# Patient Record
Sex: MALE | Race: WHITE | URBAN - METROPOLITAN AREA
[De-identification: names, ages, dates, MRNs, and addresses within clinical notes are randomized per-mention and may not be internally consistent; named-entity substitution may affect disease eponyms.]

---

## 2023-01-16 SDOH — HEALTH STABILITY: PHYSICAL HEALTH: ON AVERAGE, HOW MANY MINUTES DO YOU ENGAGE IN EXERCISE AT THIS LEVEL?: 0 MIN

## 2023-01-17 ENCOUNTER — OFFICE VISIT (OUTPATIENT)
Dept: FAMILY MEDICINE CLINIC | Age: 68
End: 2023-01-17
Payer: COMMERCIAL

## 2023-01-17 VITALS
HEART RATE: 89 BPM | DIASTOLIC BLOOD PRESSURE: 70 MMHG | RESPIRATION RATE: 16 BRPM | OXYGEN SATURATION: 97 % | WEIGHT: 288 LBS | HEIGHT: 69 IN | BODY MASS INDEX: 42.65 KG/M2 | SYSTOLIC BLOOD PRESSURE: 122 MMHG | TEMPERATURE: 98.4 F

## 2023-01-17 DIAGNOSIS — E66.01 OBESITY, CLASS III, BMI 40-49.9 (MORBID OBESITY) (HCC): ICD-10-CM

## 2023-01-17 DIAGNOSIS — I10 PRIMARY HYPERTENSION: ICD-10-CM

## 2023-01-17 DIAGNOSIS — H93.8X2 EAR FULLNESS, LEFT: ICD-10-CM

## 2023-01-17 DIAGNOSIS — E78.5 HYPERLIPIDEMIA, UNSPECIFIED HYPERLIPIDEMIA TYPE: ICD-10-CM

## 2023-01-17 DIAGNOSIS — K21.9 GASTROESOPHAGEAL REFLUX DISEASE, UNSPECIFIED WHETHER ESOPHAGITIS PRESENT: Primary | ICD-10-CM

## 2023-01-17 DIAGNOSIS — J32.9 CHRONIC SINUSITIS, UNSPECIFIED LOCATION: ICD-10-CM

## 2023-01-17 PROBLEM — E66.813 OBESITY, CLASS III, BMI 40-49.9 (MORBID OBESITY): Status: ACTIVE | Noted: 2023-01-17

## 2023-01-17 PROCEDURE — 3078F DIAST BP <80 MM HG: CPT | Performed by: FAMILY MEDICINE

## 2023-01-17 PROCEDURE — 1123F ACP DISCUSS/DSCN MKR DOCD: CPT | Performed by: FAMILY MEDICINE

## 2023-01-17 PROCEDURE — 99204 OFFICE O/P NEW MOD 45 MIN: CPT | Performed by: FAMILY MEDICINE

## 2023-01-17 PROCEDURE — 3074F SYST BP LT 130 MM HG: CPT | Performed by: FAMILY MEDICINE

## 2023-01-17 RX ORDER — TRIAMTERENE AND HYDROCHLOROTHIAZIDE 37.5; 25 MG/1; MG/1
1 TABLET ORAL DAILY
COMMUNITY

## 2023-01-17 RX ORDER — FAMOTIDINE 40 MG/1
40 TABLET, FILM COATED ORAL 2 TIMES DAILY
Qty: 60 TABLET | Refills: 5 | Status: SHIPPED | OUTPATIENT
Start: 2023-01-17

## 2023-01-17 RX ORDER — AMOXICILLIN AND CLAVULANATE POTASSIUM 875; 125 MG/1; MG/1
1 TABLET, FILM COATED ORAL 2 TIMES DAILY
Qty: 20 TABLET | Refills: 0 | Status: SHIPPED | OUTPATIENT
Start: 2023-01-17 | End: 2023-01-27

## 2023-01-17 RX ORDER — FAMOTIDINE 40 MG/1
40 TABLET, FILM COATED ORAL DAILY
COMMUNITY
End: 2023-01-17 | Stop reason: SDUPTHER

## 2023-01-17 RX ORDER — FAMOTIDINE 40 MG/1
40 TABLET, FILM COATED ORAL 2 TIMES DAILY
Qty: 60 TABLET | Refills: 2 | Status: SHIPPED | OUTPATIENT
Start: 2023-01-17 | End: 2023-01-17 | Stop reason: SDUPTHER

## 2023-01-17 RX ORDER — ATORVASTATIN CALCIUM 20 MG/1
20 TABLET, FILM COATED ORAL DAILY
Qty: 90 TABLET | Refills: 1 | Status: SHIPPED | OUTPATIENT
Start: 2023-01-17

## 2023-01-17 RX ORDER — LOSARTAN POTASSIUM 50 MG/1
50 TABLET ORAL DAILY
COMMUNITY
End: 2023-01-17 | Stop reason: SDUPTHER

## 2023-01-17 RX ORDER — LOSARTAN POTASSIUM 50 MG/1
50 TABLET ORAL DAILY
Qty: 90 TABLET | Refills: 1 | Status: SHIPPED | OUTPATIENT
Start: 2023-01-17

## 2023-01-17 RX ORDER — AMLODIPINE BESYLATE 10 MG/1
10 TABLET ORAL DAILY
Qty: 90 TABLET | Refills: 1 | Status: SHIPPED | OUTPATIENT
Start: 2023-01-17

## 2023-01-17 RX ORDER — AMLODIPINE BESYLATE 10 MG/1
10 TABLET ORAL DAILY
COMMUNITY
End: 2023-01-17 | Stop reason: SDUPTHER

## 2023-01-17 RX ORDER — SIMVASTATIN 40 MG
40 TABLET ORAL NIGHTLY
COMMUNITY
End: 2023-01-17 | Stop reason: ALTCHOICE

## 2023-01-17 ASSESSMENT — ENCOUNTER SYMPTOMS
SINUS PRESSURE: 1
SINUS COMPLAINT: 1

## 2023-01-17 ASSESSMENT — PATIENT HEALTH QUESTIONNAIRE - PHQ9
SUM OF ALL RESPONSES TO PHQ QUESTIONS 1-9: 0
SUM OF ALL RESPONSES TO PHQ9 QUESTIONS 1 & 2: 0
1. LITTLE INTEREST OR PLEASURE IN DOING THINGS: 0
SUM OF ALL RESPONSES TO PHQ QUESTIONS 1-9: 0
SUM OF ALL RESPONSES TO PHQ QUESTIONS 1-9: 0
2. FEELING DOWN, DEPRESSED OR HOPELESS: 0
SUM OF ALL RESPONSES TO PHQ QUESTIONS 1-9: 0

## 2023-01-17 NOTE — PROGRESS NOTES
Patient ID: Melyl [place] [birth year]    . Chief Complaint   Patient presents with    New Patient    Hypertension    Hyperlipidemia    Knee Pain     Right.1/10. Pain increases when sitting with legs out, legs gets stiff then pain increases. Had Knee replacement 16 months ago. Ear Fullness     Left ear fullness, has had tubes in left ear. Hypertension  This is a chronic problem. The problem is controlled. Risk factors for coronary artery disease include obesity and male gender. Past treatments include angiotensin blockers and calcium channel blockers. Compliance problems include diet. Hyperlipidemia  This is a chronic problem. Current antihyperlipidemic treatment includes statins. Risk factors for coronary artery disease include male sex, obesity and hypertension. Foot Swelling   The pain is present in the left lower leg and right lower leg. This is a recurrent problem. The current episode started more than 1 month ago. There has been no history of extremity trauma. Treatments tried: diuretic every great once in a while. Knee Pain   Incident onset: Had knee replacement here in [facility]. Plans on going to [place] to get his knee reevaluated. The pain is present in the right knee. The pain is at a severity of 1/10. Associated symptoms comments: swelling. Sinus Problem  This is a recurrent problem. Episode onset: 1 month. Associated symptoms include sinus pressure. Ear Fullness   There is pain in the left ear. This is a new problem. The current episode started 1 to 4 weeks ago. The problem occurs constantly. The problem has been unchanged. Gastroesophageal Reflux  This is a chronic problem. The current episode started more than 1 year ago. The problem occurs occasionally. The problem has been waxing and waning. The symptoms are aggravated by certain foods. He has tried a histamine-2 antagonist for the symptoms. The treatment provided mild relief.        Patient Active Problem List Diagnosis    Gastroesophageal reflux disease    Obesity, Class III, BMI 40-49.9 (morbid obesity) ([facility] [address]    Hyperlipidemia    Primary hypertension         Past Surgical History:   Procedure Laterality Date    SINUS SURGERY      TOTAL KNEE ARTHROPLASTY Right     TYMPANOSTOMY TUBE PLACEMENT Left     UMBILICAL HERNIA REPAIR      VASECTOMY         Family History   Problem Relation Age of Onset    Breast Cancer Mother          62    Heart Failure Father        Current Outpatient Medications on File Prior to Visit   Medication Sig Dispense Refill    triamterene-hydroCHLOROthiazide (MAXZIDE-25) 37.5-25 MG per tablet Take 1 tablet by mouth daily       No current facility-administered medications on file prior to visit. Objective:         Physical Exam  Constitutional:       General: He is not in acute distress. Appearance: He is well-developed. He is morbidly obese. HENT:      Head: Normocephalic and atraumatic. Comments: Large tongue small airway. Right Ear: Hearing, tympanic membrane and external ear normal.      Left Ear: Hearing, tympanic membrane and external ear normal.      Nose: No mucosal edema or rhinorrhea. Right Sinus: No maxillary sinus tenderness or frontal sinus tenderness. Left Sinus: No maxillary sinus tenderness or frontal sinus tenderness. Mouth/Throat:      Pharynx: No oropharyngeal exudate or posterior oropharyngeal erythema. Tonsils: No tonsillar abscesses. Eyes:      General: Lids are normal.      Conjunctiva/sclera: Conjunctivae normal.   Neck:      Thyroid: No thyromegaly. Trachea: No tracheal deviation. Cardiovascular:      Rate and Rhythm: Normal rate and regular rhythm. Heart sounds: Normal heart sounds, S1 normal and S2 normal. No murmur heard. No gallop. Pulmonary:      Effort: Pulmonary effort is normal. No respiratory distress. Breath sounds: Normal breath sounds. No wheezing or rales.    Abdominal: Palpations: Abdomen is soft. There is no mass. Tenderness: There is no abdominal tenderness. Musculoskeletal:      Right lower leg: No edema. Left lower leg: No edema. Lymphadenopathy:      Head:      Right side of head: No submental, submandibular or posterior auricular adenopathy. Left side of head: No submental, submandibular or posterior auricular adenopathy. Cervical:      Right cervical: No superficial, deep or posterior cervical adenopathy. Left cervical: No superficial, deep or posterior cervical adenopathy. Skin:     General: Skin is warm and dry. Neurological:      Mental Status: He is oriented to person, place, and time. Psychiatric:         Speech: Speech normal.         Behavior: Behavior normal.         Thought Content: Thought content normal.     Vitals:    01/17/23 1047   BP: 122/70   Site: Left Upper Arm   Position: Sitting   Cuff Size: Large Adult   Pulse: 89   Resp: 16   Temp: 98.4 °F (36.9 °C)   TempSrc: Infrared   SpO2: 97%   Weight: 288 lb (130.6 kg)   Height: 5' 9.29\" (1.76 m)     Body mass index is 42.17 kg/m². Wt Readings from Last 3 Encounters:   01/17/23 288 lb (130.6 kg)   05/10/17 260 lb (117.9 kg)   05/31/16 260 lb (117.9 kg)     BP Readings from Last 3 Encounters:   01/17/23 122/70   05/10/17 138/86   05/31/16 148/83          No results found for this visit on 01/17/23. The ASCVD Risk score (Jamal DK, et al., 2019) failed to calculate for the following reasons:    Cannot find a previous HDL lab    Cannot find a previous total cholesterol lab  Lab Review not applicable        Assessment:       Diagnosis Orders   1. Gastroesophageal reflux disease, unspecified whether esophagitis present  famotidine (PEPCID) 40 MG tablet    DISCONTINUED: famotidine (PEPCID) 40 MG tablet      2. Obesity, Class III, BMI 40-49.9 (morbid obesity) ([facility] [address]        3. Hyperlipidemia, unspecified hyperlipidemia type  atorvastatin (LIPITOR) 20 MG tablet      4.  Primary hypertension  losartan (COZAAR) 50 MG tablet    amLODIPine (NORVASC) 10 MG tablet      5. Chronic sinusitis, unspecified location  amoxicillin-clavulanate (AUGMENTIN) 875-125 MG per tablet      6. Ear fullness, left                Plan:      Since GERD is not stable, will increase the Pepcid from once a day to twice a day    Encourage patient to lose weight    Regarding his hyperlipidemia, change the Zocor to Lipitor. Patient on amlodipine which is a contraindication to the mix of Zocor and amlodipine    Hypertension stable. Continue losartan and amlodipine. Weight loss strongly recommended    Reassurance that left ear is okay to    Antibiotics for the sinus infection. Recheck in 6 months    Get records. Return in about 6 months (around 7/6/2023) for HTN, Hyperlipid, Gerd.

## 2023-01-17 NOTE — PATIENT INSTRUCTIONS
Learning About Eating More Fruits and Vegetables  What are some quick tips for eating more fruits and vegetables? We're all encouraged to eat more fruits and vegetables. Yet it can seem like one more chore on the daily to-do list. But you can add color and crunch to your meals--and lots of nutrition--with these quick tips. Brighten up your breakfast.  Add sliced fruit or frozen berries to your yogurt, pancakes, or cereal.  Blend fresh or frozen fruit, veggies, and yogurt with a little fruit juice, and you've got a tasty smoothie. Make your scrambled eggs a gourmet treat by adding onions, celery, and bell peppers. Bake up some bran muffins with grated carrots added into the mix. Make a livelier lunch. Jazz up tuna or chicken salad with apple chunks, celery, or grapes--or all of them! Add cucumbers, avocado slices, tomatoes, and lettuce to your sandwiches. Kick up the flavor of grilled cheese sandwiches with spinach and tomatoes. Puree some potatoes or squash to add to tomato soup. Add delicious veggies to dinner. Give more color and taste to salads. Stir in red cabbage, carrots, and bell peppers. Top salads with dried cranberries or raisins. \"Frost\" your salad with orange sections or strawberries. Keep a bag or two of frozen vegetables ready to pull out of the freezer for a side dish. Spice up spaghetti and meatballs with mushrooms and bell peppers. Roast vegetables like cauliflower or squash in the oven with olive oil to bring out their flavor. Season your veggie dish with herbs like basil and wally and a splash of lemon juice and olive oil. If you've got a main dish in the oven, stick in a potato to round out your meal.  Grab some healthy snacks on the go. Scoop up an apple, banana, or plum for a quick snack. Cut up raw fruits and veggies to keep in your fridge. Grapes, oranges, carrots, and celery are great choices. They'll be ready for a quick snack or an after-school treat.   Dip raw vegetables in hummus or peanut butter. Keep dried fruit on hand for an easy \"take with you\" snack. Make something sweet--and healthy. Try baked apples or pears topped with cinnamon and honey for a delicious dessert. Make chocolate chip cookies even better with grated carrots added to the mix. Where can you learn more? Go to https://[employer]. org and sign in to your [employer] account. Enter F050 in the [place] box to learn more about \"Learning About Eating More Fruits and Vegetables. \"     If you do not have an account, please click on the \"Sign Up Now\" link. Current as of: November 7, 2018  Content Version: 12.0  © 3675-7991 Healthwise, Incorporated. Care instructions adapted under license by [facility] [facility] If you have questions about a medical condition or this instruction, always ask your healthcare professional. [place] [address] any warranty or liability for your use of this information.

## 2023-01-18 ENCOUNTER — TELEPHONE (OUTPATIENT)
Dept: FAMILY MEDICINE CLINIC | Age: 68
End: 2023-01-18

## 2023-01-18 NOTE — TELEPHONE ENCOUNTER
Called patient to schedule AWV with LPN. LM for him to return my call. Need to also verify Medicare B effective date.

## 2023-07-03 SDOH — ECONOMIC STABILITY: INCOME INSECURITY: HOW HARD IS IT FOR YOU TO PAY FOR THE VERY BASICS LIKE FOOD, HOUSING, MEDICAL CARE, AND HEATING?: NOT HARD AT ALL

## 2023-07-03 SDOH — ECONOMIC STABILITY: TRANSPORTATION INSECURITY
IN THE PAST 12 MONTHS, HAS LACK OF TRANSPORTATION KEPT YOU FROM MEETINGS, WORK, OR FROM GETTING THINGS NEEDED FOR DAILY LIVING?: NO

## 2023-07-03 SDOH — ECONOMIC STABILITY: FOOD INSECURITY: WITHIN THE PAST 12 MONTHS, YOU WORRIED THAT YOUR FOOD WOULD RUN OUT BEFORE YOU GOT MONEY TO BUY MORE.: NEVER TRUE

## 2023-07-03 SDOH — ECONOMIC STABILITY: FOOD INSECURITY: WITHIN THE PAST 12 MONTHS, THE FOOD YOU BOUGHT JUST DIDN'T LAST AND YOU DIDN'T HAVE MONEY TO GET MORE.: NEVER TRUE

## 2023-07-03 SDOH — ECONOMIC STABILITY: HOUSING INSECURITY
IN THE LAST 12 MONTHS, WAS THERE A TIME WHEN YOU DID NOT HAVE A STEADY PLACE TO SLEEP OR SLEPT IN A SHELTER (INCLUDING NOW)?: NO

## 2023-07-06 ENCOUNTER — OFFICE VISIT (OUTPATIENT)
Dept: FAMILY MEDICINE CLINIC | Age: 68
End: 2023-07-06
Payer: COMMERCIAL

## 2023-07-06 VITALS
HEART RATE: 102 BPM | WEIGHT: 292 LBS | SYSTOLIC BLOOD PRESSURE: 138 MMHG | RESPIRATION RATE: 16 BRPM | DIASTOLIC BLOOD PRESSURE: 70 MMHG | OXYGEN SATURATION: 96 % | BODY MASS INDEX: 42.76 KG/M2

## 2023-07-06 DIAGNOSIS — Z86.010 HISTORY OF COLON POLYPS: ICD-10-CM

## 2023-07-06 DIAGNOSIS — F51.04 PSYCHOPHYSIOLOGIC INSOMNIA: ICD-10-CM

## 2023-07-06 DIAGNOSIS — I49.3 FREQUENT PVCS: ICD-10-CM

## 2023-07-06 DIAGNOSIS — Z12.11 SCREEN FOR COLON CANCER: ICD-10-CM

## 2023-07-06 DIAGNOSIS — I10 PRIMARY HYPERTENSION: Primary | ICD-10-CM

## 2023-07-06 DIAGNOSIS — E66.01 OBESITY, CLASS III, BMI 40-49.9 (MORBID OBESITY) (HCC): ICD-10-CM

## 2023-07-06 DIAGNOSIS — K21.9 GASTROESOPHAGEAL REFLUX DISEASE, UNSPECIFIED WHETHER ESOPHAGITIS PRESENT: ICD-10-CM

## 2023-07-06 DIAGNOSIS — E78.5 HYPERLIPIDEMIA, UNSPECIFIED HYPERLIPIDEMIA TYPE: ICD-10-CM

## 2023-07-06 LAB
ALBUMIN SERPL-MCNC: 4.4 G/DL (ref 3.4–5)
ALBUMIN/GLOB SERPL: 1.8 {RATIO} (ref 1.1–2.2)
ALP SERPL-CCNC: 104 U/L (ref 40–129)
ALT SERPL-CCNC: 18 U/L (ref 10–40)
ANION GAP SERPL CALCULATED.3IONS-SCNC: 14 MMOL/L (ref 3–16)
AST SERPL-CCNC: 19 U/L (ref 15–37)
BASOPHILS # BLD: 0 K/UL (ref 0–0.2)
BASOPHILS NFR BLD: 0.5 %
BILIRUB SERPL-MCNC: <0.2 MG/DL (ref 0–1)
BUN SERPL-MCNC: 19 MG/DL (ref 7–20)
CALCIUM SERPL-MCNC: 9.8 MG/DL (ref 8.3–10.6)
CHLORIDE SERPL-SCNC: 105 MMOL/L (ref 99–110)
CHOLEST SERPL-MCNC: 181 MG/DL (ref 0–199)
CO2 SERPL-SCNC: 24 MMOL/L (ref 21–32)
CREAT SERPL-MCNC: 1.2 MG/DL (ref 0.8–1.3)
CREAT UR-MCNC: 208 MG/DL (ref 39–259)
DEPRECATED RDW RBC AUTO: 14.3 % (ref 12.4–15.4)
EOSINOPHIL # BLD: 0.2 K/UL (ref 0–0.6)
EOSINOPHIL NFR BLD: 3.2 %
GFR SERPLBLD CREATININE-BSD FMLA CKD-EPI: >60 ML/MIN/{1.73_M2}
GLUCOSE SERPL-MCNC: 122 MG/DL (ref 70–99)
HCT VFR BLD AUTO: 44.8 % (ref 40.5–52.5)
HDLC SERPL-MCNC: 39 MG/DL (ref 40–60)
HGB BLD-MCNC: 15.2 G/DL (ref 13.5–17.5)
LDLC SERPL CALC-MCNC: 92 MG/DL
LYMPHOCYTES # BLD: 1.7 K/UL (ref 1–5.1)
LYMPHOCYTES NFR BLD: 25.6 %
MCH RBC QN AUTO: 27.9 PG (ref 26–34)
MCHC RBC AUTO-ENTMCNC: 33.9 G/DL (ref 31–36)
MCV RBC AUTO: 82.4 FL (ref 80–100)
MICROALBUMIN UR DL<=1MG/L-MCNC: <1.2 MG/DL
MICROALBUMIN/CREAT UR: NORMAL MG/G (ref 0–30)
MONOCYTES # BLD: 0.5 K/UL (ref 0–1.3)
MONOCYTES NFR BLD: 7.5 %
NEUTROPHILS # BLD: 4.2 K/UL (ref 1.7–7.7)
NEUTROPHILS NFR BLD: 63.2 %
PLATELET # BLD AUTO: 281 K/UL (ref 135–450)
PMV BLD AUTO: 9.2 FL (ref 5–10.5)
POTASSIUM SERPL-SCNC: 3.9 MMOL/L (ref 3.5–5.1)
PROT SERPL-MCNC: 6.8 G/DL (ref 6.4–8.2)
RBC # BLD AUTO: 5.44 M/UL (ref 4.2–5.9)
SODIUM SERPL-SCNC: 143 MMOL/L (ref 136–145)
TRIGL SERPL-MCNC: 250 MG/DL (ref 0–150)
TSH SERPL DL<=0.005 MIU/L-ACNC: 2.33 UIU/ML (ref 0.27–4.2)
VLDLC SERPL CALC-MCNC: 50 MG/DL
WBC # BLD AUTO: 6.6 K/UL (ref 4–11)

## 2023-07-06 PROCEDURE — 36415 COLL VENOUS BLD VENIPUNCTURE: CPT | Performed by: FAMILY MEDICINE

## 2023-07-06 PROCEDURE — 93000 ELECTROCARDIOGRAM COMPLETE: CPT | Performed by: FAMILY MEDICINE

## 2023-07-06 PROCEDURE — 99214 OFFICE O/P EST MOD 30 MIN: CPT | Performed by: FAMILY MEDICINE

## 2023-07-06 PROCEDURE — 1123F ACP DISCUSS/DSCN MKR DOCD: CPT | Performed by: FAMILY MEDICINE

## 2023-07-06 PROCEDURE — 3078F DIAST BP <80 MM HG: CPT | Performed by: FAMILY MEDICINE

## 2023-07-06 PROCEDURE — 3075F SYST BP GE 130 - 139MM HG: CPT | Performed by: FAMILY MEDICINE

## 2023-07-06 RX ORDER — LOSARTAN POTASSIUM 50 MG/1
50 TABLET ORAL DAILY
Qty: 90 TABLET | Refills: 1 | Status: SHIPPED
Start: 2023-07-06 | End: 2023-07-06 | Stop reason: ALTCHOICE

## 2023-07-06 RX ORDER — AMLODIPINE BESYLATE 10 MG/1
10 TABLET ORAL DAILY
Qty: 90 TABLET | Refills: 1 | Status: SHIPPED | OUTPATIENT
Start: 2023-07-06

## 2023-07-06 RX ORDER — ATORVASTATIN CALCIUM 20 MG/1
20 TABLET, FILM COATED ORAL DAILY
Qty: 90 TABLET | Refills: 1 | Status: SHIPPED | OUTPATIENT
Start: 2023-07-06

## 2023-07-06 RX ORDER — FAMOTIDINE 40 MG/1
40 TABLET, FILM COATED ORAL 2 TIMES DAILY
Qty: 60 TABLET | Refills: 5 | Status: SHIPPED | OUTPATIENT
Start: 2023-07-06

## 2023-07-06 RX ORDER — METOPROLOL SUCCINATE 50 MG/1
50 TABLET, EXTENDED RELEASE ORAL DAILY
Qty: 90 TABLET | Refills: 0 | Status: SHIPPED | OUTPATIENT
Start: 2023-07-06

## 2023-07-06 NOTE — PROGRESS NOTES
Patient ID: Phyllis Peterson     . Chief Complaint   Patient presents with    Hypertension    Hyperlipidemia    Gastroesophageal Reflux       Hypertension  This is a chronic problem. The problem is controlled. Risk factors for coronary artery disease include obesity and male gender. Past treatments include angiotensin blockers and calcium channel blockers. Compliance problems include diet. Hyperlipidemia  This is a chronic problem. Current antihyperlipidemic treatment includes statins. Risk factors for coronary artery disease include male sex, obesity and hypertension. Gastroesophageal Reflux  This is a chronic problem. The current episode started more than 1 year ago. The problem occurs occasionally. The problem has been waxing and waning. The symptoms are aggravated by certain foods. He has tried a histamine-2 antagonist for the symptoms. The treatment provided mild relief. Insomnia: Sometimes takes Tylenol PM to go to sleep. Patient Active Problem List   Diagnosis    Gastroesophageal reflux disease    Obesity, Class III, BMI 40-49.9 (morbid obesity) ([address])    Hyperlipidemia    Primary hypertension       Past Surgical History:   Procedure Laterality Date    SINUS SURGERY      TOTAL KNEE ARTHROPLASTY Right     TYMPANOSTOMY TUBE PLACEMENT Left     UMBILICAL HERNIA REPAIR      VASECTOMY         Family History   Problem Relation Age of Onset    Breast Cancer Mother          62    Heart Failure Father        No current outpatient medications on file prior to visit. No current facility-administered medications on file prior to visit. Objective:         Physical Exam  Vitals and nursing note reviewed. Constitutional:       Appearance: He is well-developed. He is morbidly obese. HENT:      Head: Normocephalic and atraumatic. Cardiovascular:      Rate and Rhythm: Normal rate. Rhythm irregularly irregular.       Heart sounds: Normal heart sounds, S1 normal and S2 normal. No

## 2023-07-07 ENCOUNTER — TELEPHONE (OUTPATIENT)
Dept: CARDIOLOGY CLINIC | Age: 68
End: 2023-07-07

## 2023-07-07 NOTE — TELEPHONE ENCOUNTER
Attempting to reach patient in order to schedule a consult with Denzel Hopper for hypertension and frequent PVC's per referral from Ladora Ganser; subscriber not accepting calls.

## 2023-07-12 ENCOUNTER — TELEMEDICINE (OUTPATIENT)
Dept: FAMILY MEDICINE CLINIC | Age: 68
End: 2023-07-12
Payer: COMMERCIAL

## 2023-07-12 DIAGNOSIS — E78.5 HYPERLIPIDEMIA, UNSPECIFIED HYPERLIPIDEMIA TYPE: Primary | ICD-10-CM

## 2023-07-12 DIAGNOSIS — Z00.00 INITIAL MEDICARE ANNUAL WELLNESS VISIT: Primary | ICD-10-CM

## 2023-07-12 PROCEDURE — 1123F ACP DISCUSS/DSCN MKR DOCD: CPT | Performed by: FAMILY MEDICINE

## 2023-07-12 PROCEDURE — G0438 PPPS, INITIAL VISIT: HCPCS | Performed by: FAMILY MEDICINE

## 2023-07-12 RX ORDER — ATORVASTATIN CALCIUM 40 MG/1
40 TABLET, FILM COATED ORAL DAILY
Qty: 90 TABLET | Refills: 1 | Status: SHIPPED | OUTPATIENT
Start: 2023-07-12

## 2023-07-12 ASSESSMENT — PATIENT HEALTH QUESTIONNAIRE - PHQ9
SUM OF ALL RESPONSES TO PHQ QUESTIONS 1-9: 0
1. LITTLE INTEREST OR PLEASURE IN DOING THINGS: 0
SUM OF ALL RESPONSES TO PHQ9 QUESTIONS 1 & 2: 0
SUM OF ALL RESPONSES TO PHQ QUESTIONS 1-9: 0
2. FEELING DOWN, DEPRESSED OR HOPELESS: 0

## 2023-07-12 ASSESSMENT — LIFESTYLE VARIABLES
HOW OFTEN DO YOU HAVE A DRINK CONTAINING ALCOHOL: MONTHLY OR LESS
HOW MANY STANDARD DRINKS CONTAINING ALCOHOL DO YOU HAVE ON A TYPICAL DAY: 1 OR 2

## 2023-07-12 NOTE — PATIENT INSTRUCTIONS
Personalized Preventive Plan for Salma Carbajal - 7/12/2023  Medicare offers a range of preventive health benefits. Some of the tests and screenings are paid in full while other may be subject to a deductible, co-insurance, and/or copay. Some of these benefits include a comprehensive review of your medical history including lifestyle, illnesses that may run in your family, and various assessments and screenings as appropriate. After reviewing your medical record and screening and assessments performed today your provider may have ordered immunizations, labs, imaging, and/or referrals for you. A list of these orders (if applicable) as well as your Preventive Care list are included within your After Visit Summary for your review. Other Preventive Recommendations:    A preventive eye exam performed by an eye specialist is recommended every 1-2 years to screen for glaucoma; cataracts, macular degeneration, and other eye disorders. A preventive dental visit is recommended every 6 months. Try to get at least 150 minutes of exercise per week or 10,000 steps per day on a pedometer . Order or download the FREE \"Exercise & Physical Activity: Your Everyday Guide\" from [employer]. Call 3-239.388.1087 or search [employer] online. You need 2765-1910 mg of calcium and 5873-2696 IU of vitamin D per day. It is possible to meet your calcium requirement with diet alone, but a vitamin D supplement is usually necessary to meet this goal.  When exposed to the sun, use a sunscreen that protects against both UVA and UVB radiation with an SPF of 30 or greater. Reapply every 2 to 3 hours or after sweating, drying off with a towel, or swimming. Always wear a seat belt when traveling in a car. Always wear a helmet when riding a bicycle or motorcycle.

## 2023-07-12 NOTE — PROGRESS NOTES
Medicare Annual Wellness Visit    Aníbal Nesbitt is here for Medicare AWV    Assessment & Plan   Initial Medicare annual wellness visit  Recommendations for Preventive Services Due: see orders and patient instructions/AVS.  Recommended screening schedule for the next 5-10 years is provided to the patient in written form: see Patient Instructions/AVS.     No follow-ups on file. Subjective       Patient's complete Health Risk Assessment and screening values have been reviewed and are found in Flowsheets. The following problems were reviewed today and where indicated follow up appointments were made and/or referrals ordered. Positive Risk Factor Screenings with Interventions:                 Weight and Activity:  Physical Activity: Inactive    Days of Exercise per Week: 0 days    Minutes of Exercise per Session: 0 min     On average, how many days per week do you engage in moderate to strenuous exercise (like a brisk walk)?: 0 days  Have you lost any weight without trying in the past 3 months?: No  There is no height or weight on file to calculate BMI. (!) Abnormal    Inactivity Interventions:  Patient comments: patient states he does want to go back to [place], but, states he was advised to wait until he sees Cardiology next month to be sure he is okay. Obesity Interventions:  Patient comments: patient really does want to lose weight and we discussed his eating habits and reducing his carbs, drinking plenty of water. Patient states he has quit drinking Pepsi. 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 Declines referral at this time.   Patient declines any further evaluation or treatment       Advanced

## 2023-08-15 ENCOUNTER — NURSE ONLY (OUTPATIENT)
Dept: CARDIOLOGY CLINIC | Age: 68
End: 2023-08-15

## 2023-08-15 ENCOUNTER — INITIAL CONSULT (OUTPATIENT)
Dept: CARDIOLOGY CLINIC | Age: 68
End: 2023-08-15
Payer: COMMERCIAL

## 2023-08-15 VITALS
SYSTOLIC BLOOD PRESSURE: 138 MMHG | WEIGHT: 292.8 LBS | BODY MASS INDEX: 40.99 KG/M2 | HEART RATE: 70 BPM | HEIGHT: 71 IN | DIASTOLIC BLOOD PRESSURE: 88 MMHG

## 2023-08-15 DIAGNOSIS — I10 PRIMARY HYPERTENSION: Primary | ICD-10-CM

## 2023-08-15 DIAGNOSIS — E78.5 HYPERLIPIDEMIA, UNSPECIFIED HYPERLIPIDEMIA TYPE: ICD-10-CM

## 2023-08-15 DIAGNOSIS — E66.01 OBESITY, CLASS III, BMI 40-49.9 (MORBID OBESITY) (HCC): ICD-10-CM

## 2023-08-15 DIAGNOSIS — R06.02 SOB (SHORTNESS OF BREATH) ON EXERTION: ICD-10-CM

## 2023-08-15 DIAGNOSIS — Z82.49 FAMILY HISTORY OF EARLY CAD: ICD-10-CM

## 2023-08-15 DIAGNOSIS — R00.2 PALPITATIONS: ICD-10-CM

## 2023-08-15 PROCEDURE — 93000 ELECTROCARDIOGRAM COMPLETE: CPT | Performed by: INTERNAL MEDICINE

## 2023-08-15 PROCEDURE — 3075F SYST BP GE 130 - 139MM HG: CPT | Performed by: INTERNAL MEDICINE

## 2023-08-15 PROCEDURE — 99204 OFFICE O/P NEW MOD 45 MIN: CPT | Performed by: INTERNAL MEDICINE

## 2023-08-15 PROCEDURE — 3079F DIAST BP 80-89 MM HG: CPT | Performed by: INTERNAL MEDICINE

## 2023-08-15 NOTE — PATIENT INSTRUCTIONS
We are committed to providing you the best care possible. If you receive a survey after visiting one of our offices, please take time to share your experience concerning your physician office visit. These surveys are confidential and no health information about you is shared. We are eager to improve for you and we are counting on your feedback to help make that happen. **It is YOUR responsibilty to bring medication bottles and/or updated medication list to [address]. This will allow us to better serve you and all your healthcare needs**    Thank you for allowing us to care for you today! We want to ensure we can follow your treatment plan and we strive to give you the best outcomes and experience possible. If you ever have a life threatening emergency and call 911 - for an ambulance (EMS)   Our providers can only care for you at:   [facility] or [facility]. Even if you have someone take you or you drive yourself we can only care for you in a [facility]. Our providers are not setup at the other healthcare locations!

## 2023-08-15 NOTE — ASSESSMENT & PLAN NOTE
Somewhat improved on Toprol-XL 50 mg daily, get treadmill to see if he can unmask an anemia get 48-hour Holter monitor to see PVC burden

## 2023-08-15 NOTE — PROGRESS NOTES
48 hour holter monitor applied8/15/2023 @1:24 pm for Delio Hernandez Serial # [id number] . Instructed patient on monitor and proper use. Instructed on diary. When to remove and bring it back. Must leave the holter monitor on  without removing for the duration of time ordered. Answered all questions the patient had. Instructed patient to call [facility] at [phone] with any questions or concerns with the monitor.

## 2023-08-15 NOTE — ASSESSMENT & PLAN NOTE
Get echo to evaluate for LVH he has multiple comorbidities. For now continue metoprolol 50 XL due to palpitations and amlodipine 10 mg daily which may be contributing to his leg edema?

## 2023-08-15 NOTE — PROGRESS NOTES
CARDIOLOGY  NOTE    Chief Complaint: SOB/PVC    HPI:   AYLA is a 70y.o. year old who has Past medical history as noted below. Comes in for evaluation he was sent in by his primary doc due to concerns for irregular heartbeat he does have multiple risk factors he reports shortness of breath and leg swelling but more so on the right leg ever since he has knee surgery. He denies any chest pain as such but has exertional shortness of breath for quite some he has been hypertensive used to have a lot of palpitation which is somewhat better after titrating up of his metoprolol there is family history of coronary artery disease and heart failure father  had  heart problems. Current Outpatient Medications   Medication Sig Dispense Refill    atorvastatin (LIPITOR) 40 MG tablet Take 1 tablet by mouth daily Replacing the 20 mg 90 tablet 1    famotidine (PEPCID) 40 MG tablet Take 1 tablet by mouth 2 times daily 60 tablet 5    amLODIPine (NORVASC) 10 MG tablet Take 1 tablet by mouth daily 90 tablet 1    metoprolol succinate (TOPROL XL) 50 MG extended release tablet Take 1 tablet by mouth daily 90 tablet 0     No current facility-administered medications for this visit. Allergies:   Patient has no known allergies.     Patient History:  Past Medical History:   Diagnosis Date    Hypertension      Past Surgical History:   Procedure Laterality Date    SINUS SURGERY      TOTAL KNEE ARTHROPLASTY Right     TYMPANOSTOMY TUBE PLACEMENT Left     UMBILICAL HERNIA REPAIR      VASECTOMY       Family History   Problem Relation Age of Onset    Breast Cancer Mother          62    Heart Failure Father     No Known Problems Sister     No Known Problems Sister      Social History     Tobacco Use    Smoking status: Never    Smokeless tobacco: Never   Substance Use Topics    Alcohol use: No        Review of Systems:   Constitutional: No Fever or Weight Loss   Eyes: No Decreased Vision  ENT: No

## 2023-08-15 NOTE — ASSESSMENT & PLAN NOTE
We will try and get a treadmill stress test but he was unable to walk we will switch to Lexiscan due to shortness of breath with exertion rule out ischemia as etiology is multiple risk factors

## 2023-08-21 ENCOUNTER — PROCEDURE VISIT (OUTPATIENT)
Dept: CARDIOLOGY CLINIC | Age: 68
End: 2023-08-21
Payer: COMMERCIAL

## 2023-08-21 DIAGNOSIS — E78.5 HYPERLIPIDEMIA, UNSPECIFIED HYPERLIPIDEMIA TYPE: ICD-10-CM

## 2023-08-21 DIAGNOSIS — R06.02 SOB (SHORTNESS OF BREATH) ON EXERTION: ICD-10-CM

## 2023-08-21 DIAGNOSIS — Z82.49 FAMILY HISTORY OF EARLY CAD: ICD-10-CM

## 2023-08-21 DIAGNOSIS — I10 PRIMARY HYPERTENSION: Primary | ICD-10-CM

## 2023-08-21 DIAGNOSIS — I10 PRIMARY HYPERTENSION: ICD-10-CM

## 2023-08-21 LAB
LV EF: 58 %
LVEF MODALITY: NORMAL

## 2023-08-21 PROCEDURE — 93015 CV STRESS TEST SUPVJ I&R: CPT | Performed by: INTERNAL MEDICINE

## 2023-08-21 PROCEDURE — 93306 TTE W/DOPPLER COMPLETE: CPT | Performed by: INTERNAL MEDICINE

## 2023-08-28 ENCOUNTER — TELEPHONE (OUTPATIENT)
Dept: CARDIOLOGY CLINIC | Age: 68
End: 2023-08-28

## 2023-08-28 NOTE — TELEPHONE ENCOUNTER
Summary   Limited study due to patients body habitus. Left ventricular function and size is normal, EF is estimated at 55-60%. Mild left ventricular hypertrophy. Grade I diastolic dysfunction. Mildly dilated left atrium. Mild tricuspid regurgitation; RVSP is 28 mmHg. No evidence of pericardial effusion   Fat pad present. Spoke to pt regarding results. Patient advised and voices understanding.

## 2023-09-11 ENCOUNTER — OFFICE VISIT (OUTPATIENT)
Dept: CARDIOLOGY CLINIC | Age: 68
End: 2023-09-11
Payer: COMMERCIAL

## 2023-09-11 VITALS
BODY MASS INDEX: 41.16 KG/M2 | OXYGEN SATURATION: 96 % | SYSTOLIC BLOOD PRESSURE: 132 MMHG | HEART RATE: 73 BPM | DIASTOLIC BLOOD PRESSURE: 88 MMHG | HEIGHT: 71 IN | WEIGHT: 294 LBS

## 2023-09-11 DIAGNOSIS — I49.3 PVC (PREMATURE VENTRICULAR CONTRACTION): ICD-10-CM

## 2023-09-11 DIAGNOSIS — E66.01 OBESITY, CLASS III, BMI 40-49.9 (MORBID OBESITY) (HCC): Primary | ICD-10-CM

## 2023-09-11 DIAGNOSIS — Z82.49 FAMILY HISTORY OF EARLY CAD: ICD-10-CM

## 2023-09-11 DIAGNOSIS — I49.3 FREQUENT PVCS: ICD-10-CM

## 2023-09-11 DIAGNOSIS — I47.1 SVT (SUPRAVENTRICULAR TACHYCARDIA) (HCC): ICD-10-CM

## 2023-09-11 DIAGNOSIS — I10 PRIMARY HYPERTENSION: ICD-10-CM

## 2023-09-11 DIAGNOSIS — R06.02 SOB (SHORTNESS OF BREATH) ON EXERTION: ICD-10-CM

## 2023-09-11 DIAGNOSIS — E78.5 HYPERLIPIDEMIA, UNSPECIFIED HYPERLIPIDEMIA TYPE: ICD-10-CM

## 2023-09-11 DIAGNOSIS — R00.2 PALPITATIONS: ICD-10-CM

## 2023-09-11 PROBLEM — I47.10 SVT (SUPRAVENTRICULAR TACHYCARDIA): Status: ACTIVE | Noted: 2023-09-11

## 2023-09-11 PROCEDURE — 1123F ACP DISCUSS/DSCN MKR DOCD: CPT | Performed by: INTERNAL MEDICINE

## 2023-09-11 PROCEDURE — 3075F SYST BP GE 130 - 139MM HG: CPT | Performed by: INTERNAL MEDICINE

## 2023-09-11 PROCEDURE — 3079F DIAST BP 80-89 MM HG: CPT | Performed by: INTERNAL MEDICINE

## 2023-09-11 PROCEDURE — 99214 OFFICE O/P EST MOD 30 MIN: CPT | Performed by: INTERNAL MEDICINE

## 2023-09-11 RX ORDER — METOPROLOL SUCCINATE 100 MG/1
100 TABLET, EXTENDED RELEASE ORAL DAILY
Qty: 90 TABLET | Refills: 3 | Status: SHIPPED | OUTPATIENT
Start: 2023-09-11

## 2023-09-11 RX ORDER — MAGNESIUM OXIDE 400 MG/1
400 TABLET ORAL DAILY
Qty: 30 TABLET | Refills: 1 | Status: SHIPPED | OUTPATIENT
Start: 2023-09-11

## 2023-09-11 NOTE — PROGRESS NOTES
signs of ankle edema, or volume overload, No evidence of JVD, No crackles   GI:  Bowel sounds normal, Soft, No tenderness, No masses, No gross visceromegaly   :  No costovertebral angle tenderness   Musculoskeletal:  No edema, no tenderness, no deformities. Back- no tenderness  Integument:  Well hydrated, no rash   Lymphatic:  No lymphadenopathy noted   Neurologic:  Alert & oriented x 3, CN 2-12 normal, normal motor function, normal sensory function, no focal deficits noted   Psychiatric:  Speech and behavior appropriate       Medical decision making and Data review:  DATA:  No results found for: \"TROPONINT\"  BNP:  No results found for: \"PROBNP\"  PT/INR:  No results found for: \"PTINR\"  No results found for: \"LABA1C\"  Lab Results   Component Value Date    CHOL 181 07/06/2023    TRIG 250 (H) 07/06/2023    HDL 39 (L) 07/06/2023    LDLCALC 92 07/06/2023    LDLDIRECT 115 (H) 08/09/2014     Lab Results   Component Value Date    ALT 18 07/06/2023    AST 19 07/06/2023     No results for input(s): \"WBC\", \"HGB\", \"HCT\", \"MCV\", \"PLT\" in the last 72 hours. TSH: No results found for: \"TSH\"  Lab Results   Component Value Date    AST 19 07/06/2023    ALT 18 07/06/2023    BILIDIR 0.1 05/17/2014    BILITOT <0.2 07/06/2023    ALKPHOS 104 07/06/2023     No results found for: \"PROBNP\"  No results found for: \"LABA1C\"  Lab Results   Component Value Date    WBC 6.6 07/06/2023    HGB 15.2 07/06/2023    HCT 44.8 07/06/2023     07/06/2023     Echo 8/21/2023   Summary   Limited study due to patients body habitus. Left ventricular function and size is normal, EF is estimated at 55-60%. Mild left ventricular hypertrophy. Grade I diastolic dysfunction. Mildly dilated left atrium. Mild tricuspid regurgitation; RVSP is 28 mmHg. No evidence of pericardial effusion   Fat pad present. Stress test   Summary   Overall Impression:   Reduced exercise performance without angina and ischemic EKG changes.    has pvcs   Functional

## 2023-09-11 NOTE — PATIENT INSTRUCTIONS
Please be informed that if you contact our office outside of normal business hours the physician on call cannot help with any scheduling or rescheduling issues, procedure instruction questions or any type of medication issue. We advise you for any urgent/emergency that you go to the nearest emergency room! PLEASE CALL OUR OFFICE DURING NORMAL BUSINESS HOURS    Monday - Friday   8 am to 5 pm    [place]: [phone]    [place]: [phone]    [place]:  [phone] - No appointment necessary. Sites open Monday to Friday. Call your preferred location for test preparation, business   hours and other information you need. [employer] accepts [employer]. [address] [facility] [facility]. [place], [address] [facility]  Phone: [phone]     **It is YOUR responsibilty to bring medication bottles and/or updated medication list to [address] [place] [address]. This will allow us to better serve you and all your healthcare needs**    Thank you for allowing us to care for you today! We want to ensure we can follow your treatment plan and we strive to give you the best outcomes and experience possible. If you ever have a life threatening emergency and call 911 - for an ambulance (EMS)   Our providers can only care for you at:   [facility] or [facility]. Even if you have someone take you or you drive yourself we can only care for you in a [facility] facility. Our providers are not setup at the other healthcare locations!

## 2023-09-18 ENCOUNTER — PROCEDURE VISIT (OUTPATIENT)
Dept: CARDIOLOGY CLINIC | Age: 68
End: 2023-09-18

## 2023-09-18 DIAGNOSIS — I47.1 SVT (SUPRAVENTRICULAR TACHYCARDIA) (HCC): ICD-10-CM

## 2023-09-18 DIAGNOSIS — E78.5 HYPERLIPIDEMIA, UNSPECIFIED HYPERLIPIDEMIA TYPE: ICD-10-CM

## 2023-09-18 DIAGNOSIS — R06.02 SOB (SHORTNESS OF BREATH) ON EXERTION: ICD-10-CM

## 2023-09-18 DIAGNOSIS — Z82.49 FAMILY HISTORY OF EARLY CAD: ICD-10-CM

## 2023-09-18 DIAGNOSIS — I49.3 PVC (PREMATURE VENTRICULAR CONTRACTION): ICD-10-CM

## 2023-09-18 DIAGNOSIS — R00.2 PALPITATIONS: ICD-10-CM

## 2023-09-18 DIAGNOSIS — I49.3 FREQUENT PVCS: ICD-10-CM

## 2023-09-18 DIAGNOSIS — I10 PRIMARY HYPERTENSION: ICD-10-CM

## 2023-09-18 DIAGNOSIS — E66.01 OBESITY, CLASS III, BMI 40-49.9 (MORBID OBESITY) (HCC): ICD-10-CM

## 2023-09-19 ENCOUNTER — TELEPHONE (OUTPATIENT)
Dept: CARDIOLOGY CLINIC | Age: 68
End: 2023-09-19

## 2023-09-29 ENCOUNTER — TELEPHONE (OUTPATIENT)
Dept: CARDIOLOGY CLINIC | Age: 68
End: 2023-09-29

## 2023-09-29 NOTE — TELEPHONE ENCOUNTER
----- Message from Carolina Diggs MA sent at 9/19/2023  8:18 AM EDT -----    ----- Message -----  From: Alfonso Hernandez MD  Sent: 9/18/2023   6:45 PM EDT  To: Carolina Diggs MA    Follow up in office

## 2023-10-02 ENCOUNTER — TELEPHONE (OUTPATIENT)
Dept: CARDIOLOGY CLINIC | Age: 68
End: 2023-10-02

## 2023-10-02 DIAGNOSIS — I10 PRIMARY HYPERTENSION: ICD-10-CM

## 2023-10-02 DIAGNOSIS — I49.3 FREQUENT PVCS: ICD-10-CM

## 2023-10-02 RX ORDER — METOPROLOL SUCCINATE 50 MG/1
50 TABLET, EXTENDED RELEASE ORAL DAILY
Qty: 90 TABLET | Refills: 0 | OUTPATIENT
Start: 2023-10-02

## 2023-10-02 NOTE — TELEPHONE ENCOUNTER
Follow up in office. Left ms for pt to return call    Follow up appt on 10/17  245pm    Spoke to pt regarding results and follow up appt. Patient advised and voices understanding.

## 2023-10-06 ENCOUNTER — OFFICE VISIT (OUTPATIENT)
Dept: FAMILY MEDICINE CLINIC | Age: 68
End: 2023-10-06
Payer: COMMERCIAL

## 2023-10-06 VITALS
WEIGHT: 294.6 LBS | SYSTOLIC BLOOD PRESSURE: 138 MMHG | OXYGEN SATURATION: 95 % | BODY MASS INDEX: 41.24 KG/M2 | HEIGHT: 71 IN | HEART RATE: 82 BPM | DIASTOLIC BLOOD PRESSURE: 70 MMHG

## 2023-10-06 DIAGNOSIS — H61.22 IMPACTED CERUMEN, LEFT EAR: ICD-10-CM

## 2023-10-06 DIAGNOSIS — E78.5 HYPERLIPIDEMIA, UNSPECIFIED HYPERLIPIDEMIA TYPE: ICD-10-CM

## 2023-10-06 DIAGNOSIS — K21.9 GASTROESOPHAGEAL REFLUX DISEASE, UNSPECIFIED WHETHER ESOPHAGITIS PRESENT: ICD-10-CM

## 2023-10-06 DIAGNOSIS — R60.0 BILATERAL LEG EDEMA: ICD-10-CM

## 2023-10-06 DIAGNOSIS — I10 PRIMARY HYPERTENSION: Primary | ICD-10-CM

## 2023-10-06 DIAGNOSIS — Z23 NEED FOR INFLUENZA VACCINATION: ICD-10-CM

## 2023-10-06 DIAGNOSIS — Z12.11 SCREEN FOR COLON CANCER: ICD-10-CM

## 2023-10-06 PROCEDURE — 90694 VACC AIIV4 NO PRSRV 0.5ML IM: CPT | Performed by: FAMILY MEDICINE

## 2023-10-06 PROCEDURE — 1123F ACP DISCUSS/DSCN MKR DOCD: CPT | Performed by: FAMILY MEDICINE

## 2023-10-06 PROCEDURE — 3078F DIAST BP <80 MM HG: CPT | Performed by: FAMILY MEDICINE

## 2023-10-06 PROCEDURE — 3075F SYST BP GE 130 - 139MM HG: CPT | Performed by: FAMILY MEDICINE

## 2023-10-06 PROCEDURE — 99214 OFFICE O/P EST MOD 30 MIN: CPT | Performed by: FAMILY MEDICINE

## 2023-10-06 PROCEDURE — G0008 ADMIN INFLUENZA VIRUS VAC: HCPCS | Performed by: FAMILY MEDICINE

## 2023-10-06 RX ORDER — FAMOTIDINE 40 MG/1
40 TABLET, FILM COATED ORAL 2 TIMES DAILY
Qty: 180 TABLET | Refills: 1 | Status: SHIPPED | OUTPATIENT
Start: 2023-10-06

## 2023-10-06 RX ORDER — ATORVASTATIN CALCIUM 40 MG/1
40 TABLET, FILM COATED ORAL DAILY
Qty: 90 TABLET | Refills: 1 | Status: SHIPPED | OUTPATIENT
Start: 2023-10-06

## 2023-10-06 RX ORDER — TRIAMTERENE AND HYDROCHLOROTHIAZIDE 37.5; 25 MG/1; MG/1
1 TABLET ORAL DAILY
Qty: 90 TABLET | Refills: 1 | Status: SHIPPED | OUTPATIENT
Start: 2023-10-06

## 2023-10-06 RX ORDER — AMLODIPINE BESYLATE 10 MG/1
10 TABLET ORAL DAILY
Qty: 90 TABLET | Refills: 1 | Status: SHIPPED | OUTPATIENT
Start: 2023-10-06

## 2023-10-17 ENCOUNTER — OFFICE VISIT (OUTPATIENT)
Dept: CARDIOLOGY CLINIC | Age: 68
End: 2023-10-17
Payer: COMMERCIAL

## 2023-10-17 VITALS
HEIGHT: 71 IN | DIASTOLIC BLOOD PRESSURE: 72 MMHG | OXYGEN SATURATION: 96 % | SYSTOLIC BLOOD PRESSURE: 138 MMHG | HEART RATE: 72 BPM | WEIGHT: 297 LBS | BODY MASS INDEX: 41.58 KG/M2

## 2023-10-17 DIAGNOSIS — I49.3 FREQUENT PVCS: ICD-10-CM

## 2023-10-17 DIAGNOSIS — E78.5 HYPERLIPIDEMIA, UNSPECIFIED HYPERLIPIDEMIA TYPE: ICD-10-CM

## 2023-10-17 DIAGNOSIS — I10 PRIMARY HYPERTENSION: ICD-10-CM

## 2023-10-17 DIAGNOSIS — R94.39 ABNORMAL STRESS TEST: Primary | ICD-10-CM

## 2023-10-17 DIAGNOSIS — Z82.49 FAMILY HISTORY OF EARLY CAD: ICD-10-CM

## 2023-10-17 DIAGNOSIS — E66.01 OBESITY, CLASS III, BMI 40-49.9 (MORBID OBESITY) (HCC): ICD-10-CM

## 2023-10-17 PROCEDURE — 3078F DIAST BP <80 MM HG: CPT | Performed by: NURSE PRACTITIONER

## 2023-10-17 PROCEDURE — 99214 OFFICE O/P EST MOD 30 MIN: CPT | Performed by: NURSE PRACTITIONER

## 2023-10-17 PROCEDURE — 3074F SYST BP LT 130 MM HG: CPT | Performed by: NURSE PRACTITIONER

## 2023-10-17 PROCEDURE — 1123F ACP DISCUSS/DSCN MKR DOCD: CPT | Performed by: NURSE PRACTITIONER

## 2023-10-17 RX ORDER — METOPROLOL SUCCINATE 100 MG/1
100 TABLET, EXTENDED RELEASE ORAL 2 TIMES DAILY
Qty: 90 TABLET | Refills: 3
Start: 2023-10-17

## 2023-10-17 RX ORDER — ASPIRIN 81 MG/1
TABLET ORAL
COMMUNITY
Start: 2015-07-30

## 2023-10-17 RX ORDER — LOSARTAN POTASSIUM 50 MG/1
TABLET ORAL
COMMUNITY
Start: 2015-07-30

## 2023-10-17 NOTE — PATIENT INSTRUCTIONS
**It is YOUR responsibilty to bring medication bottles and/or updated medication list to [facility]. This will allow us to better serve you and all your healthcare needs**   [facility] Locations - No appointment necessary. Sites open Monday to Friday. Call your preferred location for test preparation, business   hours and other information you need. [employer] accepts 's. [address] [place] [address]. [address], [address] [facility]  Phone: [phone]    Thank you for allowing us to care for you today! We want to ensure we can follow your treatment plan and we strive to give you the best outcomes and experience possible. If you ever have a life threatening emergency and call 911 - for an ambulance (EMS)   Our providers can only care for you at:   [facility] or [facility]. Even if you have someone take you or you drive yourself we can only care for you in a [facility]. Our providers are not setup at the other healthcare locations! Please be informed that if you contact our office outside of normal business hours the physician on call cannot help with any scheduling or rescheduling issues, procedure instruction questions or any type of medication issue. We advise you for any urgent/emergency that you go to the nearest emergency room! PLEASE CALL OUR OFFICE DURING NORMAL BUSINESS HOURS    Monday - Friday   8 am to 5 pm    [place]: [address] [place]: [phone]    [place]:  [phone]  We are committed to providing you the best care possible. If you receive a survey after visiting one of our offices, please take time to share your experience concerning your physician office visit. These surveys are confidential and no health information about you is shared. We are eager to improve for you and we are counting on your feedback to help make that happen.

## 2023-10-17 NOTE — PROGRESS NOTES
CARDIOLOGY  NOTE    10/17/2023    Jeanette Dancer (:  1955) is a 70 y.o. [facility] established patient with Dr. Jessica Aguirre, here for evaluation of the following chief complaint(s):  Results (Pt denies any cardiac sx )        SUBJECTIVE/OBJECTIVE:    ROSARIO Spear has Past medical history as noted below. Comes in for  irregular heartbeat he does have multiple risk factors he reports shortness of breath and leg swelling but more so on the right leg ever since he has knee surgery. On Holter he had runs of wide-complex tachycardia although he was not symptomatic on treadmill he could only complete 3 minutes of exercise in order to have multiple PVCs   He denies any chest pain as such but has exertional shortness of breath for quite some he has been hypertensive used to have a lot of palpitation which is somewhat better after titrating up of his metoprolol there is family history of coronary artery disease and heart failure father  had  heart problems. Review of Systems   Constitutional:  Negative for fatigue and fever. Respiratory:  Negative for cough and shortness of breath. Cardiovascular:  Negative for chest pain, palpitations and leg swelling. Musculoskeletal:  Negative for arthralgias and gait problem. Neurological:  Negative for dizziness, syncope, weakness, light-headedness and headaches. Vitals:    10/17/23 1436 10/17/23 1441   BP: (!) 144/72 138/72   Site: Left Upper Arm Left Upper Arm   Position: Sitting Sitting   Cuff Size: Large Adult Large Adult   Pulse: 72    SpO2: 96%    Weight: 297 lb (134.7 kg)    Height: 5' 11\" (1.803 m)        Wt Readings from Last 3 Encounters:   10/17/23 297 lb (134.7 kg)   10/06/23 294 lb 9.6 oz (133.6 kg)   23 294 lb (133.4 kg)       BP Readings from Last 3 Encounters:   10/17/23 138/72   10/06/23 138/70   23 132/88       Prior to Admission medications    Medication Sig Start Date End Date Taking?  Authorizing Provider   aspirin 81 MG EC tablet

## 2023-10-23 ASSESSMENT — ENCOUNTER SYMPTOMS
COUGH: 0
SHORTNESS OF BREATH: 0

## 2023-11-20 ENCOUNTER — OFFICE VISIT (OUTPATIENT)
Dept: CARDIOLOGY CLINIC | Age: 68
End: 2023-11-20
Payer: COMMERCIAL

## 2023-11-20 VITALS
HEART RATE: 66 BPM | WEIGHT: 298.2 LBS | RESPIRATION RATE: 18 BRPM | BODY MASS INDEX: 41.75 KG/M2 | OXYGEN SATURATION: 93 % | SYSTOLIC BLOOD PRESSURE: 138 MMHG | DIASTOLIC BLOOD PRESSURE: 80 MMHG | HEIGHT: 71 IN

## 2023-11-20 DIAGNOSIS — I49.3 FREQUENT PVCS: ICD-10-CM

## 2023-11-20 DIAGNOSIS — R94.39 ABNORMAL STRESS TEST: Primary | ICD-10-CM

## 2023-11-20 DIAGNOSIS — I10 PRIMARY HYPERTENSION: ICD-10-CM

## 2023-11-20 DIAGNOSIS — E78.2 MIXED HYPERLIPIDEMIA: ICD-10-CM

## 2023-11-20 DIAGNOSIS — E66.01 OBESITY, CLASS III, BMI 40-49.9 (MORBID OBESITY) (HCC): ICD-10-CM

## 2023-11-20 PROCEDURE — 1123F ACP DISCUSS/DSCN MKR DOCD: CPT | Performed by: NURSE PRACTITIONER

## 2023-11-20 PROCEDURE — 99214 OFFICE O/P EST MOD 30 MIN: CPT | Performed by: NURSE PRACTITIONER

## 2023-11-20 PROCEDURE — 3075F SYST BP GE 130 - 139MM HG: CPT | Performed by: NURSE PRACTITIONER

## 2023-11-20 PROCEDURE — 3079F DIAST BP 80-89 MM HG: CPT | Performed by: NURSE PRACTITIONER

## 2023-11-20 RX ORDER — MAGNESIUM OXIDE 400 MG/1
400 TABLET ORAL DAILY
Qty: 90 TABLET | Refills: 1 | Status: SHIPPED | OUTPATIENT
Start: 2023-11-20

## 2023-11-20 RX ORDER — METOPROLOL SUCCINATE 200 MG/1
200 TABLET, EXTENDED RELEASE ORAL NIGHTLY
Qty: 90 TABLET | Refills: 1 | Status: SHIPPED | OUTPATIENT
Start: 2023-11-20

## 2023-11-20 ASSESSMENT — ENCOUNTER SYMPTOMS
COUGH: 0
SHORTNESS OF BREATH: 0

## 2023-11-20 NOTE — PROGRESS NOTES
concerns. Greater than 60 % of time spent counseling besides reviewing data and images       Return in about 3 months (around 2/20/2024). An electronic signature was used to authenticate this note.     Electronically signed by IVETTE Perez CNP on 11/20/2023 at 3:10 PM

## 2024-02-05 LAB
CHOLESTEROL, TOTAL: 166 MG/DL
CHOLESTEROL/HDL RATIO: NORMAL
HDLC SERPL-MCNC: 38 MG/DL (ref 35–70)
LDL CHOLESTEROL CALCULATED: 87 MG/DL (ref 0–160)
NONHDLC SERPL-MCNC: NORMAL MG/DL
TRIGL SERPL-MCNC: 203 MG/DL
VLDLC SERPL CALC-MCNC: 41 MG/DL

## 2024-02-20 ENCOUNTER — OFFICE VISIT (OUTPATIENT)
Dept: CARDIOLOGY CLINIC | Age: 69
End: 2024-02-20
Payer: COMMERCIAL

## 2024-02-20 ENCOUNTER — TELEPHONE (OUTPATIENT)
Dept: CARDIOLOGY CLINIC | Age: 69
End: 2024-02-20

## 2024-02-20 VITALS
HEART RATE: 77 BPM | SYSTOLIC BLOOD PRESSURE: 136 MMHG | BODY MASS INDEX: 42.42 KG/M2 | WEIGHT: 303 LBS | HEIGHT: 71 IN | OXYGEN SATURATION: 97 % | DIASTOLIC BLOOD PRESSURE: 68 MMHG

## 2024-02-20 DIAGNOSIS — R60.0 BILATERAL LEG EDEMA: ICD-10-CM

## 2024-02-20 DIAGNOSIS — R94.39 ABNORMAL STRESS TEST: Primary | ICD-10-CM

## 2024-02-20 DIAGNOSIS — E66.01 OBESITY, CLASS III, BMI 40-49.9 (MORBID OBESITY) (HCC): ICD-10-CM

## 2024-02-20 PROCEDURE — 3075F SYST BP GE 130 - 139MM HG: CPT | Performed by: NURSE PRACTITIONER

## 2024-02-20 PROCEDURE — 99214 OFFICE O/P EST MOD 30 MIN: CPT | Performed by: NURSE PRACTITIONER

## 2024-02-20 PROCEDURE — 3078F DIAST BP <80 MM HG: CPT | Performed by: NURSE PRACTITIONER

## 2024-02-20 PROCEDURE — 1123F ACP DISCUSS/DSCN MKR DOCD: CPT | Performed by: NURSE PRACTITIONER

## 2024-02-20 RX ORDER — TRIAMTERENE AND HYDROCHLOROTHIAZIDE 37.5; 25 MG/1; MG/1
1 TABLET ORAL DAILY PRN
Qty: 90 TABLET | Refills: 1
Start: 2024-02-20

## 2024-02-20 ASSESSMENT — ENCOUNTER SYMPTOMS
COUGH: 0
SHORTNESS OF BREATH: 0

## 2024-02-20 NOTE — PATIENT INSTRUCTIONS
Please be informed that if you contact our office outside of normal business hours the physician on call cannot help with any scheduling or rescheduling issues, procedure instruction questions or any type of medication issue.    We advise you for any urgent/emergency that you go to the nearest emergency room!    PLEASE CALL OUR OFFICE DURING NORMAL BUSINESS HOURS    Monday - Friday   8 am to 5 pm    [place]: [phone]    [place]: [phone]    [place]:  [phone]    **It is YOUR responsibilty to bring medication bottles and/or updated medication list to EACH APPOINTMENT. This will allow us to better serve you and all your healthcare needs**    Thank you for allowing us to care for you today!   We want to ensure we can follow your treatment plan and we strive to give you the best outcomes and experience possible.   If you ever have a life threatening emergency and call 911 - for an ambulance (EMS)   Our providers can only care for you at:   [facility] or [facility].   Even if you have someone take you or you drive yourself we can only care for you in a [facility] facility. Our providers are not setup at the other healthcare locations!

## 2024-04-01 DIAGNOSIS — R60.0 BILATERAL LEG EDEMA: ICD-10-CM

## 2024-04-01 RX ORDER — TRIAMTERENE AND HYDROCHLOROTHIAZIDE 37.5; 25 MG/1; MG/1
1 TABLET ORAL DAILY
Qty: 90 TABLET | Refills: 1 | OUTPATIENT
Start: 2024-04-01

## 2024-04-01 ASSESSMENT — PATIENT HEALTH QUESTIONNAIRE - PHQ9
2. FEELING DOWN, DEPRESSED OR HOPELESS: NOT AT ALL
SUM OF ALL RESPONSES TO PHQ QUESTIONS 1-9: 0
2. FEELING DOWN, DEPRESSED OR HOPELESS: NOT AT ALL
1. LITTLE INTEREST OR PLEASURE IN DOING THINGS: NOT AT ALL
SUM OF ALL RESPONSES TO PHQ QUESTIONS 1-9: 0
1. LITTLE INTEREST OR PLEASURE IN DOING THINGS: NOT AT ALL
SUM OF ALL RESPONSES TO PHQ9 QUESTIONS 1 & 2: 0
SUM OF ALL RESPONSES TO PHQ9 QUESTIONS 1 & 2: 0

## 2024-04-04 ENCOUNTER — OFFICE VISIT (OUTPATIENT)
Dept: FAMILY MEDICINE CLINIC | Age: 69
End: 2024-04-04
Payer: COMMERCIAL

## 2024-04-04 VITALS
OXYGEN SATURATION: 97 % | WEIGHT: 303.8 LBS | SYSTOLIC BLOOD PRESSURE: 132 MMHG | DIASTOLIC BLOOD PRESSURE: 70 MMHG | BODY MASS INDEX: 42.39 KG/M2 | HEART RATE: 62 BPM

## 2024-04-04 DIAGNOSIS — E78.5 HYPERLIPIDEMIA, UNSPECIFIED HYPERLIPIDEMIA TYPE: ICD-10-CM

## 2024-04-04 DIAGNOSIS — E66.01 OBESITY, CLASS III, BMI 40-49.9 (MORBID OBESITY) (HCC): ICD-10-CM

## 2024-04-04 DIAGNOSIS — R60.0 BILATERAL LEG EDEMA: ICD-10-CM

## 2024-04-04 DIAGNOSIS — R73.09 ABNORMAL GLUCOSE: ICD-10-CM

## 2024-04-04 DIAGNOSIS — Z23 NEED FOR PNEUMOCOCCAL VACCINE: ICD-10-CM

## 2024-04-04 DIAGNOSIS — K21.9 GASTROESOPHAGEAL REFLUX DISEASE, UNSPECIFIED WHETHER ESOPHAGITIS PRESENT: ICD-10-CM

## 2024-04-04 DIAGNOSIS — E78.2 MIXED HYPERLIPIDEMIA: ICD-10-CM

## 2024-04-04 DIAGNOSIS — R73.03 PREDIABETES: ICD-10-CM

## 2024-04-04 DIAGNOSIS — I10 PRIMARY HYPERTENSION: Primary | ICD-10-CM

## 2024-04-04 LAB — HBA1C MFR BLD: 6 %

## 2024-04-04 PROCEDURE — 90677 PCV20 VACCINE IM: CPT | Performed by: FAMILY MEDICINE

## 2024-04-04 PROCEDURE — 83036 HEMOGLOBIN GLYCOSYLATED A1C: CPT | Performed by: FAMILY MEDICINE

## 2024-04-04 PROCEDURE — 1123F ACP DISCUSS/DSCN MKR DOCD: CPT | Performed by: FAMILY MEDICINE

## 2024-04-04 PROCEDURE — 3078F DIAST BP <80 MM HG: CPT | Performed by: FAMILY MEDICINE

## 2024-04-04 PROCEDURE — 99214 OFFICE O/P EST MOD 30 MIN: CPT | Performed by: FAMILY MEDICINE

## 2024-04-04 PROCEDURE — 3075F SYST BP GE 130 - 139MM HG: CPT | Performed by: FAMILY MEDICINE

## 2024-04-04 PROCEDURE — G0009 ADMIN PNEUMOCOCCAL VACCINE: HCPCS | Performed by: FAMILY MEDICINE

## 2024-04-04 RX ORDER — FAMOTIDINE 40 MG/1
40 TABLET, FILM COATED ORAL 2 TIMES DAILY
Qty: 180 TABLET | Refills: 1 | Status: SHIPPED | OUTPATIENT
Start: 2024-04-04

## 2024-04-04 RX ORDER — AMLODIPINE BESYLATE 10 MG/1
10 TABLET ORAL DAILY
Qty: 90 TABLET | Refills: 1 | Status: SHIPPED | OUTPATIENT
Start: 2024-04-04

## 2024-04-04 RX ORDER — ATORVASTATIN CALCIUM 40 MG/1
40 TABLET, FILM COATED ORAL DAILY
Qty: 90 TABLET | Refills: 1 | Status: SHIPPED | OUTPATIENT
Start: 2024-04-04

## 2024-04-04 NOTE — PROGRESS NOTES
Relation Age of Onset    Breast Cancer Mother          58    Heart Failure Father     No Known Problems Sister     No Known Problems Sister     Kidney Disease Daughter         dialysis       Current Outpatient Medications on File Prior to Visit   Medication Sig Dispense Refill    metoprolol succinate (TOPROL XL) 200 MG extended release tablet Take 1 tablet by mouth at bedtime 90 tablet 1    magnesium oxide (MAG-OX) 400 MG tablet Take 1 tablet by mouth daily 90 tablet 1    aspirin 81 MG EC tablet Oral      triamterene-hydroCHLOROthiazide (MAXZIDE-25) 37.5-25 MG per tablet Take 1 tablet by mouth daily as needed (swelling) (Patient not taking: Reported on 2024) 90 tablet 1     No current facility-administered medications on file prior to visit.                   Objective:         Physical Exam  Vitals and nursing note reviewed.   Constitutional:       Appearance: He is well-developed. He is morbidly obese.   HENT:      Head: Normocephalic and atraumatic.   Cardiovascular:      Rate and Rhythm: Normal rate and regular rhythm.      Heart sounds: Normal heart sounds, S1 normal and S2 normal.   Pulmonary:      Effort: No respiratory distress.      Breath sounds: Normal breath sounds. No wheezing or rales.   Musculoskeletal:      Right lower leg: Edema present.      Left lower leg: Edema present.      Comments: 1+ edema bilateral lower extremiteis   Skin:     General: Skin is warm and dry.   Neurological:      Mental Status: He is alert.   Psychiatric:         Speech: Speech normal.         Behavior: Behavior normal.       Vitals:    24 0948 24 1007   BP: (!) 151/75 132/70   Site: Left Upper Arm    Position: Sitting    Cuff Size: Large Adult    Pulse: 62    SpO2: 97%    Weight: (!) 137.8 kg (303 lb 12.8 oz)      Body mass index is 42.39 kg/m².     Wt Readings from Last 3 Encounters:   24 (!) 137.8 kg (303 lb 12.8 oz)   24 (!) 137.4 kg (303 lb)   23 135.3 kg (298 lb 3.2 oz)     BP

## 2024-04-04 NOTE — PATIENT INSTRUCTIONS
Prediabetes: Care Instructions  Your Care Instructions    Prediabetes is a warning sign that you are at risk for getting type 2 diabetes. It means that your blood sugar is higher than it should be. The food you eat turns into sugar, which your body uses for energy. Normally, an organ called the pancreas makes insulin, which allows the sugar in your blood to get into your body's cells. But when your body can't use insulin the right way, the sugar doesn't move into cells. It stays in your blood instead. This is called insulin resistance. The buildup of sugar in the blood causes prediabetes.  The good news is that lifestyle changes may help you get your blood sugar back to normal and help you avoid or delay diabetes.  Follow-up care is a key part of your treatment and safety. Be sure to make and go to all appointments, and call your doctor if you are having problems. It's also a good idea to know your test results and keep a list of the medicines you take.  How can you care for yourself at home?  Watch your weight. A healthy weight helps your body use insulin properly.  Limit the amount of calories, sweets, and unhealthy fat you eat. Ask your doctor if you should see a dietitian. A registered dietitian can help you create meal plans that fit your lifestyle.  Get at least 30 minutes of exercise on most days of the week. Exercise helps control your blood sugar. It also helps you maintain a healthy weight. Walking is a good choice. You also may want to do other activities, such as running, swimming, cycling, or playing tennis or team sports.  Do not smoke. Smoking can make prediabetes worse. If you need help quitting, talk to your doctor about stop-smoking programs and medicines. These can increase your chances of quitting for good.  If your doctor prescribed medicines, take them exactly as prescribed. Call your doctor if you think you are having a problem with your medicine. You will get more details on the specific

## 2024-04-05 PROBLEM — R60.0 BILATERAL LEG EDEMA: Status: ACTIVE | Noted: 2024-04-05

## 2024-05-16 RX ORDER — METOPROLOL SUCCINATE 200 MG/1
200 TABLET, EXTENDED RELEASE ORAL NIGHTLY
Qty: 90 TABLET | Refills: 1 | Status: SHIPPED | OUTPATIENT
Start: 2024-05-16

## 2024-05-23 ENCOUNTER — OFFICE VISIT (OUTPATIENT)
Dept: CARDIOLOGY CLINIC | Age: 69
End: 2024-05-23
Payer: COMMERCIAL

## 2024-05-23 VITALS
SYSTOLIC BLOOD PRESSURE: 124 MMHG | HEART RATE: 60 BPM | DIASTOLIC BLOOD PRESSURE: 74 MMHG | HEIGHT: 71 IN | BODY MASS INDEX: 43.12 KG/M2 | WEIGHT: 308 LBS

## 2024-05-23 DIAGNOSIS — E66.01 OBESITY, CLASS III, BMI 40-49.9 (MORBID OBESITY) (HCC): ICD-10-CM

## 2024-05-23 DIAGNOSIS — Z82.49 FAMILY HISTORY OF EARLY CAD: ICD-10-CM

## 2024-05-23 DIAGNOSIS — R94.39 ABNORMAL STRESS TEST: Primary | ICD-10-CM

## 2024-05-23 DIAGNOSIS — I49.3 PVC (PREMATURE VENTRICULAR CONTRACTION): ICD-10-CM

## 2024-05-23 DIAGNOSIS — I49.3 FREQUENT PVCS: ICD-10-CM

## 2024-05-23 DIAGNOSIS — E78.5 HYPERLIPIDEMIA, UNSPECIFIED HYPERLIPIDEMIA TYPE: ICD-10-CM

## 2024-05-23 DIAGNOSIS — I47.10 SVT (SUPRAVENTRICULAR TACHYCARDIA) (HCC): ICD-10-CM

## 2024-05-23 DIAGNOSIS — I10 PRIMARY HYPERTENSION: ICD-10-CM

## 2024-05-23 PROCEDURE — 1123F ACP DISCUSS/DSCN MKR DOCD: CPT | Performed by: NURSE PRACTITIONER

## 2024-05-23 PROCEDURE — 3074F SYST BP LT 130 MM HG: CPT | Performed by: NURSE PRACTITIONER

## 2024-05-23 PROCEDURE — 3078F DIAST BP <80 MM HG: CPT | Performed by: NURSE PRACTITIONER

## 2024-05-23 PROCEDURE — 99214 OFFICE O/P EST MOD 30 MIN: CPT | Performed by: NURSE PRACTITIONER

## 2024-05-23 RX ORDER — ATORVASTATIN CALCIUM 40 MG/1
80 TABLET, FILM COATED ORAL DAILY
Qty: 90 TABLET | Refills: 3 | Status: SHIPPED | OUTPATIENT
Start: 2024-05-23

## 2024-05-23 NOTE — PROGRESS NOTES
CARDIOLOGY  NOTE    2024    Mark Bourgeois (:  1955) is a 68 y.o. male,an established patient with Dr. Scott, here for evaluation of the following chief complaint(s):  3 Month Follow-Up (Pt denies any new cardiac sx PT states RT more so. no surgeries or procedures scheduled that he is aware of ) and Edema        SUBJECTIVE/OBJECTIVE:    ROSARIO Flores has Past medical history as noted below.      He states that he has been doing well. He states that he walked a bunch at [employer] in in [place] and did well. Denies chest pain.     On Holter he had runs of wide-complex tachycardia although he was not symptomatic on treadmill he could only complete 3 minutes of exercise in order to have multiple PVCs     He has a family history of coronary artery disease and heart failure.     Patient is a non-smoker. Patient denies issues obtaining or taking medications. Patient is active and does do organized exercise. Patient denies chest pain, shortness of breath, palpitations, dizziness, orthopnea, lower leg swelling, or syncope.     Review of Systems   Constitutional:  Negative for fatigue and fever.   Respiratory:  Negative for cough and shortness of breath.    Cardiovascular:  Negative for chest pain, palpitations and leg swelling.   Musculoskeletal:  Negative for arthralgias and gait problem.   Neurological:  Negative for dizziness, syncope, weakness, light-headedness and headaches.       Vitals:    24 1337   BP: 124/74   Site: Left Upper Arm   Position: Sitting   Cuff Size: Large Adult   Pulse: 60   Weight: (!) 139.7 kg (308 lb)   Height: 1.803 m (5' 11\")       Wt Readings from Last 3 Encounters:   24 (!) 139.7 kg (308 lb)   24 (!) 137.8 kg (303 lb 12.8 oz)   24 (!) 137.4 kg (303 lb)       BP Readings from Last 3 Encounters:   24 124/74   24 132/70   24 136/68       Prior to Admission medications    Medication Sig Start Date End Date Taking? Authorizing Provider

## 2024-08-05 ENCOUNTER — OFFICE VISIT (OUTPATIENT)
Dept: CARDIOLOGY CLINIC | Age: 69
End: 2024-08-05
Payer: COMMERCIAL

## 2024-08-05 VITALS
WEIGHT: 308 LBS | SYSTOLIC BLOOD PRESSURE: 136 MMHG | HEART RATE: 68 BPM | HEIGHT: 71 IN | DIASTOLIC BLOOD PRESSURE: 60 MMHG | BODY MASS INDEX: 43.12 KG/M2

## 2024-08-05 DIAGNOSIS — R94.39 ABNORMAL NUCLEAR STRESS TEST: ICD-10-CM

## 2024-08-05 DIAGNOSIS — Z01.810 PRE-OPERATIVE CARDIOVASCULAR EXAMINATION: Primary | ICD-10-CM

## 2024-08-05 DIAGNOSIS — I10 PRIMARY HYPERTENSION: Primary | ICD-10-CM

## 2024-08-05 DIAGNOSIS — R94.39 ABNORMAL STRESS TEST: ICD-10-CM

## 2024-08-05 DIAGNOSIS — R06.83 SNORING: ICD-10-CM

## 2024-08-05 DIAGNOSIS — R00.2 PALPITATIONS: ICD-10-CM

## 2024-08-05 DIAGNOSIS — R73.03 PREDIABETES: ICD-10-CM

## 2024-08-05 DIAGNOSIS — I49.3 PVC (PREMATURE VENTRICULAR CONTRACTION): ICD-10-CM

## 2024-08-05 DIAGNOSIS — E66.01 OBESITY, CLASS III, BMI 40-49.9 (MORBID OBESITY) (HCC): ICD-10-CM

## 2024-08-05 DIAGNOSIS — R06.02 SOB (SHORTNESS OF BREATH) ON EXERTION: ICD-10-CM

## 2024-08-05 DIAGNOSIS — I47.10 SVT (SUPRAVENTRICULAR TACHYCARDIA) (HCC): ICD-10-CM

## 2024-08-05 DIAGNOSIS — R60.0 BILATERAL LEG EDEMA: ICD-10-CM

## 2024-08-05 PROCEDURE — 99214 OFFICE O/P EST MOD 30 MIN: CPT | Performed by: INTERNAL MEDICINE

## 2024-08-05 PROCEDURE — 1123F ACP DISCUSS/DSCN MKR DOCD: CPT | Performed by: INTERNAL MEDICINE

## 2024-08-05 PROCEDURE — 3078F DIAST BP <80 MM HG: CPT | Performed by: INTERNAL MEDICINE

## 2024-08-05 PROCEDURE — 93000 ELECTROCARDIOGRAM COMPLETE: CPT | Performed by: INTERNAL MEDICINE

## 2024-08-05 PROCEDURE — 3075F SYST BP GE 130 - 139MM HG: CPT | Performed by: INTERNAL MEDICINE

## 2024-08-05 NOTE — PROGRESS NOTES
questions, or concerns.Greater than 60 % of time spent counseling besides reviewing data and images     Continue all other medications of all above medical condition listed as is.    Return in about 3 months (around 11/5/2024).    Please note this report has been partially produced using speech recognition software and may contain errors related to that system including errors in grammar, punctuation, and spelling, as well as words and phrases that may be inappropriate. If there are any questions or concerns please feel free to contact the dictating provider for clarification.

## 2024-08-06 ENCOUNTER — TELEPHONE (OUTPATIENT)
Dept: CARDIOLOGY CLINIC | Age: 69
End: 2024-08-06

## 2024-08-06 NOTE — TELEPHONE ENCOUNTER
[facility]     Dr. Menendezed Ortiz Scott     LEFT HEART CATHETERIZATION WITH POSSIBLE PERCUTANEOUS CORONARY INTERVENTION    Patient Name: Mark Bourgeois   : 1955  MRN# [id number]    Date of Procedure: 2024 Time: 1:30 PM  Arrival Time: 11:30 AM    The catheterization and angiogram are usually outpatient procedures, however if stenting is needed you may need to stay overnight. You will need to arrive at the hospital two hours before the procedure.  You will go to registration in the main lobby.  You will need to arrange for someone to drive you home.      HOSPITAL:  [facility]      X   If you have received orders for blood work and or a chest x-ray, please have         them done on assigned date at [facility],           [facility], or [facility].     X Please do not have anything by mouth after midnight prior to or 8 hours before   the procedure.    X You may take your medications with a sip of water in the morning of your               procedure or take them with you to the hospital                    X If you are taking DYAZIDE (Triamterene/Hydrochlorothiazide)   please do not take it the morning of your procedure.     X If you take Viagra (Sildenafil) or Cialis (Tadalafil) you will need to hold it for 3 days before your procedure.

## 2024-08-06 NOTE — TELEPHONE ENCOUNTER
Patient given instructions over telephone on 8/6/24.  Procedure is scheduled for 8/14/24 @ 1:30pm, w/arrival @ 11:30am, @ [facility]. Medication/Education Letter gone over with patient. Procedure and risks were explained to patient. Questions answered, Patient voiced understanding.        Patient was notified that surgery date or time could be changed due to an emergency. Patient voiced understanding.

## 2024-08-09 ENCOUNTER — HOSPITAL ENCOUNTER (OUTPATIENT)
Dept: GENERAL RADIOLOGY | Age: 69
Discharge: HOME | End: 2024-08-09
Payer: COMMERCIAL

## 2024-08-09 ENCOUNTER — HOSPITAL ENCOUNTER (OUTPATIENT)
Age: 69
Discharge: HOME | End: 2024-08-09
Payer: COMMERCIAL

## 2024-08-09 DIAGNOSIS — Z01.810 PRE-OPERATIVE CARDIOVASCULAR EXAMINATION: ICD-10-CM

## 2024-08-09 LAB
ABO/RH: NORMAL
ANION GAP SERPL CALCULATED.3IONS-SCNC: 11 MMOL/L (ref 7–16)
ANTIBODY SCREEN: NEGATIVE
BUN SERPL-MCNC: 15 MG/DL (ref 6–23)
CALCIUM SERPL-MCNC: 9.2 MG/DL (ref 8.3–10.6)
CHLORIDE BLD-SCNC: 104 MMOL/L (ref 99–110)
CO2: 26 MMOL/L (ref 21–32)
COMMENT: NORMAL
CREAT SERPL-MCNC: 1.1 MG/DL (ref 0.9–1.3)
GFR, ESTIMATED: 73 ML/MIN/1.73M2
GLUCOSE SERPL-MCNC: 115 MG/DL (ref 70–99)
HCT VFR BLD CALC: 46.8 % (ref 42–52)
HEMOGLOBIN: 15.4 GM/DL (ref 13.5–18)
MCH RBC QN AUTO: 28.6 PG (ref 27–31)
MCHC RBC AUTO-ENTMCNC: 32.9 % (ref 32–36)
MCV RBC AUTO: 86.8 FL (ref 78–100)
PDW BLD-RTO: 12.9 % (ref 11.7–14.9)
PLATELET # BLD: 289 K/CU MM (ref 140–440)
PMV BLD AUTO: 10.6 FL (ref 7.5–11.1)
POTASSIUM SERPL-SCNC: 3.9 MMOL/L (ref 3.5–5.1)
RBC # BLD: 5.39 M/CU MM (ref 4.6–6.2)
SODIUM BLD-SCNC: 141 MMOL/L (ref 135–145)
WBC # BLD: 6.4 K/CU MM (ref 4–10.5)

## 2024-08-09 PROCEDURE — 80048 BASIC METABOLIC PNL TOTAL CA: CPT

## 2024-08-09 PROCEDURE — 71046 X-RAY EXAM CHEST 2 VIEWS: CPT

## 2024-08-09 PROCEDURE — 36415 COLL VENOUS BLD VENIPUNCTURE: CPT

## 2024-08-09 PROCEDURE — 86850 RBC ANTIBODY SCREEN: CPT

## 2024-08-09 PROCEDURE — 86901 BLOOD TYPING SEROLOGIC RH(D): CPT

## 2024-08-09 PROCEDURE — 86900 BLOOD TYPING SEROLOGIC ABO: CPT

## 2024-08-09 PROCEDURE — 85027 COMPLETE CBC AUTOMATED: CPT

## 2024-08-14 ENCOUNTER — HOSPITAL ENCOUNTER (INPATIENT)
Age: 69
LOS: 9 days | Discharge: HOME | DRG: 233 | End: 2024-08-23
Attending: INTERNAL MEDICINE | Admitting: INTERNAL MEDICINE
Payer: COMMERCIAL

## 2024-08-14 DIAGNOSIS — Z95.1 S/P CABG (CORONARY ARTERY BYPASS GRAFT): ICD-10-CM

## 2024-08-14 DIAGNOSIS — R94.39 ABNORMAL NUCLEAR STRESS TEST: ICD-10-CM

## 2024-08-14 DIAGNOSIS — I25.10 ASCVD (ARTERIOSCLEROTIC CARDIOVASCULAR DISEASE): Primary | ICD-10-CM

## 2024-08-14 LAB
ACTIVATED CLOTTING TIME, LOW RANGE: 149 SEC
ACTIVATED CLOTTING TIME, LOW RANGE: 194 SEC
ACTIVATED CLOTTING TIME, LOW RANGE: 274 SEC
BASE EXCESS MIXED: 0.5 (ref 0–3)
CARBON MONOXIDE, BLOOD: 1.7 % (ref 0–5)
CHOLEST SERPL-MCNC: 117 MG/DL
CO2 CONTENT: 26.7 MMOL/L (ref 21–32)
COMMENT: ABNORMAL
ECHO BSA: 2.61 M2
ESTIMATED AVERAGE GLUCOSE: 114 MG/DL
HBA1C MFR BLD: 5.6 % (ref 4.2–6.3)
HCO3 ARTERIAL: 25.4 MMOL/L (ref 21–28)
HDLC SERPL-MCNC: 27 MG/DL
LDLC SERPL CALC-MCNC: 60 MG/DL
METHEMOGLOBIN ARTERIAL: 1 %
O2 SATURATION: 96.1 % (ref 94–98)
PCO2 ARTERIAL: 41 MMHG (ref 35–48)
PH BLOOD: 7.4 (ref 7.35–7.45)
PO2 ARTERIAL: 113 MMHG (ref 83–108)
TRIGL SERPL-MCNC: 148 MG/DL

## 2024-08-14 PROCEDURE — 2709999900 HC NON-CHARGEABLE SUPPLY: Performed by: INTERNAL MEDICINE

## 2024-08-14 PROCEDURE — 6360000002 HC RX W HCPCS: Performed by: INTERNAL MEDICINE

## 2024-08-14 PROCEDURE — B24BZZ4 ULTRASONOGRAPHY OF HEART WITH AORTA, TRANSESOPHAGEAL: ICD-10-PCS | Performed by: INTERNAL MEDICINE

## 2024-08-14 PROCEDURE — 4A023N7 MEASUREMENT OF CARDIAC SAMPLING AND PRESSURE, LEFT HEART, PERCUTANEOUS APPROACH: ICD-10-PCS | Performed by: INTERNAL MEDICINE

## 2024-08-14 PROCEDURE — 93458 L HRT ARTERY/VENTRICLE ANGIO: CPT | Performed by: INTERNAL MEDICINE

## 2024-08-14 PROCEDURE — 7100000010 HC PHASE II RECOVERY - FIRST 15 MIN: Performed by: INTERNAL MEDICINE

## 2024-08-14 PROCEDURE — 2060000000 HC ICU INTERMEDIATE R&B

## 2024-08-14 PROCEDURE — 80061 LIPID PANEL: CPT

## 2024-08-14 PROCEDURE — 1200000000 HC SEMI PRIVATE

## 2024-08-14 PROCEDURE — 83036 HEMOGLOBIN GLYCOSYLATED A1C: CPT

## 2024-08-14 PROCEDURE — B2151ZZ FLUOROSCOPY OF LEFT HEART USING LOW OSMOLAR CONTRAST: ICD-10-PCS | Performed by: INTERNAL MEDICINE

## 2024-08-14 PROCEDURE — 36415 COLL VENOUS BLD VENIPUNCTURE: CPT

## 2024-08-14 PROCEDURE — 82803 BLOOD GASES ANY COMBINATION: CPT

## 2024-08-14 PROCEDURE — C1894 INTRO/SHEATH, NON-LASER: HCPCS | Performed by: INTERNAL MEDICINE

## 2024-08-14 PROCEDURE — 75710 ARTERY X-RAYS ARM/LEG: CPT | Performed by: INTERNAL MEDICINE

## 2024-08-14 PROCEDURE — B2111ZZ FLUOROSCOPY OF MULTIPLE CORONARY ARTERIES USING LOW OSMOLAR CONTRAST: ICD-10-PCS | Performed by: INTERNAL MEDICINE

## 2024-08-14 PROCEDURE — C1769 GUIDE WIRE: HCPCS | Performed by: INTERNAL MEDICINE

## 2024-08-14 PROCEDURE — 6360000004 HC RX CONTRAST MEDICATION: Performed by: INTERNAL MEDICINE

## 2024-08-14 PROCEDURE — 6370000000 HC RX 637 (ALT 250 FOR IP): Performed by: INTERNAL MEDICINE

## 2024-08-14 PROCEDURE — C1887 CATHETER, GUIDING: HCPCS | Performed by: INTERNAL MEDICINE

## 2024-08-14 PROCEDURE — 2580000003 HC RX 258: Performed by: INTERNAL MEDICINE

## 2024-08-14 PROCEDURE — 2500000003 HC RX 250 WO HCPCS: Performed by: INTERNAL MEDICINE

## 2024-08-14 PROCEDURE — 85347 COAGULATION TIME ACTIVATED: CPT

## 2024-08-14 PROCEDURE — 93571 IV DOP VEL&/PRESS C FLO 1ST: CPT | Performed by: INTERNAL MEDICINE

## 2024-08-14 RX ORDER — ACETAMINOPHEN 325 MG/1
650 TABLET ORAL EVERY 4 HOURS PRN
Status: DISCONTINUED | OUTPATIENT
Start: 2024-08-14 | End: 2024-08-16

## 2024-08-14 RX ORDER — MIDAZOLAM HYDROCHLORIDE 1 MG/ML
INJECTION INTRAMUSCULAR; INTRAVENOUS PRN
Status: DISCONTINUED | OUTPATIENT
Start: 2024-08-14 | End: 2024-08-14 | Stop reason: HOSPADM

## 2024-08-14 RX ORDER — ACETAMINOPHEN 325 MG/1
650 TABLET ORAL EVERY 4 HOURS PRN
Status: DISCONTINUED | OUTPATIENT
Start: 2024-08-14 | End: 2024-08-14

## 2024-08-14 RX ORDER — 0.9 % SODIUM CHLORIDE 0.9 %
500 INTRAVENOUS SOLUTION INTRAVENOUS ONCE
Status: COMPLETED | OUTPATIENT
Start: 2024-08-14 | End: 2024-08-14

## 2024-08-14 RX ORDER — SODIUM CHLORIDE 0.9 % (FLUSH) 0.9 %
5-40 SYRINGE (ML) INJECTION PRN
Status: DISCONTINUED | OUTPATIENT
Start: 2024-08-14 | End: 2024-08-16 | Stop reason: SDUPTHER

## 2024-08-14 RX ORDER — SODIUM CHLORIDE 0.9 % (FLUSH) 0.9 %
5-40 SYRINGE (ML) INJECTION EVERY 12 HOURS SCHEDULED
Status: DISCONTINUED | OUTPATIENT
Start: 2024-08-14 | End: 2024-08-16 | Stop reason: SDUPTHER

## 2024-08-14 RX ORDER — FAMOTIDINE 20 MG/1
40 TABLET, FILM COATED ORAL 2 TIMES DAILY
Status: DISCONTINUED | OUTPATIENT
Start: 2024-08-14 | End: 2024-08-16

## 2024-08-14 RX ORDER — SODIUM CHLORIDE 0.9 % (FLUSH) 0.9 %
5-40 SYRINGE (ML) INJECTION PRN
Status: DISCONTINUED | OUTPATIENT
Start: 2024-08-14 | End: 2024-08-16

## 2024-08-14 RX ORDER — DIAZEPAM 5 MG
5 TABLET ORAL ONCE
Status: COMPLETED | OUTPATIENT
Start: 2024-08-14 | End: 2024-08-14

## 2024-08-14 RX ORDER — AMLODIPINE BESYLATE 10 MG/1
10 TABLET ORAL DAILY
Status: DISCONTINUED | OUTPATIENT
Start: 2024-08-14 | End: 2024-08-16

## 2024-08-14 RX ORDER — HYDRALAZINE HYDROCHLORIDE 20 MG/ML
10 INJECTION INTRAMUSCULAR; INTRAVENOUS EVERY 10 MIN PRN
Status: DISCONTINUED | OUTPATIENT
Start: 2024-08-14 | End: 2024-08-16

## 2024-08-14 RX ORDER — TRIAMTERENE/HYDROCHLOROTHIAZID 37.5-25 MG
1 TABLET ORAL DAILY PRN
Status: DISCONTINUED | OUTPATIENT
Start: 2024-08-14 | End: 2024-08-16

## 2024-08-14 RX ORDER — LANOLIN ALCOHOL/MO/W.PET/CERES
400 CREAM (GRAM) TOPICAL DAILY
Status: DISCONTINUED | OUTPATIENT
Start: 2024-08-14 | End: 2024-08-16

## 2024-08-14 RX ORDER — SODIUM CHLORIDE 9 MG/ML
INJECTION, SOLUTION INTRAVENOUS CONTINUOUS PRN
Status: COMPLETED | OUTPATIENT
Start: 2024-08-14 | End: 2024-08-14

## 2024-08-14 RX ORDER — DIPHENHYDRAMINE HCL 25 MG
25 TABLET ORAL ONCE
Status: COMPLETED | OUTPATIENT
Start: 2024-08-14 | End: 2024-08-14

## 2024-08-14 RX ORDER — SODIUM CHLORIDE 9 MG/ML
INJECTION, SOLUTION INTRAVENOUS PRN
Status: DISCONTINUED | OUTPATIENT
Start: 2024-08-14 | End: 2024-08-14

## 2024-08-14 RX ORDER — ATORVASTATIN CALCIUM 40 MG/1
80 TABLET, FILM COATED ORAL DAILY
Status: DISCONTINUED | OUTPATIENT
Start: 2024-08-14 | End: 2024-08-16

## 2024-08-14 RX ORDER — FENTANYL CITRATE 50 UG/ML
INJECTION, SOLUTION INTRAMUSCULAR; INTRAVENOUS PRN
Status: DISCONTINUED | OUTPATIENT
Start: 2024-08-14 | End: 2024-08-14 | Stop reason: HOSPADM

## 2024-08-14 RX ORDER — METOPROLOL SUCCINATE 50 MG/1
200 TABLET, EXTENDED RELEASE ORAL NIGHTLY
Status: DISCONTINUED | OUTPATIENT
Start: 2024-08-14 | End: 2024-08-16

## 2024-08-14 RX ORDER — ASPIRIN 81 MG/1
81 TABLET, CHEWABLE ORAL DAILY
Status: DISCONTINUED | OUTPATIENT
Start: 2024-08-14 | End: 2024-08-16

## 2024-08-14 RX ORDER — SODIUM CHLORIDE 9 MG/ML
INJECTION, SOLUTION INTRAVENOUS CONTINUOUS
Status: DISCONTINUED | OUTPATIENT
Start: 2024-08-14 | End: 2024-08-16

## 2024-08-14 RX ORDER — HEPARIN SODIUM 10000 [USP'U]/ML
INJECTION, SOLUTION INTRAVENOUS; SUBCUTANEOUS PRN
Status: DISCONTINUED | OUTPATIENT
Start: 2024-08-14 | End: 2024-08-14 | Stop reason: HOSPADM

## 2024-08-14 RX ORDER — SODIUM CHLORIDE 9 MG/ML
INJECTION, SOLUTION INTRAVENOUS PRN
Status: DISCONTINUED | OUTPATIENT
Start: 2024-08-14 | End: 2024-08-16 | Stop reason: SDUPTHER

## 2024-08-14 RX ORDER — IOPAMIDOL 755 MG/ML
INJECTION, SOLUTION INTRAVASCULAR PRN
Status: DISCONTINUED | OUTPATIENT
Start: 2024-08-14 | End: 2024-08-14 | Stop reason: HOSPADM

## 2024-08-14 RX ADMIN — METOPROLOL SUCCINATE 200 MG: 50 TABLET, EXTENDED RELEASE ORAL at 20:44

## 2024-08-14 RX ADMIN — SODIUM CHLORIDE: 9 INJECTION, SOLUTION INTRAVENOUS at 11:44

## 2024-08-14 RX ADMIN — ATORVASTATIN CALCIUM 80 MG: 40 TABLET, FILM COATED ORAL at 17:12

## 2024-08-14 RX ADMIN — SODIUM CHLORIDE 500 ML: 9 INJECTION, SOLUTION INTRAVENOUS at 20:37

## 2024-08-14 RX ADMIN — AMLODIPINE BESYLATE 10 MG: 10 TABLET ORAL at 17:12

## 2024-08-14 RX ADMIN — DIAZEPAM 5 MG: 5 TABLET ORAL at 11:44

## 2024-08-14 RX ADMIN — Medication 400 MG: at 17:12

## 2024-08-14 RX ADMIN — SODIUM CHLORIDE 500 ML: 9 INJECTION, SOLUTION INTRAVENOUS at 17:14

## 2024-08-14 RX ADMIN — DIPHENHYDRAMINE HYDROCHLORIDE 25 MG: 25 TABLET ORAL at 11:44

## 2024-08-14 RX ADMIN — ASPIRIN 81 MG: 81 TABLET, CHEWABLE ORAL at 17:12

## 2024-08-14 RX ADMIN — FAMOTIDINE 40 MG: 20 TABLET ORAL at 20:44

## 2024-08-14 RX ADMIN — HYDRALAZINE HYDROCHLORIDE 10 MG: 20 INJECTION, SOLUTION INTRAMUSCULAR; INTRAVENOUS at 17:21

## 2024-08-14 NOTE — PROGRESS NOTES
4 Eyes Skin Assessment     NAME:  Mark Bourgeois  YOB: 1955  MEDICAL RECORD NUMBER:  [id number]    The patient is being assessed for  Admission, post cath    I agree that at least one RN has performed a thorough Head to Toe Skin Assessment on the patient. ALL assessment sites listed below have been assessed.      Areas assessed by both nurses:    Head, Face, Ears, Shoulders, Back, Chest, Arms, Elbows, Hands, Sacrum. Buttock, Coccyx, Ischium, and Legs. Feet and Heels        Does the Patient have a Wound? No noted wound(s), puncture site to right radial wrist and right groin/sheath still in place at this time       Jacek Prevention initiated by RN: No  Wound Care Orders initiated by RN: No    Pressure Injury (Stage 3,4, Unstageable, DTI, NWPT, and Complex wounds) if present, place Wound referral order by RN under : No    New Ostomies, if present place, Ostomy referral order under : No     Nurse 1 eSignature: Electronically signed by Marcia Cabral RN on 8/14/24 at 6:09 PM EDT    **SHARE this note so that the co-signing nurse can place an eSignature**    Nurse 2 eSignature: Electronically signed by Kasia Dupont RN on 8/15/24 at 7:46 AM EDT

## 2024-08-14 NOTE — CONSULTS
V2.0  [facility] Consult Note      Name:  Mark Bourgeois /Age/Sex: 1955  (69 y.o. male)   MRN & CSN:  [id number] & [id number] Encounter Date/Time: 2024 5:17 PM EDT   Location:  -A PCP: Hyacinth Garcia MD     Attending:Derek Hernández MD  Consulting Provider: Lu Carver MD      Hospital Day: 1    Assessment and Recommendations   Mark Bourgeois is a 69 y.o. male who presents with Abnormal nuclear stress test    Hospital Problems             Last Modified POA    * (Principal) Abnormal nuclear stress test 2024 Unknown    ASCVD (arteriosclerotic cardiovascular disease) 2024 Yes       Recommendations:    # CAD  -Left heart cath : Proximal LAD stenosis of 70 to 80%, first OM stenosis of 70 to 80%, 100% occluded possible  or 99% stenosis of ostial RCA, RCA filled by collaterals from the left, right subclavian artery is 100% occluded  Cardiology primary  CT surgery consulted for CABG, appreciate recs  Aspirin and statin  Metoprolol succinate 200 mg nightly    # HTN  Continue amlodipine    # Class III obesity  -BMI 41.72  Will need count counseling for weight reduction  Lipid panel and hemoglobin A1c ordered    # GERD  Continue famotidine      Diet ADULT DIET; Regular   DVT Prophylaxis [] Lovenox, []  Heparin, [x] SCDs, [x] Ambulation,  [] Eliquis, [] Xarelto   Code Status Full Code   Surrogate Decision Maker/ POA  Jenna Tripathi     History Obtained From:    patient, electronic medical record    History of Present Illness:     Chief Complaint: Abnormal nuclear stress test    Mark Bourgeois is a 69 y.o. male who is seen today by the hospitalist team at the request of Dr. Scott, with a Chief Complaint of chest pain/angina.  Hospitalist team consulted for medical co-management.  Patient presented for a scheduled left heart catheterization as he was having anginal include alliance with shortness of breath on exertion.  Patient seen after the procedure.    Patient feeling well. Denies  back across the aortic valve revealing no gradient. The catheter was removed. There were no complications. Patient tolerated the procedure well. The patient was transferred to the holding area in stable condition. Findings are reviewed with patient and discussed with the family.  Questions answered. Blood Loss : 5 cc Results: Hemodynamic findings: Please see the full hemodynamics from the cath lab report Left ventricular pressure 136/36 mmHg, LVEDP was 16 mmHG Aortic pressure 135/74 mmHg Coronary anatomy: Right /  Dominant system The left main coronary artery has no significant disease. Left anterior descending artery is a type III .  Proximal LAD is diffusely diseased is about 6070% long lesion distally it is a type III vessel and gives collateral to the right coronary artery Circumflex artery has no significant disease.Om branch further bifurcates , the OM 1 has a 70 to 80% proximal stenosis The right coronary artery is a dominant vessel , the ostial RCA is 99% stenosed and may be a chronic total occlusion as distally RCA is a large vessel it is filled by collaterals from the left side After reviewing the anatomy we decided to proceed with RFR of the LAD XB 3 oh guide was used to engage the left main RFR wire was equalized and advanced into the LAD.  RFR was calculated X 3 to be 0.84 x 0.83 and 0.84 Impression: 70 to 80% proximal LAD stenosis 70 to 80% first OM stenosis 100% occluded possible  or 99% stenosis of the ostial RCA RCA is filled by collaterals from the left side RFR of the LAD is 0.84 Right subclavian artery is 100% occluded  Recommendations: Surgical evaluation for CABG Aggressive risk and lifestyle modification Findings are reviewed and discussed  with patient and family. Questions are answered.  Post procedure activity restrictions and continued risk modification and follow up recommended. Derek Scott MD, 8/14/2024 4:11 PM     XR CHEST (2 VW)    Result Date: 8/9/2024  EXAMINATION: XR CHEST  \"BLOODCULT2\"  Organism: No results found for: \"ORG\"      Electronically signed by Lu Carver MD on 8/14/2024 at 5:17 PM

## 2024-08-14 NOTE — H&P
Mark Bourgeois, 69 y.o., male    Primary care physician:  Hyacinth Garcia MD     Chief Complaint: Chest Pain    History of Present Illness:   Comes in for  irregular heartbeat he does have multiple risk factors he reports shortness of breath and leg swelling but more so on the right leg ever since he has knee surgery.   His stress test showed inferior wall  ischemia vs artifact . HE says he is more sob and has less energy   On Holter he had runs of wide-complex tachycardia although he was not symptomatic on treadmill he could only complete 3 minutes of exercise in order to have multiple PVCs   He denies any chest pain as such but has exertional shortness of breath for quite some he has been hypertensive used to have a lot of palpitation which is somewhat better after titrating up of his metoprolol there is family history of coronary artery disease and heart failure father  had  heart problems    oted to have significantly reduced exercise capacity only walked for 3 minutes on treadmill stress test showed inferior ischemia   He is sob although taking metoprolol and amlodipine  Sob Could be weight related   Will plan cath to define anatomy , he is in agreement    Past medical history:    has a past medical history of Hypertension.  Past surgical history:   has a past surgical history that includes Vasectomy; Total knee arthroplasty (Right); Tympanostomy tube placement (Left); Umbilical hernia repair; and sinus surgery.  Social History:   reports that he has never smoked. He has never used smokeless tobacco. He reports that he does not drink alcohol and does not use drugs.  Family history:  family history includes Breast Cancer in his mother; Heart Failure in his father; Kidney Disease in his daughter; No Known Problems in his sister and sister.    Allergies:    No Known Allergies    Home Medications:  Prior to Admission medications    Medication Sig Start Date End Date Taking? Authorizing Provider   atorvastatin  (LIPITOR) 40 MG tablet Take 2 tablets by mouth daily 5/23/24  Yes Maggie Kauffman APRN - CNP   metoprolol succinate (TOPROL XL) 200 MG extended release tablet Take 1 tablet by mouth at bedtime 5/16/24  Yes Maggie Kauffman APRN - CNP   amLODIPine (NORVASC) 10 MG tablet Take 1 tablet by mouth daily 4/4/24  Yes Hyacinth Garcia MD   famotidine (PEPCID) 40 MG tablet Take 1 tablet by mouth 2 times daily 4/4/24  Yes Hyacinth Garcia MD   aspirin 81 MG EC tablet Oral 7/30/15  Yes ProviderMagdiel MD   triamterene-hydroCHLOROthiazide (MAXZIDE-25) 37.5-25 MG per tablet Take 1 tablet by mouth daily as needed (swelling)  Patient not taking: Reported on 4/4/2024 2/20/24   Maggie Kauffman APRN - CNP   magnesium oxide (MAG-OX) 400 MG tablet Take 1 tablet by mouth daily  Patient not taking: Reported on 8/5/2024 11/20/23   Maggie Kauffman APRN - CNP       Review of systems: [unfilled]    Physical Examination:    Pulse 76   Temp (!) 96.1 °F (35.6 °C) (Temporal)   Resp 20   Ht 1.803 m (5' 10.98\")   Wt 135.6 kg (299 lb)   SpO2 95%   BMI 41.72 kg/m²      General Appearance:  No distress, conversant  Constitutional:  Well developed, Well nourished, No acute distress, Non-toxic appearance.   HENT:  Normocephalic, Atraumatic, Bilateral external ears normal, Oropharynx moist, No oral exudates, Nose normal. Neck- Normal range of motion, No tenderness, Supple, No stridor,no apical-carotid delay  Eyes:  PERRL, EOMI, Conjunctiva normal, No discharge.   Lymphatics: no palpable lymph nodes  Respiratory:  Normal breath sounds, No respiratory distress, No wheezing, No chest tenderness.,no uise of accessory muscles, JVP not elevated  Cardiovascular: (PMI) apex non displaced,no lifts no thrills, no s3,no s4, Normal heart rate, Normal rhythm, No murmurs, No rubs, No gallops.   GI:  Bowel sounds normal, Soft, No tenderness, No masses, No pulsatile masses, no hepatosplenomegally, no bruits  Musculoskeletal:  Intact distal pulses, No

## 2024-08-15 ENCOUNTER — APPOINTMENT (OUTPATIENT)
Dept: ULTRASOUND IMAGING | Age: 69
DRG: 233 | End: 2024-08-15
Attending: INTERNAL MEDICINE
Payer: COMMERCIAL

## 2024-08-15 ENCOUNTER — APPOINTMENT (OUTPATIENT)
Dept: NON INVASIVE DIAGNOSTICS | Age: 69
DRG: 233 | End: 2024-08-15
Attending: INTERNAL MEDICINE
Payer: COMMERCIAL

## 2024-08-15 ENCOUNTER — ANESTHESIA EVENT (OUTPATIENT)
Dept: OPERATING ROOM | Age: 69
End: 2024-08-15
Payer: COMMERCIAL

## 2024-08-15 ENCOUNTER — APPOINTMENT (OUTPATIENT)
Dept: CT IMAGING | Age: 69
DRG: 233 | End: 2024-08-15
Attending: INTERNAL MEDICINE
Payer: COMMERCIAL

## 2024-08-15 LAB
ALBUMIN SERPL-MCNC: 3.6 GM/DL (ref 3.4–5)
ALP BLD-CCNC: 83 IU/L (ref 40–128)
ALT SERPL-CCNC: 19 U/L (ref 10–40)
ANION GAP SERPL CALCULATED.3IONS-SCNC: 13 MMOL/L (ref 7–16)
APTT: 32 SECONDS (ref 25.1–37.1)
AST SERPL-CCNC: 21 IU/L (ref 15–37)
BACTERIA: NEGATIVE /HPF
BASE EXCESS MIXED: 0.6 (ref 0–3)
BASOPHILS ABSOLUTE: 0 K/CU MM
BASOPHILS RELATIVE PERCENT: 0.4 % (ref 0–1)
BILIRUB SERPL-MCNC: 0.5 MG/DL (ref 0–1)
BILIRUBIN, URINE: NEGATIVE MG/DL
BLOOD, URINE: NEGATIVE
BUN SERPL-MCNC: 13 MG/DL (ref 6–23)
CALCIUM SERPL-MCNC: 8.1 MG/DL (ref 8.3–10.6)
CARBON MONOXIDE, BLOOD: 1.8 % (ref 0–5)
CHLORIDE BLD-SCNC: 110 MMOL/L (ref 99–110)
CLARITY, UA: CLEAR
CO2 CONTENT: 25.6 MMOL/L (ref 21–32)
CO2: 20 MMOL/L (ref 21–32)
COLOR, UA: YELLOW
COMMENT: ABNORMAL
CREAT SERPL-MCNC: 0.7 MG/DL (ref 0.9–1.3)
DIFFERENTIAL TYPE: ABNORMAL
ECHO AO ROOT DIAM: 3.4 CM
ECHO AO ROOT INDEX: 1.37 CM/M2
ECHO AV AREA PEAK VELOCITY: 1.7 CM2
ECHO AV AREA VTI: 1.8 CM2
ECHO AV AREA/BSA PEAK VELOCITY: 0.7 CM2/M2
ECHO AV AREA/BSA VTI: 0.7 CM2/M2
ECHO AV MEAN GRADIENT: 4 MMHG
ECHO AV MEAN VELOCITY: 0.9 M/S
ECHO AV PEAK GRADIENT: 6 MMHG
ECHO AV PEAK VELOCITY: 1.3 M/S
ECHO AV VELOCITY RATIO: 0.62
ECHO AV VTI: 25.5 CM
ECHO BSA: 2.59 M2
ECHO EST RA PRESSURE: 3 MMHG
ECHO IVC PROX: 2 CM
ECHO LA AREA 4C: 17.2 CM2
ECHO LA DIAMETER INDEX: 1.69 CM/M2
ECHO LA DIAMETER: 4.2 CM
ECHO LA MAJOR AXIS: 5.3 CM
ECHO LA TO AORTIC ROOT RATIO: 1.24
ECHO LA VOL MOD A4C: 43 ML (ref 18–58)
ECHO LA VOLUME INDEX MOD A4C: 17 ML/M2 (ref 16–34)
ECHO LV E' LATERAL VELOCITY: 8 CM/S
ECHO LV E' SEPTAL VELOCITY: 8 CM/S
ECHO LV EDV A4C: 66 ML
ECHO LV EDV INDEX A4C: 27 ML/M2
ECHO LV EJECTION FRACTION A4C: 63 %
ECHO LV ESV A4C: 25 ML
ECHO LV ESV INDEX A4C: 10 ML/M2
ECHO LV FRACTIONAL SHORTENING: 47 % (ref 28–44)
ECHO LV INTERNAL DIMENSION DIASTOLE INDEX: 1.9 CM/M2
ECHO LV INTERNAL DIMENSION DIASTOLIC: 4.7 CM (ref 4.2–5.9)
ECHO LV INTERNAL DIMENSION SYSTOLIC INDEX: 1.01 CM/M2
ECHO LV INTERNAL DIMENSION SYSTOLIC: 2.5 CM
ECHO LV IVSD: 1.2 CM (ref 0.6–1)
ECHO LV MASS 2D: 199.6 G (ref 88–224)
ECHO LV MASS INDEX 2D: 80.5 G/M2 (ref 49–115)
ECHO LV POSTERIOR WALL DIASTOLIC: 1.1 CM (ref 0.6–1)
ECHO LV RELATIVE WALL THICKNESS RATIO: 0.47
ECHO LVOT AREA: 2.8 CM2
ECHO LVOT AV VTI INDEX: 0.64
ECHO LVOT DIAM: 1.9 CM
ECHO LVOT MEAN GRADIENT: 1 MMHG
ECHO LVOT PEAK GRADIENT: 2 MMHG
ECHO LVOT PEAK VELOCITY: 0.8 M/S
ECHO LVOT STROKE VOLUME INDEX: 18.7 ML/M2
ECHO LVOT SV: 46.5 ML
ECHO LVOT VTI: 16.4 CM
ECHO MV A VELOCITY: 1.01 M/S
ECHO MV E DECELERATION TIME (DT): 190 MS
ECHO MV E VELOCITY: 0.6 M/S
ECHO MV E/A RATIO: 0.59
ECHO MV E/E' LATERAL: 7.5
ECHO MV E/E' RATIO (AVERAGED): 7.5
ECHO MV E/E' SEPTAL: 7.5
ECHO RIGHT VENTRICULAR SYSTOLIC PRESSURE (RVSP): 30 MMHG
ECHO RV FREE WALL PEAK S': 16 CM/S
ECHO RV TAPSE: 1.9 CM (ref 1.7–?)
ECHO TV REGURGITANT MAX VELOCITY: 2.58 M/S
ECHO TV REGURGITANT PEAK GRADIENT: 27 MMHG
EKG ATRIAL RATE: 83 BPM
EKG DIAGNOSIS: NORMAL
EKG P AXIS: 25 DEGREES
EKG P-R INTERVAL: 144 MS
EKG Q-T INTERVAL: 402 MS
EKG QRS DURATION: 94 MS
EKG QTC CALCULATION (BAZETT): 472 MS
EKG R AXIS: 10 DEGREES
EKG T AXIS: -1 DEGREES
EKG VENTRICULAR RATE: 83 BPM
EOSINOPHILS ABSOLUTE: 0.2 K/CU MM
EOSINOPHILS RELATIVE PERCENT: 2.1 % (ref 0–3)
ESTIMATED AVERAGE GLUCOSE: 120 MG/DL
ESTIMATED AVERAGE GLUCOSE: 126 MG/DL
GFR, ESTIMATED: >90 ML/MIN/1.73M2
GLUCOSE SERPL-MCNC: 103 MG/DL (ref 70–99)
GLUCOSE URINE: NEGATIVE MG/DL
HBA1C MFR BLD: 5.8 % (ref 4.2–6.3)
HBA1C MFR BLD: 6 % (ref 4.2–6.3)
HCO3 ARTERIAL: 24.5 MMOL/L (ref 21–28)
HCT VFR BLD CALC: 40.4 % (ref 42–52)
HEMOGLOBIN: 13.5 GM/DL (ref 13.5–18)
HYALINE CASTS: 0 /LPF
IMMATURE NEUTROPHIL %: 0.3 % (ref 0–0.43)
INR BLD: 1 INDEX
KETONES, URINE: NEGATIVE MG/DL
LEUKOCYTE ESTERASE, URINE: ABNORMAL
LYMPHOCYTES ABSOLUTE: 1.4 K/CU MM
LYMPHOCYTES RELATIVE PERCENT: 18.8 % (ref 24–44)
MAGNESIUM: 2.3 MG/DL (ref 1.8–2.4)
MCH RBC QN AUTO: 28.9 PG (ref 27–31)
MCHC RBC AUTO-ENTMCNC: 33.4 % (ref 32–36)
MCV RBC AUTO: 86.5 FL (ref 78–100)
METHEMOGLOBIN ARTERIAL: 1.1 %
MONOCYTES ABSOLUTE: 0.7 K/CU MM
MONOCYTES RELATIVE PERCENT: 8.9 % (ref 0–4)
MRSA, DNA, NASAL: NEGATIVE
MUCUS: ABNORMAL HPF
NEUTROPHILS ABSOLUTE: 5.2 K/CU MM
NEUTROPHILS RELATIVE PERCENT: 69.5 % (ref 36–66)
NITRITE URINE, QUANTITATIVE: NEGATIVE
NUCLEATED RBC %: 0 %
O2 SATURATION: 93.3 % (ref 94–98)
PCO2 ARTERIAL: 36 MMHG (ref 35–48)
PDW BLD-RTO: 13.1 % (ref 11.7–14.9)
PH BLOOD: 7.44 (ref 7.35–7.45)
PH, URINE: 5.5 (ref 5–8)
PLATELET # BLD: 246 K/CU MM (ref 140–440)
PMV BLD AUTO: 9.9 FL (ref 7.5–11.1)
PO2 ARTERIAL: 68 MMHG (ref 83–108)
POTASSIUM SERPL-SCNC: 3.6 MMOL/L (ref 3.5–5.1)
PRO-BNP: 125 PG/ML
PROTEIN UA: NEGATIVE MG/DL
PROTHROMBIN TIME: 14 SECONDS (ref 11.7–14.5)
RBC # BLD: 4.67 M/CU MM (ref 4.6–6.2)
RBC URINE: <1 /HPF (ref 0–3)
SODIUM BLD-SCNC: 143 MMOL/L (ref 135–145)
SPECIFIC GRAVITY UA: 1.01 (ref 1–1.03)
SPECIMEN DESCRIPTION: NORMAL
TOTAL IMMATURE NEUTOROPHIL: 0.02 K/CU MM
TOTAL NUCLEATED RBC: 0 K/CU MM
TOTAL PROTEIN: 6.2 GM/DL (ref 6.4–8.2)
TRICHOMONAS: ABNORMAL /HPF
UROBILINOGEN, URINE: 0.2 MG/DL (ref 0.2–1)
WBC # BLD: 7.5 K/CU MM (ref 4–10.5)
WBC UA: 1 /HPF (ref 0–2)

## 2024-08-15 PROCEDURE — 83036 HEMOGLOBIN GLYCOSYLATED A1C: CPT

## 2024-08-15 PROCEDURE — 85730 THROMBOPLASTIN TIME PARTIAL: CPT

## 2024-08-15 PROCEDURE — 2580000003 HC RX 258: Performed by: INTERNAL MEDICINE

## 2024-08-15 PROCEDURE — 2500000003 HC RX 250 WO HCPCS: Performed by: INTERNAL MEDICINE

## 2024-08-15 PROCEDURE — 86901 BLOOD TYPING SEROLOGIC RH(D): CPT

## 2024-08-15 PROCEDURE — 86850 RBC ANTIBODY SCREEN: CPT

## 2024-08-15 PROCEDURE — 94761 N-INVAS EAR/PLS OXIMETRY MLT: CPT

## 2024-08-15 PROCEDURE — 83880 ASSAY OF NATRIURETIC PEPTIDE: CPT

## 2024-08-15 PROCEDURE — 93306 TTE W/DOPPLER COMPLETE: CPT

## 2024-08-15 PROCEDURE — 85610 PROTHROMBIN TIME: CPT

## 2024-08-15 PROCEDURE — 6360000002 HC RX W HCPCS: Performed by: PHYSICIAN ASSISTANT

## 2024-08-15 PROCEDURE — 86922 COMPATIBILITY TEST ANTIGLOB: CPT

## 2024-08-15 PROCEDURE — 6370000000 HC RX 637 (ALT 250 FOR IP): Performed by: INTERNAL MEDICINE

## 2024-08-15 PROCEDURE — APPNB15 APP NON BILLABLE TIME 0-15 MINS

## 2024-08-15 PROCEDURE — 6360000002 HC RX W HCPCS

## 2024-08-15 PROCEDURE — 93010 ELECTROCARDIOGRAM REPORT: CPT | Performed by: INTERNAL MEDICINE

## 2024-08-15 PROCEDURE — 36415 COLL VENOUS BLD VENIPUNCTURE: CPT

## 2024-08-15 PROCEDURE — 2100000000 HC CCU R&B

## 2024-08-15 PROCEDURE — P9017 PLASMA 1 DONOR FRZ W/IN 8 HR: HCPCS

## 2024-08-15 PROCEDURE — 93970 EXTREMITY STUDY: CPT

## 2024-08-15 PROCEDURE — 93005 ELECTROCARDIOGRAM TRACING: CPT | Performed by: PHYSICIAN ASSISTANT

## 2024-08-15 PROCEDURE — 81001 URINALYSIS AUTO W/SCOPE: CPT

## 2024-08-15 PROCEDURE — 71250 CT THORAX DX C-: CPT

## 2024-08-15 PROCEDURE — 36600 WITHDRAWAL OF ARTERIAL BLOOD: CPT

## 2024-08-15 PROCEDURE — 82803 BLOOD GASES ANY COMBINATION: CPT

## 2024-08-15 PROCEDURE — 83735 ASSAY OF MAGNESIUM: CPT

## 2024-08-15 PROCEDURE — 87641 MR-STAPH DNA AMP PROBE: CPT

## 2024-08-15 PROCEDURE — 93306 TTE W/DOPPLER COMPLETE: CPT | Performed by: INTERNAL MEDICINE

## 2024-08-15 PROCEDURE — 6370000000 HC RX 637 (ALT 250 FOR IP): Performed by: PHYSICIAN ASSISTANT

## 2024-08-15 PROCEDURE — 85025 COMPLETE CBC W/AUTO DIFF WBC: CPT

## 2024-08-15 PROCEDURE — 93880 EXTRACRANIAL BILAT STUDY: CPT

## 2024-08-15 PROCEDURE — 94010 BREATHING CAPACITY TEST: CPT

## 2024-08-15 PROCEDURE — 80053 COMPREHEN METABOLIC PANEL: CPT

## 2024-08-15 PROCEDURE — 86900 BLOOD TYPING SEROLOGIC ABO: CPT

## 2024-08-15 RX ORDER — MUPIROCIN 20 MG/G
OINTMENT TOPICAL 2 TIMES DAILY
Status: DISCONTINUED | OUTPATIENT
Start: 2024-08-15 | End: 2024-08-16 | Stop reason: HOSPADM

## 2024-08-15 RX ORDER — SODIUM CHLORIDE 9 MG/ML
INJECTION, SOLUTION INTRAVENOUS PRN
Status: DISCONTINUED | OUTPATIENT
Start: 2024-08-15 | End: 2024-08-16

## 2024-08-15 RX ORDER — CHLORHEXIDINE GLUCONATE 40 MG/ML
SOLUTION TOPICAL SEE ADMIN INSTRUCTIONS
Status: DISCONTINUED | OUTPATIENT
Start: 2024-08-16 | End: 2024-08-16 | Stop reason: HOSPADM

## 2024-08-15 RX ORDER — SODIUM CHLORIDE 0.9 % (FLUSH) 0.9 %
5-40 SYRINGE (ML) INJECTION PRN
Status: DISCONTINUED | OUTPATIENT
Start: 2024-08-15 | End: 2024-08-16 | Stop reason: SDUPTHER

## 2024-08-15 RX ORDER — DEXMEDETOMIDINE HYDROCHLORIDE 4 UG/ML
.1-1.5 INJECTION, SOLUTION INTRAVENOUS
Status: DISCONTINUED | OUTPATIENT
Start: 2024-08-16 | End: 2024-08-15

## 2024-08-15 RX ORDER — SODIUM CHLORIDE 0.9 % (FLUSH) 0.9 %
5-40 SYRINGE (ML) INJECTION EVERY 12 HOURS SCHEDULED
Status: DISCONTINUED | OUTPATIENT
Start: 2024-08-15 | End: 2024-08-16

## 2024-08-15 RX ORDER — SILVER SULFADIAZINE 10 MG/G
CREAM TOPICAL DAILY
Status: DISCONTINUED | OUTPATIENT
Start: 2024-08-15 | End: 2024-08-16

## 2024-08-15 RX ORDER — LORAZEPAM 2 MG/ML
0.5 INJECTION INTRAMUSCULAR
Status: COMPLETED | OUTPATIENT
Start: 2024-08-15 | End: 2024-08-15

## 2024-08-15 RX ORDER — FUROSEMIDE 10 MG/ML
40 INJECTION INTRAMUSCULAR; INTRAVENOUS ONCE
Status: COMPLETED | OUTPATIENT
Start: 2024-08-15 | End: 2024-08-15

## 2024-08-15 RX ORDER — DEXMEDETOMIDINE HYDROCHLORIDE 4 UG/ML
.1-1.5 INJECTION, SOLUTION INTRAVENOUS
Status: COMPLETED | OUTPATIENT
Start: 2024-08-16 | End: 2024-08-16

## 2024-08-15 RX ORDER — CHLORHEXIDINE GLUCONATE ORAL RINSE 1.2 MG/ML
15 SOLUTION DENTAL ONCE
Status: COMPLETED | OUTPATIENT
Start: 2024-08-16 | End: 2024-08-16

## 2024-08-15 RX ADMIN — SILVER SULFADIAZINE: 10 CREAM TOPICAL at 11:23

## 2024-08-15 RX ADMIN — AMLODIPINE BESYLATE 10 MG: 10 TABLET ORAL at 09:11

## 2024-08-15 RX ADMIN — FAMOTIDINE 40 MG: 20 TABLET ORAL at 09:11

## 2024-08-15 RX ADMIN — SODIUM CHLORIDE: 9 INJECTION, SOLUTION INTRAVENOUS at 06:02

## 2024-08-15 RX ADMIN — SODIUM CHLORIDE, PRESERVATIVE FREE 10 ML: 5 INJECTION INTRAVENOUS at 09:11

## 2024-08-15 RX ADMIN — ATORVASTATIN CALCIUM 80 MG: 40 TABLET, FILM COATED ORAL at 09:11

## 2024-08-15 RX ADMIN — SODIUM CHLORIDE, PRESERVATIVE FREE 10 ML: 5 INJECTION INTRAVENOUS at 19:26

## 2024-08-15 RX ADMIN — Medication 400 MG: at 09:11

## 2024-08-15 RX ADMIN — FUROSEMIDE 40 MG: 10 INJECTION, SOLUTION INTRAMUSCULAR; INTRAVENOUS at 11:23

## 2024-08-15 RX ADMIN — METOPROLOL SUCCINATE 200 MG: 50 TABLET, EXTENDED RELEASE ORAL at 19:26

## 2024-08-15 RX ADMIN — LORAZEPAM 0.5 MG: 2 INJECTION INTRAMUSCULAR; INTRAVENOUS at 21:51

## 2024-08-15 RX ADMIN — FAMOTIDINE 40 MG: 20 TABLET ORAL at 19:26

## 2024-08-15 RX ADMIN — ACETAMINOPHEN 650 MG: 325 TABLET ORAL at 00:20

## 2024-08-15 RX ADMIN — ASPIRIN 81 MG: 81 TABLET, CHEWABLE ORAL at 09:11

## 2024-08-15 RX ADMIN — MUPIROCIN: 20 OINTMENT TOPICAL at 18:47

## 2024-08-15 RX ADMIN — SILVER SULFADIAZINE: 10 CREAM TOPICAL at 21:45

## 2024-08-15 ASSESSMENT — PAIN DESCRIPTION - ORIENTATION: ORIENTATION: MID

## 2024-08-15 ASSESSMENT — PAIN - FUNCTIONAL ASSESSMENT: PAIN_FUNCTIONAL_ASSESSMENT: ACTIVITIES ARE NOT PREVENTED

## 2024-08-15 ASSESSMENT — ENCOUNTER SYMPTOMS: SHORTNESS OF BREATH: 1

## 2024-08-15 ASSESSMENT — PAIN DESCRIPTION - DESCRIPTORS: DESCRIPTORS: ACHING

## 2024-08-15 ASSESSMENT — PAIN DESCRIPTION - LOCATION: LOCATION: HEAD

## 2024-08-15 ASSESSMENT — PAIN SCALES - GENERAL
PAINLEVEL_OUTOF10: 3
PAINLEVEL_OUTOF10: 0

## 2024-08-15 ASSESSMENT — LIFESTYLE VARIABLES: SMOKING_STATUS: 0

## 2024-08-15 NOTE — CONSULTS
Cardiothoracic Surgery  IN-PATIENT SERVICE   [facility]    CONSULTATION / HISTORY AND PHYSICAL EXAMINATION            Date:   8/15/2024  Patient name:  Mark Bourgeois  Date of admission:  8/14/2024 11:13 AM  MRN:   [id number]  Account:  [id number]  YOB: 1955  PCP:    Hyacinth Garcia MD  Room:   2026/2026-A  Code Status:    Full Code    Physician Requesting Consult: Derek Hernández MD    Reason for Consult: Multivessel coronary artery disease    Chief Complaint:     Shortness of breath    History of Present Illness:     Patient is a 69-year-old male with a past medical history significant for hypertension, total knee replacement, hypertension, hyperlipidemia.  Patient was admitted to the hospital by cardiology for irregular heartbeat on Holter monitor with sinus tachycardia and PVCs.  Patient states has been short of breath and having lower extremity edema for a while now.  He had an outpatient stress test that was positive for inferior wall ischemia versus artifact.  He denies any chest pain, fever, chills, nausea, vomiting, headache, lightheadedness, dizziness, or palpitations.  He denies any symptoms at rest.  He has a family history of coronary artery disease and heart failure.  Cardiology admitted the patient after left heart catheterization revealed severe multivessel coronary artery disease.  CT surgery team was consulted to assess the patient for possible surgical intervention.    Past Medical History:     Past Medical History:   Diagnosis Date    Hypertension         Past Surgical History:     Past Surgical History:   Procedure Laterality Date    CARDIAC PROCEDURE N/A 8/14/2024    Left heart cath / coronary angiography performed by Derek Hernández MD at [facility] CARDIAC CATH LAB    SINUS SURGERY      TOTAL KNEE ARTHROPLASTY Right     TYMPANOSTOMY TUBE PLACEMENT Left     UMBILICAL HERNIA REPAIR      VASECTOMY          Medications Prior to  pain   GENITOURINARY:  negative for difficulty of urination, burning with urination, frequency   INTEGUMENT:  negative for rash, skin lesions, easy bruising   HEMATOLOGIC/LYMPHATIC:  negative for swelling/edema   ALLERGIC/IMMUNOLOGIC:  negative for urticaria , itching  ENDOCRINE:  negative increase in drinking, increase in urination, hot or cold intolerance  MUSCULOSKELETAL:  negative joint pains, muscle aches, swelling of joints  NEUROLOGICAL:  negative for headaches, dizziness, lightheadedness, numbness, pain, tingling extremities  BEHAVIOR/PSYCH:  negative for depression, anxiety    Physical Exam:     BP (!) 153/83   Pulse 83   Temp 98.3 °F (36.8 °C) (Oral)   Resp 16   Ht 1.803 m (5' 10.98\")   Wt 135.6 kg (299 lb)   SpO2 94%   BMI 41.72 kg/m²   Temp (24hrs), Av.6 °F (36.4 °C), Min:96.9 °F (36.1 °C), Max:98.3 °F (36.8 °C)      No results for input(s): \"POCGLU\" in the last 72 hours.    Intake/Output Summary (Last 24 hours) at 8/15/2024 1240  Last data filed at 8/15/2024 0900  Gross per 24 hour   Intake 300 ml   Output 365 ml   Net -65 ml       General Appearance:  alert, well appearing, and in no acute distress  Mental status: oriented to person, place, and time with normal affect  Head:  normocephalic, atraumatic.  Eye: no icterus, redness, pupils equal and reactive, extraocular eye movements intact, conjunctiva clear  Ear: normal external ear, no discharge, hearing intact  Nose:  no drainage noted  Mouth: mucous membranes moist  Neck: supple, no carotid bruits  Lungs: Bilateral equal air entry, clear to ausculation, no wheezing, rales or rhonchi, normal effort  Cardiovascular: normal rate, regular rhythm, no murmur, gallop, rub.  Abdomen: Soft, nontender, nondistended, normal bowel sounds  Neurologic: There are no new focal motor or sensory deficits, normal muscle tone and bulk, no abnormal sensation, normal speech  Skin: No gross lesions, rashes, bruising or bleeding on exposed skin area  Extremities:    Deep Sternal Wound Infection 0.198%   Long Hospital Stay (>14 days) 2.62%   Short Hospital Stay (<6 days)* 52.3%         Clinical Summary       Planned Surgery: Isolated CABG, Elective, First cardiovascular surgery   Demographics: 69 year old, male, 135kg, 180cm, BMI: 41.7 kg/m²   Lab Values: Creatinine: 0.7 mg/dL, Hematocrit: 40.4%, WBC Count: 7.5 10³/?L, Platelet Count: 327118 cells/?L   Substance Abuse: Never smoker   Risk Factors / Comorbidities: Hypertension, Family Hx of CAD   Cardiac Status: Ejection Fraction = 55%   Coronary Artery Disease: 3 vessels diseased, Proximal LAD Stenosis ? 70%, No coronary symptoms   Valve Disease: Mild TR           Assessment :      CAD  Heart Failure  HTN  HLD    Is the patient on a blood thinner? None  Most recent dental exam: None  Significant allergies: none  Beta-blocker started? Metoprolol  ACEi/ARBs held? None  History of afib?None     Plan:       Recommendation:  I believe Mr. Bourgeois would benefit optimally from CABG    I have discussed the proposed procedure, along with its risks, benefits, and therapeutic alternatives.  Specific risks discussed included death, stroke, mediastinitis, re-operation for bleeding, and atrial fibrillation.  We have also discussed the potential need for blood transfusion, along with its risks and benefits.  STS Operative Risk per above calculations have been discussed with the patient. He appears to understand, and consents to proceed.  Surgery has been tentatively scheduled for tomorrow.    Preop Orders placed  Preop testing placed      Graciela Chavez PA-C 08/15/24 12:40 PM      New Consults 8:00AM-4:30PM: Call Office, 4:30PM to 8:00AM Surgeon on-call    CVICU or other units patient follow up: Keshawn author of this note 8:00AM-4:30PM    CVICU patient or urgent follow up: 4:30PM to 8:00AM Call or Page Surgeon on-call     Step-down patient follow up: 4:30PM to 8:00AM Page or secure chat PA on-call

## 2024-08-15 NOTE — PROGRESS NOTES
V2.0  [facility] Consult Note      Name:  Mark Bourgeois /Age/Sex: 1955  (69 y.o. male)   MRN & CSN:  [id number] & [id number] Encounter Date/Time: 8/15/2024 5:17 PM EDT   Location:  -A PCP: Hyacinth Garcia MD     Attending:Derek Hernández MD  Consulting Provider: Lu Carver MD      Hospital Day: 2    Assessment and Recommendations   Mark Bourgeois is a 69 y.o. male who presents with Abnormal nuclear stress test    Recommendations:    # CAD  -Left heart cath : Proximal LAD stenosis of 70 to 80%, first OM stenosis of 70 to 80%, 100% occluded possible  or 99% stenosis of ostial RCA, RCA filled by collaterals from the left, right subclavian artery is 100% occluded  -completed CT chest, BLE vein mapping and carotid duplex  Cardiology primary  CT surgery consulted for CABG, appreciate recs  Aspirin and statin  Metoprolol succinate 200 mg nightly  Echo ordered and pending    #Lung nodule x2  -3.7 cm x 2.4 cm nodule at the L paratracheal location (thyroid nodule vs mediastinal adenopathy) and 8 mm nodule of RLL  CT and/or thyroid US as outpatient   Repeat CT chest in 6-12 months  to reassess RLL nodule    # HTN  Continue amlodipine    # Class III obesity  -BMI 41.72  -low HDL, A1c: 5.8%  Will need count counseling for weight reduction    # GERD  Continue famotidine      Diet ADULT DIET; Regular   DVT Prophylaxis [] Lovenox, []  Heparin, [x] SCDs, [x] Ambulation,  [] Eliquis, [] Xarelto   Code Status Full Code   Surrogate Decision Maker/ POA  Jenna Tripathi     Subjective:     Patient without any complaints this afternoon. Denies chest pain or SOB. He has been busy with testing.    Review of Systems:    Review of Systems    As above    Objective:     Intake/Output Summary (Last 24 hours) at 8/15/2024 0820  Last data filed at 8/15/2024 0700  Gross per 24 hour   Intake 120 ml   Output 365 ml   Net -245 ml      Vitals:   Vitals:    08/15/24 0400 08/15/24 0500 08/15/24 0600 08/15/24 0700   BP: 130/68  was removed. There were no complications. Patient tolerated the procedure well. The patient was transferred to the holding area in stable condition. Findings are reviewed with patient and discussed with the family.  Questions answered. Blood Loss : 5 cc Results: Hemodynamic findings: Please see the full hemodynamics from the cath lab report Left ventricular pressure 136/36 mmHg, LVEDP was 16 mmHG Aortic pressure 135/74 mmHg Coronary anatomy: Right /  Dominant system The left main coronary artery has no significant disease. Left anterior descending artery is a type III .  Proximal LAD is diffusely diseased is about 6070% long lesion distally it is a type III vessel and gives collateral to the right coronary artery Circumflex artery has no significant disease.Om branch further bifurcates , the OM 1 has a 70 to 80% proximal stenosis The right coronary artery is a dominant vessel , the ostial RCA is 99% stenosed and may be a chronic total occlusion as distally RCA is a large vessel it is filled by collaterals from the left side After reviewing the anatomy we decided to proceed with RFR of the LAD XB 3 oh guide was used to engage the left main RFR wire was equalized and advanced into the LAD.  RFR was calculated X 3 to be 0.84 x 0.83 and 0.84 Impression: 70 to 80% proximal LAD stenosis 70 to 80% first OM stenosis 100% occluded possible  or 99% stenosis of the ostial RCA RCA is filled by collaterals from the left side RFR of the LAD is 0.84 Right subclavian artery is 100% occluded  Recommendations: Surgical evaluation for CABG Aggressive risk and lifestyle modification Findings are reviewed and discussed  with patient and family. Questions are answered.  Post procedure activity restrictions and continued risk modification and follow up recommended. Derek Scott MD, 8/14/2024 4:11 PM     XR CHEST (2 VW)    Result Date: 8/9/2024  EXAMINATION: XR CHEST (2 VW) EXAM DATE: 8/9/2024 11:10 AM INDICATION: Encounter for

## 2024-08-15 NOTE — ANESTHESIA PRE PROCEDURE
Department of Anesthesiology  Preprocedure Note       Name:  Mark Bourgeois   Age:  69 y.o.  :  1955                                          MRN:  [id number]         Date:  8/15/2024      Surgeon: Surgeon(s):  Eddie Zavala MD    Procedure: Procedure(s):  CORONARY ARTERY BYPASS GRAFT X4    Medications prior to admission:   Prior to Admission medications    Medication Sig Start Date End Date Taking? Authorizing Provider   atorvastatin (LIPITOR) 40 MG tablet Take 2 tablets by mouth daily 24  Yes Maggie Kauffman APRN - CNP   metoprolol succinate (TOPROL XL) 200 MG extended release tablet Take 1 tablet by mouth at bedtime 24  Yes Maggie Kauffman APRN - CNP   amLODIPine (NORVASC) 10 MG tablet Take 1 tablet by mouth daily 24  Yes Hyacinth Garcia MD   famotidine (PEPCID) 40 MG tablet Take 1 tablet by mouth 2 times daily 24  Yes Hyacinth Garcia MD   aspirin 81 MG EC tablet Oral 7/30/15  Yes Provider, MD Magdiel   triamterene-hydroCHLOROthiazide (MAXZIDE-25) 37.5-25 MG per tablet Take 1 tablet by mouth daily as needed (swelling)  Patient not taking: Reported on 2024   Maggie Kauffman APRN - CNP   magnesium oxide (MAG-OX) 400 MG tablet Take 1 tablet by mouth daily  Patient not taking: Reported on 23   Maggie Kauffman APRN - CNP       Current medications:    Current Facility-Administered Medications   Medication Dose Route Frequency Provider Last Rate Last Admin   • silver sulfADIAZINE (SILVADENE) 1 % cream   Topical Daily Lu Carver MD   Given at 08/15/24 1123   • sodium chloride flush 0.9 % injection 5-40 mL  5-40 mL IntraVENous 2 times per day Graciela Chavez PA-C       • sodium chloride flush 0.9 % injection 5-40 mL  5-40 mL IntraVENous PRN Graciela Chavez PA-C       • 0.9 % sodium chloride infusion   IntraVENous PRN Graciela Chavez PA-C       • [START ON 2024] ceFAZolin Sodium (ANCEF) 3,000 mg in sodium chloride 0.9 % 100 mL

## 2024-08-15 NOTE — PROGRESS NOTES
Manual pressure released from right femoral arterial sheath site at this time. No bleeding, oozing, or hematoma noted. Gauze and dressing applied to sheath site. Pt educated on bedrest restrictions and precautions, pt voiced understanding. Right pedal pulse confirmed and noted with doppler.

## 2024-08-15 NOTE — PROGRESS NOTES
Chart reviewed by cardiac rehab nurse. Met patient in room 2026. Introduced myself and teaching plan.  Patient's planned procedure is CABG. Teaching done on A&P of the heart and formation of CAD. Explained the surgical process, Pre-Op,Inter-Op and Post-Op. Discussed length of stay and recovery.       Explanation given of pre op routine tests, lines/tubes/equipment, and infection control. Educated on weaning from ventilator, medication and equipment to expect, on progressive activity, post op pain, and how it will be managed. Encouraged pt to communicate about pain in order to create a partnership for his recovery success. Discussed importance of coughing and deep breathing and sternal precautions. Introduced multidisciplinary approach and daily routine in CVICU. Pt verbalized commitment to recovery program.        Teaching done on post discharge recovery, activity guidelines, and weight monitoring. Discussed follow up appointments, possibility of ECF, role of home health, and family involvement. Introduced benefits of out patient cardiac rehab after appropriate time frame. Went over visitation policy with patient. Given Understanding Heart Surgery book. All questions answered at this time.

## 2024-08-15 NOTE — CONSULTS
[facility] Contin[facility] Nurse  Consult Note       Mark Bourgeois  AGE: 69 y.o.   GENDER: male  : 1955  TODAY'S DATE:  8/15/2024    Subjective:     Reason for CWOCN Evaluation and Assessment: wound assessment      Mark Bourgeois is a 69 y.o. male referred by:   [x] Physician  [] Nursing  [] Other:     Wound Identification:  Wound Type: burn  Contributing Factors: obesity        PAST MEDICAL HISTORY        Diagnosis Date    Hypertension        PAST SURGICAL HISTORY    Past Surgical History:   Procedure Laterality Date    CARDIAC PROCEDURE N/A 2024    Left heart cath / coronary angiography performed by Derek Hernández MD at [facility] CARDIAC CATH LAB    SINUS SURGERY      TOTAL KNEE ARTHROPLASTY Right     TYMPANOSTOMY TUBE PLACEMENT Left     UMBILICAL HERNIA REPAIR      VASECTOMY         FAMILY HISTORY    Family History   Problem Relation Age of Onset    Breast Cancer Mother          58    Heart Failure Father     No Known Problems Sister     No Known Problems Sister     Kidney Disease Daughter         dialysis       SOCIAL HISTORY    Social History     Tobacco Use    Smoking status: Never    Smokeless tobacco: Never   Substance Use Topics    Alcohol use: No     Comment: Caffiene - 1 large cup of tea/day    Drug use: No       ALLERGIES    No Known Allergies    MEDICATIONS    No current facility-administered medications on file prior to encounter.     Current Outpatient Medications on File Prior to Encounter   Medication Sig Dispense Refill    atorvastatin (LIPITOR) 40 MG tablet Take 2 tablets by mouth daily 90 tablet 3    metoprolol succinate (TOPROL XL) 200 MG extended release tablet Take 1 tablet by mouth at bedtime 90 tablet 1    amLODIPine (NORVASC) 10 MG tablet Take 1 tablet by mouth daily 90 tablet 1    famotidine (PEPCID) 40 MG tablet Take 1 tablet by mouth 2 times daily 180 tablet 1    aspirin 81 MG EC tablet Oral      triamterene-hydroCHLOROthiazide (MAXZIDE-25) 37.5-25 MG per tablet  (premature ventricular contraction)    SVT (supraventricular tachycardia) (HCC)    Prediabetes    Bilateral leg edema    Abnormal nuclear stress test    ASCVD (arteriosclerotic cardiovascular disease)       Measurements:  Wound 08/15/24 Abdomen Lower;Medial (Active)   Wound Image   08/15/24 0828   Wound Etiology Burn 08/15/24 0828   Dressing Status New dressing applied 08/15/24 0828   Wound Cleansed Cleansed with saline 08/15/24 0828   Dressing/Treatment Non adherent;Gauze dressing/dressing sponge 08/15/24 0828   Wound Length (cm) 4.5 cm 08/15/24 0828   Wound Width (cm) 8 cm 08/15/24 0828   Wound Depth (cm) 0.1 cm 08/15/24 0828   Wound Surface Area (cm^2) 36 cm^2 08/15/24 0828   Wound Volume (cm^3) 3.6 cm^3 08/15/24 0828   Distance Tunneling (cm) 0 cm 08/15/24 0828   Tunneling Position ___ O'Clock 0 08/15/24 0828   Undermining Starts ___ O'Clock 0 08/15/24 0828   Undermining Ends___ O'Clock 0 08/15/24 0828   Undermining Maxium Distance (cm) 0 08/15/24 0828   Wound Assessment Ruptured blister;Pink/red;Fluid filled blister 08/15/24 0828   Drainage Amount Small (< 25%) 08/15/24 0828   Drainage Description Serous 08/15/24 0828   Odor None 08/15/24 0828   Stephanie-wound Assessment Intact 08/15/24 0828   Margins Attached edges;Defined edges 08/15/24 0828   Wound Thickness Description not for Pressure Injury Partial thickness 08/15/24 0828   Number of days: 0       Response to treatment:  Well tolerated by patient.     Pain Assessment:  Severity:  none  Quality of pain: na  Wound Pain Timing/Severity: na  Premedicated: no    Plan:     Plan of Care: Wound 08/15/24 Abdomen Lower;Medial-Dressing/Treatment: Non adherent, Gauze dressing/dressing sponge    Pt in recliner. Agreeable to wound assessment. Stated spilt paint thinner on abdomen past Sunday (4 days ago) causing burn. Has been treating at home. Not painful. Ruptured and intact blister noted. Appears to be partial thickness. Recommend silvadene cream and non adherent gauze.

## 2024-08-15 NOTE — PROGRESS NOTES
CVICU Open Heart Risk Factor Score    STS Criteria  Possible Score Yes/No Score Notes   Emergency Case 6 No  Ca- 8.1  K- 3.6   Serum Creatinine     Cr- 0.7   >1.2 0 No     >1.6 to <1.8mg/dl 1 No     >.1.9 4 No     Acute/Chronic Renal Failure/Renal Insufficiency 0   No  Cr- 0.7  GFR- >90     Left Ventricular Dysfunction(EF<40%) 3   No  EF- 55-60%   Operative Mitral Valve Insufficiency 3   No     Reoperation 3 No     Age >65 and <74 years 1  Yes   1 Age- 69   Age >75 years 2 No     Prior Vascular Surgery 2   No     COPD +History of Patient Use of Bronchodilators 2   No     COPD 0 No     Current Smoker of History of Smoking  0   No     Anemia (Hematocrit<0.34) 2   No  Hct- 40.4   ASA / Anticoagulants/ Bleeding Tendencies / Antiplatelets 0   No  PT- 14.0  INR- 1.0  PLT- 246   Operative Aortic Valve Stenosis 1 No       Weight<143 lbs (  BMI<18.5) 1   No  299 lb  135.6 kg  BMI- 41.72  Ht- 5'10 in   Weight >200 lbs (BMI >30    0   Yes 0    Diabetes Oral or Insulin Therapy 1   No  HA1C- 5.8   Cerebrovascular Disease 1   No     Impaired Mobility 0 No     Walker/Cane/Wheelchair 0 No     Recent MI 0 No     Hypertension 0 Yes 0 Home meds-   Toprol XL   Peripheral Vascular Disease 0 No     Lives Alone 0 Yes 0    Other (GI Auto Immune Hepatic etc) 0 No  -  -  -   TOTAL   1    Note The surgeon must be notified for all risk scores >7

## 2024-08-15 NOTE — PROGRESS NOTES
Bedside Spirometry    FVC               Actual  3.24 ---  75  %Predicted  FEV1             Actual 2.13  ---  62  %Predicted  FEV1/FVC     Actual 65.7  ---  84  %Predicted  FEF 25-75     Actual 1.13  ---  34  %Predicted    Obstruction and possible restriction.

## 2024-08-15 NOTE — CARE COORDINATION
08/15/24 1233   Service Assessment   Patient Orientation Alert and Oriented   Cognition Alert   History Provided By Patient   Primary Caregiver Self   Support Systems Children;Family Members   PCP Verified by CM Yes   Last Visit to PCP Within last 6 months   Prior Functional Level Independent in ADLs/IADLs   Current Functional Level Assistance with the following:;Bathing;Toileting;Mobility  (Hospital policy)   Can patient return to prior living arrangement Yes   Ability to make needs known: Good   Family able to assist with home care needs: Yes   Would you like for me to discuss the discharge plan with any other family members/significant others, and if so, who? No   Financial Resources Medicare   Community Resources None   Social/Functional History   Lives With Daughter;Other (comment)  (2 grandsons)   Type of Home House   Home Layout Multi-level;Performs ADL's on one level;Able to Live on Main level with bedroom/bathroom   Home Access Stairs to enter with rails   Entrance Stairs - Number of Steps 3-4   Home Equipment None   Receives Help From Other (comment)  (n/a)   ADL Assistance Independent   Homemaking Assistance Independent   Ambulation Assistance Independent   Transfer Assistance Independent   Active  Yes   Discharge Planning   Type of Residence House   Living Arrangements Children;Other (Comment)  (grandchildren)   Current Services Prior To Admission None   Type of Home Care Services None   Patient expects to be discharged to: House     CM reviewed chart and in to see pt. Pt is from home. Pt's daughter and two grandsons live with pt. Pt drives and is completely independent. No DME. Pt stated that pt still rides a [employer]. Pt's plan to to return home. Declined C. Stated a daughter whom lives close by is a nurse. Good family support. NO needs ID'd.

## 2024-08-15 NOTE — PROGRESS NOTES
Cardiology Progress Note    Subjective/Overnight Events:  Discussed care with patient as well as several family members at bedside.    Patient is not experience any chest pain, palpitations, headedness, dizziness, shortness of breath, fatigue, excessive diaphoresis.  Patient does have lower extremity edema however this is a chronic issue    Right femoral and right radial artery access sites are clean dry without any obvious signs of bleeding hematoma or infection    Discussed care with CT surgery PA.  Potential plans for CABG tomorrow    Assessment/Plan:  Severe multivessel coronary artery disease  Right subclavian artery occlusion  -Chest pain-free at this time.  -Unable to achieve radial artery access due to subclavian artery occlusion, switch to femoral approach.  -LHC revealing 99% RCA with collaterals from LAD, 80% OM1, 70-80% LAD  -CT surgery consulted, possible CABG for tomorrow  -Continue aspirin and Lipitor 80 mg nightly  Lower extremity edema  -Check echocardiogram as well as BNP.  -Give one-time dose of IV Lasix 40 mg daily.  -Could be secondary to Norvasc  Hypertension  -Blood pressure well-controlled at this time.  -Hold Norvasc              Ming Tapia PA-C 08/15/24 12:06 PM

## 2024-08-15 NOTE — PROGRESS NOTES
Spiritual Health Assessment/Progress Note  [facility]    Initial Encounter,  ,  ,      Name: Mark Bourgeois MRN: [id number]    Age: 69 y.o.     Sex: male   Language: English   Confucianist: None   Abnormal nuclear stress test     Date: 8/15/2024            Total Time Calculated: 15 min              Spiritual Assessment began in [facility]PD[facility]        Referral/Consult From: Rounding   Encounter Overview/Reason: Initial Encounter  Service Provided For: Patient, Friend    Harriet, Belief, Meaning:   Patient identifies as spiritual, is connected with a harriet tradition or spiritual practice, and has beliefs or practices that help with coping during difficult times  Family/Friends identify as spiritual and are connected with a harriet tradition or spiritual practice      Importance and Influence:  Patient has spiritual/personal beliefs that influence decisions regarding their health  Family/Friends have spiritual/personal beliefs that influence decisions regarding the patient's health    Community:  Patient is connected with a spiritual community and feels well-supported. Support system includes: Spouse/Partner and Friends  Family/Friends are connected with a spiritual community:    Assessment and Plan of Care:     Patient Interventions include: Facilitated expression of thoughts and feelings, Explored spiritual coping/struggle/distress and theological reflection, and Affirmed coping skills/support systems  Family/Friends Interventions include: Explored spiritual coping/struggle/distress and theological reflection    Patient Plan of Care: Spiritual Care available upon further referral  Family/Friends Plan of Care: Spiritual Care available upon further referral    Electronically signed by Chaplain Catherine on 8/15/2024 at 11:36 AM

## 2024-08-15 NOTE — PROGRESS NOTES
6Fr right femoral arterial sheath pulled at this time per Wolfgang ZAMORA, manual pressure applied.

## 2024-08-16 ENCOUNTER — APPOINTMENT (OUTPATIENT)
Dept: GENERAL RADIOLOGY | Age: 69
DRG: 233 | End: 2024-08-16
Attending: INTERNAL MEDICINE
Payer: COMMERCIAL

## 2024-08-16 ENCOUNTER — ANESTHESIA (OUTPATIENT)
Dept: OPERATING ROOM | Age: 69
End: 2024-08-16
Payer: COMMERCIAL

## 2024-08-16 LAB
ALBUMIN SERPL-MCNC: 3.9 GM/DL (ref 3.4–5)
ALP BLD-CCNC: 89 IU/L (ref 40–128)
ALT SERPL-CCNC: 18 U/L (ref 10–40)
ANION GAP SERPL CALCULATED.3IONS-SCNC: 12 MMOL/L (ref 7–16)
ANION GAP SERPL CALCULATED.3IONS-SCNC: 7 MMOL/L (ref 7–16)
APTT: 35 SECONDS (ref 25.1–37.1)
AST SERPL-CCNC: 21 IU/L (ref 15–37)
BASE EXCESS MIXED: 0 (ref 0–3)
BASE EXCESS MIXED: 0.2 (ref 0–3)
BASE EXCESS MIXED: 0.3 (ref 0–3)
BASE EXCESS MIXED: 0.5 (ref 0–3)
BASE EXCESS MIXED: 0.6 (ref 0–3)
BASE EXCESS MIXED: 0.6 (ref 0–3)
BASE EXCESS MIXED: 0.7 (ref 0–3)
BASE EXCESS MIXED: 0.7 (ref 0–3)
BASE EXCESS MIXED: 0.9 (ref 0–3)
BASE EXCESS MIXED: 1.5 (ref 0–3)
BASE EXCESS MIXED: 2.3 (ref 0–3)
BASE EXCESS MIXED: 2.8 (ref 0–3)
BASE EXCESS MIXED: 3 (ref 0–3)
BASE EXCESS MIXED: 3.3 (ref 0–3)
BASE EXCESS MIXED: 5.8 (ref 0–3)
BASE EXCESS: ABNORMAL (ref 0–3)
BASOPHILS ABSOLUTE: 0 K/CU MM
BASOPHILS RELATIVE PERCENT: 0.1 % (ref 0–1)
BILIRUB SERPL-MCNC: 0.6 MG/DL (ref 0–1)
BUN SERPL-MCNC: 16 MG/DL (ref 6–23)
BUN SERPL-MCNC: 19 MG/DL (ref 6–23)
CALCIUM IONIZED: ABNORMAL MMOL/L (ref 1.12–1.32)
CALCIUM IONIZED: NORMAL MMOL/L (ref 1.12–1.32)
CALCIUM SERPL-MCNC: 7.9 MG/DL (ref 8.3–10.6)
CALCIUM SERPL-MCNC: 8.9 MG/DL (ref 8.3–10.6)
CHLORIDE BLD-SCNC: 107 MMOL/L (ref 99–110)
CHLORIDE BLD-SCNC: 114 MMOL/L (ref 99–110)
CO2 CONTENT: 22.5 MMOL/L (ref 21–32)
CO2 CONTENT: 27.4 MMOL/L (ref 21–32)
CO2 CONTENT: 27.8 MMOL/L (ref 21–32)
CO2 CONTENT: 27.9 MMOL/L (ref 21–32)
CO2 CONTENT: 29.1 MMOL/L (ref 21–32)
CO2: 21 MMOL/L (ref 21–32)
CO2: 22 MMOL/L (ref 21–32)
CO2: 23 MMOL/L (ref 21–32)
CO2: 24 MMOL/L (ref 21–32)
CO2: 25 MMOL/L (ref 21–32)
CO2: 27 MMOL/L (ref 21–32)
CO2: 29 MMOL/L (ref 21–32)
CREAT SERPL-MCNC: 0.9 MG/DL (ref 0.9–1.3)
CREAT SERPL-MCNC: 0.9 MG/DL (ref 0.9–1.3)
DIFFERENTIAL TYPE: ABNORMAL
EGFR, POC: 81 ML/MIN/1.73M2
EGFR, POC: >90 ML/MIN/1.73M2
EGFR, POC: ABNORMAL ML/MIN/1.73M2
EOSINOPHILS ABSOLUTE: 0.2 K/CU MM
EOSINOPHILS RELATIVE PERCENT: 2.1 % (ref 0–3)
FIBRINOGEN: 273 MG/DL (ref 170–540)
GFR, ESTIMATED: >90 ML/MIN/1.73M2
GFR, ESTIMATED: >90 ML/MIN/1.73M2
GLUCOSE BLD-MCNC: 103 MG/DL (ref 70–99)
GLUCOSE BLD-MCNC: 104 MG/DL (ref 70–99)
GLUCOSE BLD-MCNC: 105 MG/DL (ref 70–99)
GLUCOSE BLD-MCNC: 108 MG/DL (ref 70–99)
GLUCOSE BLD-MCNC: 114 MG/DL (ref 70–99)
GLUCOSE BLD-MCNC: 117 MG/DL (ref 70–99)
GLUCOSE BLD-MCNC: 123 MG/DL (ref 70–99)
GLUCOSE BLD-MCNC: 127 MG/DL (ref 70–99)
GLUCOSE BLD-MCNC: 133 MG/DL (ref 70–99)
GLUCOSE BLD-MCNC: 135 MG/DL (ref 70–99)
GLUCOSE BLD-MCNC: 139 MG/DL (ref 70–99)
GLUCOSE BLD-MCNC: 139 MG/DL (ref 70–99)
GLUCOSE BLD-MCNC: 144 MG/DL (ref 70–99)
GLUCOSE BLD-MCNC: 145 MG/DL (ref 70–99)
GLUCOSE BLD-MCNC: 152 MG/DL (ref 70–99)
GLUCOSE BLD-MCNC: 153 MG/DL (ref 70–99)
GLUCOSE BLD-MCNC: 160 MG/DL (ref 70–99)
GLUCOSE BLD-MCNC: 169 MG/DL (ref 70–99)
GLUCOSE BLD-MCNC: 175 MG/DL (ref 70–99)
GLUCOSE SERPL-MCNC: 108 MG/DL (ref 70–99)
GLUCOSE SERPL-MCNC: 140 MG/DL (ref 70–99)
HCO3 ARTERIAL: 20.5 MMOL/L (ref 21–28)
HCO3 ARTERIAL: 21.5 MMOL/L (ref 21–28)
HCO3 ARTERIAL: 22.2 MMOL/L (ref 21–28)
HCO3 ARTERIAL: 23.3 MMOL/L (ref 21–28)
HCO3 ARTERIAL: 23.3 MMOL/L (ref 21–28)
HCO3 ARTERIAL: 23.6 MMOL/L (ref 21–28)
HCO3 ARTERIAL: 23.6 MMOL/L (ref 21–28)
HCO3 ARTERIAL: 23.8 MMOL/L (ref 21–28)
HCO3 ARTERIAL: 23.8 MMOL/L (ref 21–28)
HCO3 ARTERIAL: 25.1 MMOL/L (ref 21–28)
HCO3 ARTERIAL: 25.8 MMOL/L (ref 21–28)
HCO3 ARTERIAL: 26.1 MMOL/L (ref 21–28)
HCO3 ARTERIAL: 26.4 MMOL/L (ref 21–28)
HCO3 ARTERIAL: 26.9 MMOL/L (ref 21–28)
HCO3 ARTERIAL: 27.6 MMOL/L (ref 21–28)
HCT VFR BLD CALC: 31 % (ref 38–51)
HCT VFR BLD CALC: 32 % (ref 38–51)
HCT VFR BLD CALC: 33 % (ref 38–51)
HCT VFR BLD CALC: 33 % (ref 38–51)
HCT VFR BLD CALC: 34 % (ref 38–51)
HCT VFR BLD CALC: 34 % (ref 42–52)
HCT VFR BLD CALC: 35 % (ref 38–51)
HCT VFR BLD CALC: 35.9 % (ref 42–52)
HCT VFR BLD CALC: 36.9 % (ref 42–52)
HCT VFR BLD CALC: 40 % (ref 38–51)
HCT VFR BLD CALC: 40.6 % (ref 42–52)
HCT VFR BLD CALC: 42 % (ref 38–51)
HEMOGLOBIN: 10.6 GM/DL (ref 12–17)
HEMOGLOBIN: 10.9 GM/DL (ref 12–17)
HEMOGLOBIN: 11.1 GM/DL (ref 12–17)
HEMOGLOBIN: 11.2 GM/DL (ref 12–17)
HEMOGLOBIN: 11.4 GM/DL (ref 12–17)
HEMOGLOBIN: 11.5 GM/DL (ref 12–17)
HEMOGLOBIN: 11.5 GM/DL (ref 12–17)
HEMOGLOBIN: 11.6 GM/DL (ref 12–17)
HEMOGLOBIN: 11.6 GM/DL (ref 12–17)
HEMOGLOBIN: 11.6 GM/DL (ref 13.5–18)
HEMOGLOBIN: 11.7 GM/DL (ref 12–17)
HEMOGLOBIN: 11.7 GM/DL (ref 12–17)
HEMOGLOBIN: 11.9 GM/DL (ref 12–17)
HEMOGLOBIN: 11.9 GM/DL (ref 13.5–18)
HEMOGLOBIN: 12.3 GM/DL (ref 13.5–18)
HEMOGLOBIN: 13.5 GM/DL (ref 13.5–18)
HEMOGLOBIN: 13.6 GM/DL (ref 12–17)
HEMOGLOBIN: 14.1 GM/DL (ref 12–17)
IMMATURE NEUTROPHIL %: 0.2 % (ref 0–0.43)
INR BLD: 1.4 INDEX
LYMPHOCYTES ABSOLUTE: 1.8 K/CU MM
LYMPHOCYTES RELATIVE PERCENT: 22.1 % (ref 24–44)
MAGNESIUM: 2.9 MG/DL (ref 1.8–2.4)
MCH RBC QN AUTO: 28.7 PG (ref 27–31)
MCH RBC QN AUTO: 28.8 PG (ref 27–31)
MCH RBC QN AUTO: 29.1 PG (ref 27–31)
MCHC RBC AUTO-ENTMCNC: 33.1 % (ref 32–36)
MCHC RBC AUTO-ENTMCNC: 33.3 % (ref 32–36)
MCHC RBC AUTO-ENTMCNC: 33.3 % (ref 32–36)
MCV RBC AUTO: 86.7 FL (ref 78–100)
MCV RBC AUTO: 86.8 FL (ref 78–100)
MCV RBC AUTO: 87.4 FL (ref 78–100)
MONOCYTES ABSOLUTE: 0.7 K/CU MM
MONOCYTES RELATIVE PERCENT: 8.8 % (ref 0–4)
NEUTROPHILS ABSOLUTE: 5.4 K/CU MM
NEUTROPHILS RELATIVE PERCENT: 66.7 % (ref 36–66)
NUCLEATED RBC %: 0 %
O2 SATURATION: 92.6 % (ref 94–98)
O2 SATURATION: 92.8 % (ref 94–98)
O2 SATURATION: 94.2 % (ref 94–98)
O2 SATURATION: 94.4 % (ref 94–98)
O2 SATURATION: 94.6 % (ref 94–98)
O2 SATURATION: 95.2 % (ref 94–98)
O2 SATURATION: 95.2 % (ref 94–98)
O2 SATURATION: 95.3 % (ref 94–98)
O2 SATURATION: 96.5 % (ref 94–98)
O2 SATURATION: 97.9 % (ref 94–98)
O2 SATURATION: 98.2 % (ref 94–98)
O2 SATURATION: 99.9 % (ref 94–98)
PCO2 ARTERIAL: 34.7 MMHG (ref 35–48)
PCO2 ARTERIAL: 34.8 MMHG (ref 35–48)
PCO2 ARTERIAL: 35.3 MMHG (ref 35–48)
PCO2 ARTERIAL: 35.4 MMHG (ref 35–48)
PCO2 ARTERIAL: 36.2 MMHG (ref 35–48)
PCO2 ARTERIAL: 36.7 MMHG (ref 35–48)
PCO2 ARTERIAL: 37.4 MMHG (ref 35–48)
PCO2 ARTERIAL: 37.5 MMHG (ref 35–48)
PCO2 ARTERIAL: 42.6 MMHG (ref 35–48)
PCO2 ARTERIAL: 44.4 MMHG (ref 35–48)
PCO2 ARTERIAL: 48 MMHG (ref 35–48)
PCO2 ARTERIAL: 49.3 MMHG (ref 35–48)
PCO2 ARTERIAL: 51.8 MMHG (ref 35–48)
PCO2 ARTERIAL: 58.8 MMHG (ref 35–48)
PCO2 ARTERIAL: 64.4 MMHG (ref 35–48)
PDW BLD-RTO: 13.2 % (ref 11.7–14.9)
PH BLOOD: 7.21 (ref 7.35–7.45)
PH BLOOD: 7.24 (ref 7.35–7.45)
PH BLOOD: 7.29 (ref 7.35–7.45)
PH BLOOD: 7.32 (ref 7.35–7.45)
PH BLOOD: 7.35 (ref 7.35–7.45)
PH BLOOD: 7.36 (ref 7.35–7.45)
PH BLOOD: 7.38 (ref 7.35–7.45)
PH BLOOD: 7.39 (ref 7.35–7.45)
PH BLOOD: 7.4 (ref 7.35–7.45)
PH BLOOD: 7.41 (ref 7.35–7.45)
PH BLOOD: 7.41 (ref 7.35–7.45)
PH BLOOD: 7.42 (ref 7.35–7.45)
PH BLOOD: 7.43 (ref 7.35–7.45)
PH BLOOD: 7.43 (ref 7.35–7.45)
PH BLOOD: 7.44 (ref 7.35–7.45)
PHOSPHORUS: 1.9 MG/DL (ref 2.5–4.9)
PHOSPHORUS: 2.8 MG/DL (ref 2.5–4.9)
PLATELET # BLD: 160 K/CU MM (ref 140–440)
PLATELET # BLD: 204 K/CU MM (ref 140–440)
PLATELET # BLD: 243 K/CU MM (ref 140–440)
PMV BLD AUTO: 10 FL (ref 7.5–11.1)
PMV BLD AUTO: 9.8 FL (ref 7.5–11.1)
PMV BLD AUTO: 9.9 FL (ref 7.5–11.1)
PO2 ARTERIAL: 100.6 MMHG (ref 83–108)
PO2 ARTERIAL: 130.2 MMHG (ref 83–108)
PO2 ARTERIAL: 279.5 MMHG (ref 83–108)
PO2 ARTERIAL: 310.2 MMHG (ref 83–108)
PO2 ARTERIAL: 358 MMHG (ref 83–108)
PO2 ARTERIAL: 63.3 MMHG (ref 83–108)
PO2 ARTERIAL: 65 MMHG (ref 83–108)
PO2 ARTERIAL: 69.6 MMHG (ref 83–108)
PO2 ARTERIAL: 70.8 MMHG (ref 83–108)
PO2 ARTERIAL: 76.4 MMHG (ref 83–108)
PO2 ARTERIAL: 76.8 MMHG (ref 83–108)
PO2 ARTERIAL: 81.7 MMHG (ref 83–108)
PO2 ARTERIAL: 86.5 MMHG (ref 83–108)
PO2 ARTERIAL: 90.8 MMHG (ref 83–108)
POC ACT PLUS: 100 SEC
POC ACT PLUS: 112 SEC
POC ACT PLUS: 114 SEC
POC ACT PLUS: 482 SEC
POC ACT PLUS: 508 SEC
POC ACT PLUS: 516 SEC
POC ACT PLUS: 566 SEC
POC CALCIUM: 1.11 MMOL/L (ref 1.15–1.33)
POC CALCIUM: 1.12 MMOL/L (ref 1.15–1.33)
POC CALCIUM: 1.12 MMOL/L (ref 1.15–1.33)
POC CALCIUM: 1.14 MMOL/L (ref 1.15–1.33)
POC CALCIUM: 1.15 MMOL/L (ref 1.15–1.33)
POC CALCIUM: 1.16 MMOL/L (ref 1.15–1.33)
POC CALCIUM: 1.18 MMOL/L (ref 1.15–1.33)
POC CALCIUM: 1.19 MMOL/L (ref 1.15–1.33)
POC CALCIUM: 1.26 MMOL/L (ref 1.15–1.33)
POC CALCIUM: 1.27 MMOL/L (ref 1.15–1.33)
POC CALCIUM: 1.38 MMOL/L (ref 1.15–1.33)
POC CHLORIDE: 108 MMOL/L (ref 99–110)
POC CHLORIDE: 112 MMOL/L (ref 99–110)
POC CHLORIDE: 113 MMOL/L (ref 99–110)
POC CHLORIDE: 113 MMOL/L (ref 99–110)
POC CHLORIDE: 114 MMOL/L (ref 99–110)
POC CREATININE: 0.7 MG/DL (ref 0.5–1.2)
POC CREATININE: 0.8 MG/DL (ref 0.5–1.2)
POC CREATININE: 0.9 MG/DL (ref 0.5–1.2)
POC CREATININE: 1 MG/DL (ref 0.5–1.2)
POTASSIUM SERPL-SCNC: 3.4 MMOL/L (ref 3.5–5.1)
POTASSIUM SERPL-SCNC: 3.5 MMOL/L (ref 3.5–5.1)
POTASSIUM SERPL-SCNC: 3.5 MMOL/L (ref 3.5–5.1)
POTASSIUM SERPL-SCNC: 3.6 MMOL/L (ref 3.5–5.1)
POTASSIUM SERPL-SCNC: 3.6 MMOL/L (ref 3.5–5.1)
POTASSIUM SERPL-SCNC: 3.7 MMOL/L (ref 3.5–5.1)
POTASSIUM SERPL-SCNC: 3.8 MMOL/L (ref 3.5–5.1)
POTASSIUM SERPL-SCNC: 3.9 MMOL/L (ref 3.5–5.1)
POTASSIUM SERPL-SCNC: 4.1 MMOL/L (ref 3.5–5.1)
POTASSIUM SERPL-SCNC: 4.2 MMOL/L (ref 3.5–5.1)
POTASSIUM SERPL-SCNC: 4.3 MMOL/L (ref 3.5–5.1)
POTASSIUM SERPL-SCNC: 4.4 MMOL/L (ref 3.5–5.1)
POTASSIUM SERPL-SCNC: 4.9 MMOL/L (ref 3.5–5.1)
PROTHROMBIN TIME: 17.6 SECONDS (ref 11.7–14.5)
RBC # BLD: 4.14 M/CU MM (ref 4.6–6.2)
RBC # BLD: 4.22 M/CU MM (ref 4.6–6.2)
RBC # BLD: 4.68 M/CU MM (ref 4.6–6.2)
SODIUM BLD-SCNC: 142 MMOL/L (ref 135–145)
SODIUM BLD-SCNC: 143 MMOL/L (ref 135–145)
SODIUM BLD-SCNC: 144 MMOL/L (ref 135–145)
SODIUM BLD-SCNC: 145 MMOL/L (ref 135–145)
SODIUM BLD-SCNC: 146 MMOL/L (ref 135–145)
SODIUM BLD-SCNC: 147 MMOL/L (ref 135–145)
SODIUM BLD-SCNC: 148 MMOL/L (ref 135–145)
SODIUM BLD-SCNC: 148 MMOL/L (ref 135–145)
SODIUM BLD-SCNC: 149 MMOL/L (ref 135–145)
SOURCE, BLOOD GAS: ABNORMAL
TOTAL IMMATURE NEUTOROPHIL: 0.02 K/CU MM
TOTAL NUCLEATED RBC: 0 K/CU MM
TOTAL PROTEIN: 6.8 GM/DL (ref 6.4–8.2)
WBC # BLD: 11.5 K/CU MM (ref 4–10.5)
WBC # BLD: 26.4 K/CU MM (ref 4–10.5)
WBC # BLD: 8.1 K/CU MM (ref 4–10.5)

## 2024-08-16 PROCEDURE — A4217 STERILE WATER/SALINE, 500 ML: HCPCS | Performed by: THORACIC SURGERY (CARDIOTHORACIC VASCULAR SURGERY)

## 2024-08-16 PROCEDURE — 6360000002 HC RX W HCPCS

## 2024-08-16 PROCEDURE — 6370000000 HC RX 637 (ALT 250 FOR IP): Performed by: PHYSICIAN ASSISTANT

## 2024-08-16 PROCEDURE — C1729 CATH, DRAINAGE: HCPCS | Performed by: THORACIC SURGERY (CARDIOTHORACIC VASCULAR SURGERY)

## 2024-08-16 PROCEDURE — 84295 ASSAY OF SERUM SODIUM: CPT

## 2024-08-16 PROCEDURE — 2580000003 HC RX 258: Performed by: THORACIC SURGERY (CARDIOTHORACIC VASCULAR SURGERY)

## 2024-08-16 PROCEDURE — 6360000002 HC RX W HCPCS: Performed by: PHYSICIAN ASSISTANT

## 2024-08-16 PROCEDURE — 82803 BLOOD GASES ANY COMBINATION: CPT

## 2024-08-16 PROCEDURE — 5A1221Z PERFORMANCE OF CARDIAC OUTPUT, CONTINUOUS: ICD-10-PCS | Performed by: THORACIC SURGERY (CARDIOTHORACIC VASCULAR SURGERY)

## 2024-08-16 PROCEDURE — 2500000003 HC RX 250 WO HCPCS: Performed by: THORACIC SURGERY (CARDIOTHORACIC VASCULAR SURGERY)

## 2024-08-16 PROCEDURE — 03HC33Z INSERTION OF INFUSION DEVICE INTO LEFT RADIAL ARTERY, PERCUTANEOUS APPROACH: ICD-10-PCS | Performed by: INTERNAL MEDICINE

## 2024-08-16 PROCEDURE — 6370000000 HC RX 637 (ALT 250 FOR IP): Performed by: INTERNAL MEDICINE

## 2024-08-16 PROCEDURE — 06BQ4ZZ EXCISION OF LEFT SAPHENOUS VEIN, PERCUTANEOUS ENDOSCOPIC APPROACH: ICD-10-PCS | Performed by: THORACIC SURGERY (CARDIOTHORACIC VASCULAR SURGERY)

## 2024-08-16 PROCEDURE — 2500000003 HC RX 250 WO HCPCS: Performed by: PHYSICIAN ASSISTANT

## 2024-08-16 PROCEDURE — 80051 ELECTROLYTE PANEL: CPT

## 2024-08-16 PROCEDURE — 3700000001 HC ADD 15 MINUTES (ANESTHESIA): Performed by: THORACIC SURGERY (CARDIOTHORACIC VASCULAR SURGERY)

## 2024-08-16 PROCEDURE — 94761 N-INVAS EAR/PLS OXIMETRY MLT: CPT

## 2024-08-16 PROCEDURE — 5A0955A ASSISTANCE WITH RESPIRATORY VENTILATION, GREATER THAN 96 CONSECUTIVE HOURS, HIGH NASAL FLOW/VELOCITY: ICD-10-PCS | Performed by: THORACIC SURGERY (CARDIOTHORACIC VASCULAR SURGERY)

## 2024-08-16 PROCEDURE — 02100Z9 BYPASS CORONARY ARTERY, ONE ARTERY FROM LEFT INTERNAL MAMMARY, OPEN APPROACH: ICD-10-PCS | Performed by: THORACIC SURGERY (CARDIOTHORACIC VASCULAR SURGERY)

## 2024-08-16 PROCEDURE — 5A1935Z RESPIRATORY VENTILATION, LESS THAN 24 CONSECUTIVE HOURS: ICD-10-PCS | Performed by: THORACIC SURGERY (CARDIOTHORACIC VASCULAR SURGERY)

## 2024-08-16 PROCEDURE — 33508 ENDOSCOPIC VEIN HARVEST: CPT | Performed by: THORACIC SURGERY (CARDIOTHORACIC VASCULAR SURGERY)

## 2024-08-16 PROCEDURE — 02HV33Z INSERTION OF INFUSION DEVICE INTO SUPERIOR VENA CAVA, PERCUTANEOUS APPROACH: ICD-10-PCS | Performed by: THORACIC SURGERY (CARDIOTHORACIC VASCULAR SURGERY)

## 2024-08-16 PROCEDURE — P9045 ALBUMIN (HUMAN), 5%, 250 ML: HCPCS

## 2024-08-16 PROCEDURE — 83735 ASSAY OF MAGNESIUM: CPT

## 2024-08-16 PROCEDURE — 2700000000 HC OXYGEN THERAPY PER DAY

## 2024-08-16 PROCEDURE — 6370000000 HC RX 637 (ALT 250 FOR IP)

## 2024-08-16 PROCEDURE — C1751 CATH, INF, PER/CENT/MIDLINE: HCPCS | Performed by: THORACIC SURGERY (CARDIOTHORACIC VASCULAR SURGERY)

## 2024-08-16 PROCEDURE — 32551 INSERTION OF CHEST TUBE: CPT

## 2024-08-16 PROCEDURE — 2580000003 HC RX 258

## 2024-08-16 PROCEDURE — 85025 COMPLETE CBC W/AUTO DIFF WBC: CPT

## 2024-08-16 PROCEDURE — 6370000000 HC RX 637 (ALT 250 FOR IP): Performed by: THORACIC SURGERY (CARDIOTHORACIC VASCULAR SURGERY)

## 2024-08-16 PROCEDURE — 2500000003 HC RX 250 WO HCPCS

## 2024-08-16 PROCEDURE — 94002 VENT MGMT INPAT INIT DAY: CPT

## 2024-08-16 PROCEDURE — 3600000008 HC SURGERY OHS BASE: Performed by: THORACIC SURGERY (CARDIOTHORACIC VASCULAR SURGERY)

## 2024-08-16 PROCEDURE — 71045 X-RAY EXAM CHEST 1 VIEW: CPT

## 2024-08-16 PROCEDURE — 2100000000 HC CCU R&B

## 2024-08-16 PROCEDURE — 2720000010 HC SURG SUPPLY STERILE: Performed by: THORACIC SURGERY (CARDIOTHORACIC VASCULAR SURGERY)

## 2024-08-16 PROCEDURE — 94640 AIRWAY INHALATION TREATMENT: CPT

## 2024-08-16 PROCEDURE — 82962 GLUCOSE BLOOD TEST: CPT

## 2024-08-16 PROCEDURE — 0BH17EZ INSERTION OF ENDOTRACHEAL AIRWAY INTO TRACHEA, VIA NATURAL OR ARTIFICIAL OPENING: ICD-10-PCS | Performed by: THORACIC SURGERY (CARDIOTHORACIC VASCULAR SURGERY)

## 2024-08-16 PROCEDURE — 3600000018 HC SURGERY OHS ADDTL 15MIN: Performed by: THORACIC SURGERY (CARDIOTHORACIC VASCULAR SURGERY)

## 2024-08-16 PROCEDURE — 36620 INSERTION CATHETER ARTERY: CPT

## 2024-08-16 PROCEDURE — 84100 ASSAY OF PHOSPHORUS: CPT

## 2024-08-16 PROCEDURE — 82330 ASSAY OF CALCIUM: CPT

## 2024-08-16 PROCEDURE — 85384 FIBRINOGEN ACTIVITY: CPT

## 2024-08-16 PROCEDURE — 84132 ASSAY OF SERUM POTASSIUM: CPT

## 2024-08-16 PROCEDURE — A4648 IMPLANTABLE TISSUE MARKER: HCPCS | Performed by: THORACIC SURGERY (CARDIOTHORACIC VASCULAR SURGERY)

## 2024-08-16 PROCEDURE — 85347 COAGULATION TIME ACTIVATED: CPT

## 2024-08-16 PROCEDURE — 3700000000 HC ANESTHESIA ATTENDED CARE: Performed by: THORACIC SURGERY (CARDIOTHORACIC VASCULAR SURGERY)

## 2024-08-16 PROCEDURE — 33518 CABG ARTERY-VEIN TWO: CPT | Performed by: THORACIC SURGERY (CARDIOTHORACIC VASCULAR SURGERY)

## 2024-08-16 PROCEDURE — 6360000002 HC RX W HCPCS: Performed by: THORACIC SURGERY (CARDIOTHORACIC VASCULAR SURGERY)

## 2024-08-16 PROCEDURE — 2580000003 HC RX 258: Performed by: PHYSICIAN ASSISTANT

## 2024-08-16 PROCEDURE — 2709999900 HC NON-CHARGEABLE SUPPLY: Performed by: THORACIC SURGERY (CARDIOTHORACIC VASCULAR SURGERY)

## 2024-08-16 PROCEDURE — C1768 GRAFT, VASCULAR: HCPCS | Performed by: THORACIC SURGERY (CARDIOTHORACIC VASCULAR SURGERY)

## 2024-08-16 PROCEDURE — P9045 ALBUMIN (HUMAN), 5%, 250 ML: HCPCS | Performed by: PHYSICIAN ASSISTANT

## 2024-08-16 PROCEDURE — 80053 COMPREHEN METABOLIC PANEL: CPT

## 2024-08-16 PROCEDURE — 85027 COMPLETE CBC AUTOMATED: CPT

## 2024-08-16 PROCEDURE — 021109W BYPASS CORONARY ARTERY, TWO ARTERIES FROM AORTA WITH AUTOLOGOUS VENOUS TISSUE, OPEN APPROACH: ICD-10-PCS | Performed by: THORACIC SURGERY (CARDIOTHORACIC VASCULAR SURGERY)

## 2024-08-16 PROCEDURE — 85014 HEMATOCRIT: CPT

## 2024-08-16 PROCEDURE — 82565 ASSAY OF CREATININE: CPT

## 2024-08-16 PROCEDURE — 33533 CABG ARTERIAL SINGLE: CPT | Performed by: THORACIC SURGERY (CARDIOTHORACIC VASCULAR SURGERY)

## 2024-08-16 PROCEDURE — P9016 RBC LEUKOCYTES REDUCED: HCPCS

## 2024-08-16 PROCEDURE — 85610 PROTHROMBIN TIME: CPT

## 2024-08-16 PROCEDURE — 80048 BASIC METABOLIC PNL TOTAL CA: CPT

## 2024-08-16 PROCEDURE — 85018 HEMOGLOBIN: CPT

## 2024-08-16 PROCEDURE — 85730 THROMBOPLASTIN TIME PARTIAL: CPT

## 2024-08-16 PROCEDURE — P9047 ALBUMIN (HUMAN), 25%, 50ML: HCPCS

## 2024-08-16 RX ORDER — SODIUM CHLORIDE 9 MG/ML
INJECTION, SOLUTION INTRAVENOUS CONTINUOUS
Status: DISCONTINUED | OUTPATIENT
Start: 2024-08-16 | End: 2024-08-19

## 2024-08-16 RX ORDER — LIDOCAINE HYDROCHLORIDE 20 MG/ML
INJECTION, SOLUTION EPIDURAL; INFILTRATION; INTRACAUDAL; PERINEURAL PRN
Status: DISCONTINUED | OUTPATIENT
Start: 2024-08-16 | End: 2024-08-16 | Stop reason: SDUPTHER

## 2024-08-16 RX ORDER — ROCURONIUM BROMIDE 10 MG/ML
INJECTION, SOLUTION INTRAVENOUS PRN
Status: DISCONTINUED | OUTPATIENT
Start: 2024-08-16 | End: 2024-08-16 | Stop reason: SDUPTHER

## 2024-08-16 RX ORDER — HYDRALAZINE HYDROCHLORIDE 20 MG/ML
20 INJECTION INTRAMUSCULAR; INTRAVENOUS EVERY 6 HOURS PRN
Status: DISCONTINUED | OUTPATIENT
Start: 2024-08-19 | End: 2024-08-23 | Stop reason: HOSPADM

## 2024-08-16 RX ORDER — PROTAMINE SULFATE 10 MG/ML
INJECTION, SOLUTION INTRAVENOUS PRN
Status: DISCONTINUED | OUTPATIENT
Start: 2024-08-16 | End: 2024-08-16 | Stop reason: HOSPADM

## 2024-08-16 RX ORDER — BISACODYL 10 MG
10 SUPPOSITORY, RECTAL RECTAL DAILY PRN
Status: DISCONTINUED | OUTPATIENT
Start: 2024-08-16 | End: 2024-08-23 | Stop reason: HOSPADM

## 2024-08-16 RX ORDER — DEXTROSE MONOHYDRATE 100 MG/ML
INJECTION, SOLUTION INTRAVENOUS CONTINUOUS PRN
Status: DISCONTINUED | OUTPATIENT
Start: 2024-08-16 | End: 2024-08-23 | Stop reason: HOSPADM

## 2024-08-16 RX ORDER — ONDANSETRON 4 MG/1
4 TABLET, ORALLY DISINTEGRATING ORAL EVERY 8 HOURS PRN
Status: DISCONTINUED | OUTPATIENT
Start: 2024-08-16 | End: 2024-08-23 | Stop reason: HOSPADM

## 2024-08-16 RX ORDER — MUPIROCIN 20 MG/G
OINTMENT TOPICAL 2 TIMES DAILY
Status: COMPLETED | OUTPATIENT
Start: 2024-08-16 | End: 2024-08-20

## 2024-08-16 RX ORDER — SODIUM CHLORIDE 0.9 % (FLUSH) 0.9 %
5-40 SYRINGE (ML) INJECTION EVERY 12 HOURS SCHEDULED
Status: DISCONTINUED | OUTPATIENT
Start: 2024-08-16 | End: 2024-08-19 | Stop reason: SDUPTHER

## 2024-08-16 RX ORDER — FENTANYL CITRATE 50 UG/ML
25 INJECTION, SOLUTION INTRAMUSCULAR; INTRAVENOUS
Status: DISPENSED | OUTPATIENT
Start: 2024-08-16 | End: 2024-08-17

## 2024-08-16 RX ORDER — PROPOFOL 10 MG/ML
5-50 INJECTION, EMULSION INTRAVENOUS CONTINUOUS
Status: DISCONTINUED | OUTPATIENT
Start: 2024-08-16 | End: 2024-08-17 | Stop reason: ALTCHOICE

## 2024-08-16 RX ORDER — SODIUM CHLORIDE 0.9 % (FLUSH) 0.9 %
5-40 SYRINGE (ML) INJECTION PRN
Status: DISCONTINUED | OUTPATIENT
Start: 2024-08-16 | End: 2024-08-19 | Stop reason: SDUPTHER

## 2024-08-16 RX ORDER — ASPIRIN 81 MG/1
81 TABLET, CHEWABLE ORAL DAILY
Status: DISCONTINUED | OUTPATIENT
Start: 2024-08-17 | End: 2024-08-23 | Stop reason: HOSPADM

## 2024-08-16 RX ORDER — FENTANYL CITRATE 50 UG/ML
25 INJECTION, SOLUTION INTRAMUSCULAR; INTRAVENOUS
Status: ACTIVE | OUTPATIENT
Start: 2024-08-16 | End: 2024-08-17

## 2024-08-16 RX ORDER — HEPARIN SODIUM 1000 [USP'U]/ML
INJECTION, SOLUTION INTRAVENOUS; SUBCUTANEOUS PRN
Status: DISCONTINUED | OUTPATIENT
Start: 2024-08-16 | End: 2024-08-16 | Stop reason: SDUPTHER

## 2024-08-16 RX ORDER — FENTANYL CITRATE 0.05 MG/ML
INJECTION, SOLUTION INTRAMUSCULAR; INTRAVENOUS PRN
Status: DISCONTINUED | OUTPATIENT
Start: 2024-08-16 | End: 2024-08-16 | Stop reason: HOSPADM

## 2024-08-16 RX ORDER — TRAMADOL HYDROCHLORIDE 50 MG/1
50 TABLET ORAL EVERY 6 HOURS PRN
Status: DISCONTINUED | OUTPATIENT
Start: 2024-08-16 | End: 2024-08-23 | Stop reason: HOSPADM

## 2024-08-16 RX ORDER — METOPROLOL TARTRATE 25 MG/1
25 TABLET, FILM COATED ORAL ONCE
Status: COMPLETED | OUTPATIENT
Start: 2024-08-16 | End: 2024-08-16

## 2024-08-16 RX ORDER — GABAPENTIN 300 MG/1
300 CAPSULE ORAL 3 TIMES DAILY
Status: DISCONTINUED | OUTPATIENT
Start: 2024-08-17 | End: 2024-08-23 | Stop reason: HOSPADM

## 2024-08-16 RX ORDER — FAMOTIDINE 20 MG/1
20 TABLET, FILM COATED ORAL 2 TIMES DAILY
Status: DISCONTINUED | OUTPATIENT
Start: 2024-08-16 | End: 2024-08-23 | Stop reason: HOSPADM

## 2024-08-16 RX ORDER — SENNA AND DOCUSATE SODIUM 50; 8.6 MG/1; MG/1
1 TABLET, FILM COATED ORAL 2 TIMES DAILY
Status: DISCONTINUED | OUTPATIENT
Start: 2024-08-16 | End: 2024-08-23 | Stop reason: HOSPADM

## 2024-08-16 RX ORDER — PROTAMINE SULFATE 10 MG/ML
50 INJECTION, SOLUTION INTRAVENOUS
Status: ACTIVE | OUTPATIENT
Start: 2024-08-16 | End: 2024-08-17

## 2024-08-16 RX ORDER — GINSENG 100 MG
CAPSULE ORAL 2 TIMES DAILY
Status: DISCONTINUED | OUTPATIENT
Start: 2024-08-18 | End: 2024-08-23 | Stop reason: HOSPADM

## 2024-08-16 RX ORDER — ATORVASTATIN CALCIUM 40 MG/1
40 TABLET, FILM COATED ORAL NIGHTLY
Status: DISCONTINUED | OUTPATIENT
Start: 2024-08-17 | End: 2024-08-23 | Stop reason: HOSPADM

## 2024-08-16 RX ORDER — ONDANSETRON 2 MG/ML
4 INJECTION INTRAMUSCULAR; INTRAVENOUS EVERY 6 HOURS PRN
Status: DISCONTINUED | OUTPATIENT
Start: 2024-08-16 | End: 2024-08-23 | Stop reason: HOSPADM

## 2024-08-16 RX ORDER — FAMOTIDINE 10 MG/ML
20 INJECTION, SOLUTION INTRAVENOUS 2 TIMES DAILY
Status: DISCONTINUED | OUTPATIENT
Start: 2024-08-16 | End: 2024-08-23 | Stop reason: HOSPADM

## 2024-08-16 RX ORDER — CHLORHEXIDINE GLUCONATE ORAL RINSE 1.2 MG/ML
15 SOLUTION DENTAL 2 TIMES DAILY
Status: DISCONTINUED | OUTPATIENT
Start: 2024-08-16 | End: 2024-08-19

## 2024-08-16 RX ORDER — CLOPIDOGREL BISULFATE 75 MG/1
75 TABLET ORAL DAILY
Status: DISCONTINUED | OUTPATIENT
Start: 2024-08-18 | End: 2024-08-23 | Stop reason: HOSPADM

## 2024-08-16 RX ORDER — DEXMEDETOMIDINE HYDROCHLORIDE 4 UG/ML
.1-1.5 INJECTION, SOLUTION INTRAVENOUS CONTINUOUS
Status: DISCONTINUED | OUTPATIENT
Start: 2024-08-16 | End: 2024-08-17 | Stop reason: ALTCHOICE

## 2024-08-16 RX ORDER — ALBUMIN, HUMAN INJ 5% 5 %
12.5 SOLUTION INTRAVENOUS PRN
Status: DISCONTINUED | OUTPATIENT
Start: 2024-08-16 | End: 2024-08-23 | Stop reason: HOSPADM

## 2024-08-16 RX ORDER — SODIUM CHLORIDE 9 MG/ML
INJECTION, SOLUTION INTRAVENOUS PRN
Status: DISCONTINUED | OUTPATIENT
Start: 2024-08-16 | End: 2024-08-23 | Stop reason: HOSPADM

## 2024-08-16 RX ORDER — POLYETHYLENE GLYCOL 3350 17 G/17G
17 POWDER, FOR SOLUTION ORAL DAILY
Status: DISCONTINUED | OUTPATIENT
Start: 2024-08-16 | End: 2024-08-23 | Stop reason: HOSPADM

## 2024-08-16 RX ORDER — M-VIT,TX,IRON,MINS/CALC/FOLIC 27MG-0.4MG
1 TABLET ORAL
Status: DISCONTINUED | OUTPATIENT
Start: 2024-08-17 | End: 2024-08-23 | Stop reason: HOSPADM

## 2024-08-16 RX ORDER — ACETAMINOPHEN 500 MG
1000 TABLET ORAL EVERY 6 HOURS SCHEDULED
Status: DISCONTINUED | OUTPATIENT
Start: 2024-08-16 | End: 2024-08-23 | Stop reason: HOSPADM

## 2024-08-16 RX ORDER — PROPOFOL 10 MG/ML
INJECTION, EMULSION INTRAVENOUS PRN
Status: DISCONTINUED | OUTPATIENT
Start: 2024-08-16 | End: 2024-08-16 | Stop reason: SDUPTHER

## 2024-08-16 RX ORDER — IPRATROPIUM BROMIDE AND ALBUTEROL SULFATE 2.5; .5 MG/3ML; MG/3ML
1 SOLUTION RESPIRATORY (INHALATION)
Status: DISCONTINUED | OUTPATIENT
Start: 2024-08-16 | End: 2024-08-18

## 2024-08-16 RX ORDER — ALBUMIN, HUMAN INJ 5% 5 %
SOLUTION INTRAVENOUS PRN
Status: DISCONTINUED | OUTPATIENT
Start: 2024-08-16 | End: 2024-08-16 | Stop reason: HOSPADM

## 2024-08-16 RX ORDER — POTASSIUM CHLORIDE 29.8 MG/ML
20 INJECTION INTRAVENOUS PRN
Status: DISCONTINUED | OUTPATIENT
Start: 2024-08-16 | End: 2024-08-23 | Stop reason: HOSPADM

## 2024-08-16 RX ORDER — HEPARIN SODIUM 5000 [USP'U]/ML
5000 INJECTION, SOLUTION INTRAVENOUS; SUBCUTANEOUS EVERY 8 HOURS SCHEDULED
Status: DISCONTINUED | OUTPATIENT
Start: 2024-08-17 | End: 2024-08-23 | Stop reason: HOSPADM

## 2024-08-16 RX ORDER — GLUCAGON 1 MG/ML
1 KIT INJECTION PRN
Status: DISCONTINUED | OUTPATIENT
Start: 2024-08-16 | End: 2024-08-23 | Stop reason: HOSPADM

## 2024-08-16 RX ADMIN — EPINEPHRINE 2 MCG/MIN: 1 INJECTION INTRAMUSCULAR; INTRAVENOUS; SUBCUTANEOUS at 11:12

## 2024-08-16 RX ADMIN — SODIUM CHLORIDE 3000 MG: 900 INJECTION INTRAVENOUS at 08:22

## 2024-08-16 RX ADMIN — ROCURONIUM BROMIDE 100 MG: 10 INJECTION, SOLUTION INTRAVENOUS at 07:54

## 2024-08-16 RX ADMIN — SODIUM CHLORIDE 3000 MG: 900 INJECTION INTRAVENOUS at 23:29

## 2024-08-16 RX ADMIN — CHLORHEXIDINE GLUCONATE 0.12% ORAL RINSE 15 ML: 1.2 LIQUID ORAL at 20:59

## 2024-08-16 RX ADMIN — SUGAMMADEX 200 MG: 100 INJECTION, SOLUTION INTRAVENOUS at 12:48

## 2024-08-16 RX ADMIN — SODIUM CHLORIDE 3000 MG: 900 INJECTION INTRAVENOUS at 14:49

## 2024-08-16 RX ADMIN — ALBUMIN (HUMAN) 12.5 G: 12.5 INJECTION, SOLUTION INTRAVENOUS at 13:45

## 2024-08-16 RX ADMIN — MUPIROCIN: 20 OINTMENT TOPICAL at 20:59

## 2024-08-16 RX ADMIN — TRAMADOL HYDROCHLORIDE 50 MG: 50 TABLET ORAL at 20:57

## 2024-08-16 RX ADMIN — ROCURONIUM BROMIDE 20 MG: 10 INJECTION, SOLUTION INTRAVENOUS at 10:36

## 2024-08-16 RX ADMIN — POTASSIUM CHLORIDE 20 MEQ: 29.8 INJECTION, SOLUTION INTRAVENOUS at 15:30

## 2024-08-16 RX ADMIN — SODIUM CHLORIDE: 9 INJECTION, SOLUTION INTRAVENOUS at 12:58

## 2024-08-16 RX ADMIN — FAMOTIDINE 20 MG: 10 INJECTION, SOLUTION INTRAVENOUS at 13:08

## 2024-08-16 RX ADMIN — FENTANYL CITRATE 250 MCG: 50 INJECTION, SOLUTION INTRAMUSCULAR; INTRAVENOUS at 07:54

## 2024-08-16 RX ADMIN — FENTANYL CITRATE 25 MCG: 50 INJECTION, SOLUTION INTRAMUSCULAR; INTRAVENOUS at 18:09

## 2024-08-16 RX ADMIN — ALBUMIN (HUMAN) 12.5 G: 12.5 INJECTION, SOLUTION INTRAVENOUS at 13:30

## 2024-08-16 RX ADMIN — SODIUM CHLORIDE 4 UNITS/HR: 9 INJECTION, SOLUTION INTRAVENOUS at 09:31

## 2024-08-16 RX ADMIN — AMINOCAPROIC ACID 5 G/HR: 250 INJECTION, SOLUTION INTRAVENOUS at 08:35

## 2024-08-16 RX ADMIN — DEXMEDETOMIDINE HYDROCHLORIDE 0.4 MCG/KG/HR: 4 INJECTION, SOLUTION INTRAVENOUS at 08:50

## 2024-08-16 RX ADMIN — FENTANYL CITRATE 150 MCG: 50 INJECTION, SOLUTION INTRAMUSCULAR; INTRAVENOUS at 12:47

## 2024-08-16 RX ADMIN — PROPOFOL 150 MG: 10 INJECTION, EMULSION INTRAVENOUS at 07:54

## 2024-08-16 RX ADMIN — LIDOCAINE HYDROCHLORIDE 100 MG: 20 INJECTION, SOLUTION EPIDURAL; INFILTRATION; INTRACAUDAL; PERINEURAL at 07:54

## 2024-08-16 RX ADMIN — SENNOSIDES AND DOCUSATE SODIUM 1 TABLET: 50; 8.6 TABLET ORAL at 20:57

## 2024-08-16 RX ADMIN — ROCURONIUM BROMIDE 30 MG: 10 INJECTION, SOLUTION INTRAVENOUS at 09:30

## 2024-08-16 RX ADMIN — ACETAMINOPHEN 650 MG: 325 TABLET ORAL at 00:01

## 2024-08-16 RX ADMIN — SODIUM BICARBONATE 50 MEQ: 84 INJECTION, SOLUTION INTRAVENOUS at 13:01

## 2024-08-16 RX ADMIN — PROTAMINE SULFATE 400 MG: 10 INJECTION, SOLUTION INTRAVENOUS at 11:16

## 2024-08-16 RX ADMIN — INSULIN HUMAN 3 UNITS: 100 INJECTION, SOLUTION PARENTERAL at 09:31

## 2024-08-16 RX ADMIN — DEXMEDETOMIDINE HYDROCHLORIDE 0.6 MCG/KG/HR: 4 INJECTION, SOLUTION INTRAVENOUS at 13:04

## 2024-08-16 RX ADMIN — INSULIN HUMAN 4 UNITS: 100 INJECTION, SOLUTION PARENTERAL at 10:36

## 2024-08-16 RX ADMIN — FAMOTIDINE 20 MG: 20 TABLET ORAL at 20:57

## 2024-08-16 RX ADMIN — FENTANYL CITRATE 200 MCG: 50 INJECTION, SOLUTION INTRAMUSCULAR; INTRAVENOUS at 08:29

## 2024-08-16 RX ADMIN — MUPIROCIN: 20 OINTMENT TOPICAL at 13:08

## 2024-08-16 RX ADMIN — ACETAMINOPHEN 1000 MG: 500 TABLET ORAL at 20:58

## 2024-08-16 RX ADMIN — SODIUM CHLORIDE, PRESERVATIVE FREE 10 ML: 5 INJECTION INTRAVENOUS at 21:00

## 2024-08-16 RX ADMIN — POTASSIUM CHLORIDE 20 MEQ: 29.8 INJECTION, SOLUTION INTRAVENOUS at 16:33

## 2024-08-16 RX ADMIN — IPRATROPIUM BROMIDE AND ALBUTEROL SULFATE 1 DOSE: 2.5; .5 SOLUTION RESPIRATORY (INHALATION) at 20:13

## 2024-08-16 RX ADMIN — HEPARIN SODIUM 50000 UNITS: 1000 INJECTION INTRAVENOUS; SUBCUTANEOUS at 09:22

## 2024-08-16 RX ADMIN — CHLORHEXIDINE GLUCONATE 0.12% ORAL RINSE 15 ML: 1.2 LIQUID ORAL at 13:08

## 2024-08-16 RX ADMIN — SODIUM BICARBONATE 50 MEQ: 84 INJECTION, SOLUTION INTRAVENOUS at 13:02

## 2024-08-16 RX ADMIN — METOPROLOL TARTRATE 25 MG: 25 TABLET, FILM COATED ORAL at 07:32

## 2024-08-16 RX ADMIN — ROCURONIUM BROMIDE 50 MG: 10 INJECTION, SOLUTION INTRAVENOUS at 09:17

## 2024-08-16 RX ADMIN — PHENYLEPHRINE HYDROCHLORIDE 15 MCG/MIN: 10 INJECTION INTRAVENOUS at 11:39

## 2024-08-16 RX ADMIN — CHLORHEXIDINE GLUCONATE 0.12% ORAL RINSE 15 ML: 1.2 LIQUID ORAL at 05:37

## 2024-08-16 RX ADMIN — MUPIROCIN: 20 OINTMENT TOPICAL at 05:38

## 2024-08-16 RX ADMIN — HYDROMORPHONE HYDROCHLORIDE 0.5 MG: 1 INJECTION, SOLUTION INTRAMUSCULAR; INTRAVENOUS; SUBCUTANEOUS at 19:23

## 2024-08-16 RX ADMIN — SODIUM PHOSPHATE, MONOBASIC, MONOHYDRATE AND SODIUM PHOSPHATE, DIBASIC ANHYDROUS 15 MMOL: 142; 276 INJECTION, SOLUTION INTRAVENOUS at 16:15

## 2024-08-16 RX ADMIN — ALBUMIN (HUMAN) 12.5 G: 12.5 INJECTION, SOLUTION INTRAVENOUS at 11:30

## 2024-08-16 ASSESSMENT — PAIN DESCRIPTION - PAIN TYPE
TYPE: SURGICAL PAIN
TYPE: ACUTE PAIN

## 2024-08-16 ASSESSMENT — PULMONARY FUNCTION TESTS
PIF_VALUE: 27
PIF_VALUE: 34
PIF_VALUE: 28
PIF_VALUE: 27
PIF_VALUE: 28
PIF_VALUE: 27
PIF_VALUE: 18
PIF_VALUE: 18
PIF_VALUE: 27

## 2024-08-16 ASSESSMENT — PAIN - FUNCTIONAL ASSESSMENT
PAIN_FUNCTIONAL_ASSESSMENT: ACTIVITIES ARE NOT PREVENTED
PAIN_FUNCTIONAL_ASSESSMENT: PREVENTS OR INTERFERES SOME ACTIVE ACTIVITIES AND ADLS

## 2024-08-16 ASSESSMENT — PAIN DESCRIPTION - LOCATION
LOCATION: CHEST
LOCATION: HEAD

## 2024-08-16 ASSESSMENT — PAIN DESCRIPTION - ORIENTATION
ORIENTATION: MID

## 2024-08-16 ASSESSMENT — PAIN DESCRIPTION - DESCRIPTORS
DESCRIPTORS: ACHING

## 2024-08-16 ASSESSMENT — PAIN DESCRIPTION - ONSET
ONSET: AWAKENED FROM SLEEP
ONSET: ON-GOING

## 2024-08-16 ASSESSMENT — PAIN SCALES - GENERAL
PAINLEVEL_OUTOF10: 7
PAINLEVEL_OUTOF10: 7
PAINLEVEL_OUTOF10: 5
PAINLEVEL_OUTOF10: 6
PAINLEVEL_OUTOF10: 3
PAINLEVEL_OUTOF10: 10
PAINLEVEL_OUTOF10: 6

## 2024-08-16 ASSESSMENT — PAIN DESCRIPTION - FREQUENCY
FREQUENCY: CONTINUOUS
FREQUENCY: CONTINUOUS

## 2024-08-16 NOTE — PROGRESS NOTES
Patient had 4 minute of aflutter on tele monitor.  Attempted to obtain EKG but converted back to sinus.  Gerardo Cuadra Hospitalist NP On Call made aware and no new orders.   Did obtain morning blood work and sent to Lab.   Patient was asleep during episode and denied any distress noted.   Continue to monitor closely.

## 2024-08-16 NOTE — PROGRESS NOTES
Pt did well with CPAP/PS, awake and following commands, RSBI 44, and NIF -23.  Order received to extubate pt to heated high flow.  Pt subglottic suctioned, cuff deflated, and extubated to 20L 100% Vapotherm.  Will continue to monitor closely and wean as tolerated.

## 2024-08-16 NOTE — PROGRESS NOTES
4 Eyes Skin Assessment     NAME:  Mark Bourgeois  YOB: 1955  MEDICAL RECORD NUMBER:  [id number]    The patient is being assessed for  Transfer to New Unit    I agree that at least one RN has performed a thorough Head to Toe Skin Assessment on the patient. ALL assessment sites listed below have been assessed.      Areas assessed by both nurses:    Head, Face, Ears, Shoulders, Back, Chest, Arms, Elbows, Hands, Sacrum. Buttock, Coccyx, Ischium, and Legs. Feet and Heels        Does the Patient have a Wound? Yes wound(s) were present on assessment. LDA wound assessment was Initiated and completed by RN       Jacek Prevention initiated by RN: No  Wound Care Orders initiated by RN: No    Pressure Injury (Stage 3,4, Unstageable, DTI, NWPT, and Complex wounds) if present, place Wound referral order by RN under : No    New Ostomies, if present place, Ostomy referral order under : No     Nurse 1 eSignature: Electronically signed by Flori Minor RN on 8/15/24 at 9:28 PM EDT    **SHARE this note so that the co-signing nurse can place an eSignature**    Nurse 2 eSignature: {Esignature:[id number]}

## 2024-08-16 NOTE — CONSULTS
I have personally seen and examined the patient independently. I have reviewed the patient's available data including medical history and recent test results. Reviewed and discussed note as documented by CHRISTINA.  I agree with the physical exam findings, assessment and plan. My documented MDM is a substantive portion of the supervisory note.       S/P CABG, preserved EF  Hypoxemia postop secondary to atelectasis  HTN per history  GERD per history  Pulmonary nodule (solitary, well circumscribed, non calcified, right lower lobe location) per history      The patient awakens appropriately, proceed with extubation following downward titration of FiO2 and PEEP, saturation goal 92-94% via pulse oximetry  Postoperative pulmonary hygiene measures  Glycemic control  Hemodynamic support per cardiothoracic surgical service  Ulcer, DVT prophylaxis    I suggest a follow-up CT scan of the chest in approximately 6 months given the patient's age (otherwise he has no significant malignancy risk factors).      Complex decisions required for evaluation management reviewed during critical care rounds      I have reviewed all pertinent laboratory data imaging studies        Exam obese male orally intubated, mildly sedated (arouses), vitals reviewed.  Head normal.  Hearing intact (partially opens eyes to name).  Oral endotracheal tube.  Chest exam notable for abundant soft tissue structures.  Sternotomy, mediastinal chest tubes present.  Clear breath sounds upper lung zones bilaterally.  Regular rate rhythm subtle gallop.  Abdomen soft central obesity absence of bowel sounds.  Extremities are warm to palpation intact pulses.  Neurologic examination limited by sedation.        E Cordasco  [phone]

## 2024-08-16 NOTE — ANESTHESIA PROCEDURE NOTES
Central Venous Line:    A central venous line was placed using ultrasound guidance and surface landmarks, in the OR for the following indication(s): central venous access and CVP monitoring.8/16/2024 8:05 AM8/16/2024 8:12 AM    Sterility preparation included the following: provider used sterile gloves, gown, hat and mask, hand hygiene performed prior to central venous catheter insertion, sterile gel and probe cover used in ultrasound-guided central venous catheter insertion and maximum sterile barriers used during central venous catheter insertion.    The patient was placed in Trendelenburg position.The right internal jugular vein was prepped.    The site was prepped with Chloraprep.  A 9 Fr (size), introducer double lumen was placed.    During the procedure, the following specific steps were taken: target vein identified, needle advanced into vein and blood aspirated and guidewire advanced into vein.    Intravenous verification was obtained by ultrasound and venous blood return.    Post insertion care included: all ports aspirated, all ports flushed easily, guidewire removed intact, Biopatch applied, line sutured in place and dressing applied.    During the procedure the patient experienced: patient tolerated procedure well with no complications and EBL < 5mL.      Outcomes: uncomplicated and patient tolerated procedure well  Anesthesia type: general..No  Staffing  Performed: Resident/CRNA   Anesthesiologist: Antony Villalba MD  Resident/CRNA: Gaurang Cardenas APRN - CRNA  Performed by: Gaurang Cardenas APRN - CRNA  Authorized by: Antony Villalba MD    Preanesthetic Checklist  Completed: patient identified, IV checked, site marked, risks and benefits discussed, surgical/procedural consents, equipment checked, pre-op evaluation, timeout performed, anesthesia consent given, oxygen available, monitors applied/VS acknowledged, fire risk safety assessment completed and verbalized and blood product R/B/A discussed and

## 2024-08-16 NOTE — ANESTHESIA PROCEDURE NOTES
Arterial Line:    An arterial line was placed using ultrasound guidance, in the OR for the following indication(s): continuous blood pressure monitoring and blood sampling needed.    A 20 gauge (size), 4.45 cm (length), Arrow (type) catheter was placed, Seldinger technique used, into the left radial artery, secured by tape.  Anesthesia type: General    Events:  patient tolerated procedure well with no complications and EBL 0mL.8/16/2024 7:55 AM8/16/2024 8:00 AM  Anesthesiologist: Antony Villalba MD  Performed: Anesthesiologist   Preanesthetic Checklist  Completed: patient identified, IV checked, site marked, risks and benefits discussed, surgical/procedural consents, equipment checked, pre-op evaluation, timeout performed, anesthesia consent given, oxygen available, monitors applied/VS acknowledged, fire risk safety assessment completed and verbalized and blood product R/B/A discussed and consented

## 2024-08-16 NOTE — PROGRESS NOTES
Called to room for pt brought from O.R.  Pt placed on vent at ordered settings.  ETT placement verified with CRNA at 26cm@lip.  Will secure with commercial ETT courtney after CXR.       08/16/24 1236   Patient Observation   Pulse 73   Respirations 17   SpO2 98 %   Vent Information   Ventilator Initiate Yes   Vent Mode AC/VC   $Ventilation $Initial Day   Ventilator Settings   Vt (Set, mL) 600 mL   Resp Rate (Set) 16 bpm   PEEP/CPAP (cmH2O) 7   FiO2  100 %   Vent Patient Data (Readings)   Vt (Measured) 616 mL   Peak Inspiratory Pressure (cmH2O) 27 cmH2O   Rate Measured 16 br/min   Minute Volume (L/min) 9.79 Liters   Mean Airway Pressure (cmH2O) 13 cmH20   Plateau Pressure (cm H2O) 0 cm H2O   Driving Pressure -7   I:E Ratio 1:2.40   Flow Sensitivity 3 L/min   Backup Apnea On   Backup Rate 16 Breaths Per Minute   Backup Vt 600   Vent Alarm Settings   High Pressure (cmH2O) 40 cmH2O   Low Minute Volume (lpm) 2.5 L/min   High Minute Volume (lpm) 20 L/min   Low Exhaled Vt (ml) 250 mL   High Exhaled Vt (ml) 1000 mL   RR High (bpm) 40 br/min   Apnea (secs) 20 secs   Additional Respiratoray Assessments   Humidification Source HME   Ambu Bag With Mask At Bedside Yes   [REMOVED] ETT    Removal Date/Time: 08/16/24 1244  Placement Date/Time: 08/16/24 0814   Placed By: Licensed provider  Placement Verified By: Capnometry;Direct visualization  Preoxygenation: Yes  Mask Ventilation: Ventilated by mask with oral airway (2);Difficult mask ...   Secured At 26 cm   Measured From Lips   ETT Placement Right   Secured By Cloth tape   Site Assessment Dry   Cuff Pressure   (mov)

## 2024-08-16 NOTE — PROGRESS NOTES
V2.0  [facility] [place] [facility] Consult Note      Name:  Mark Bourgeois /Age/Sex: 1955  (69 y.o. male)   MRN & CSN:  [id number] & [id number] Encounter Date/Time: 2024 5:17 PM EDT   Location:  -A PCP: Hyacinth Garcia MD     Attending:Eddie Zavala MD  Consulting Provider: Lu Carver MD      Hospital Day: 3    Assessment and Recommendations   Mark Bourgeois is a 69 y.o. male who presents with Abnormal nuclear stress test    Recommendations:    # CAD s/p 3V CABG   -Left heart cath : Proximal LAD stenosis of 70 to 80%, first OM stenosis of 70 to 80%, 100% occluded possible  or 99% stenosis of ostial RCA, RCA filled by collaterals from the left, right subclavian artery is 100% occluded  -completed CT chest, BLE vein mapping and carotid duplex  -Echo 8/15: EF 55-60% indeterminate diastolic function, mod to severely dilated RV, mild TR  CT surgery primary  Cardiology following, appreciate recs  Aspirin and statin  Metoprolol succinate 200 mg nightly    #Lung nodule x2  -3.7 cm x 2.4 cm nodule at the L paratracheal location (thyroid nodule vs mediastinal adenopathy) and 8 mm nodule of RLL  CT and/or thyroid US as outpatient   Repeat CT chest in 6-12 months  to reassess RLL nodule    # HTN  Continue amlodipine    # Class III obesity  -BMI 41.72  -low HDL, A1c: 5.8%  Will need count counseling for weight reduction    # GERD  Continue famotidine      Diet Diet NPO   DVT Prophylaxis [] Lovenox, []  Heparin, [x] SCDs, [x] Ambulation,  [] Eliquis, [] Xarelto   Code Status Full Code   Surrogate Decision Maker/ POA  Jenna Tripathi     Subjective:     Patient seen post-procedure. Was still intubated.    Review of Systems:    Review of Systems    As above    Objective:     Intake/Output Summary (Last 24 hours) at 2024 0834  Last data filed at 2024 0700  Gross per 24 hour   Intake 480 ml   Output 2050 ml   Net -1570 ml      Vitals:   Vitals:    24 0300 24 0400 24 0500 24 0600    BP: (!) 150/85 135/67 117/62 137/81   Pulse: 71 60 58 74   Resp: 18 12 13 12   Temp:  97.6 °F (36.4 °C)     TempSrc:  Oral     SpO2: 95% 92% 94% 96%   Weight:       Height:           Medications Prior to Admission     Prior to Admission medications    Medication Sig Start Date End Date Taking? Authorizing Provider   atorvastatin (LIPITOR) 40 MG tablet Take 2 tablets by mouth daily 5/23/24  Yes Maggie Kauffman APRN - CNP   metoprolol succinate (TOPROL XL) 200 MG extended release tablet Take 1 tablet by mouth at bedtime 5/16/24  Yes Maggie Kauffman APRN - CNP   amLODIPine (NORVASC) 10 MG tablet Take 1 tablet by mouth daily 4/4/24  Yes Hyacinth Garcia MD   famotidine (PEPCID) 40 MG tablet Take 1 tablet by mouth 2 times daily 4/4/24  Yes Hyacinth Garcia MD   aspirin 81 MG EC tablet Oral 7/30/15  Yes Provider, MD Magdiel   triamterene-hydroCHLOROthiazide (MAXZIDE-25) 37.5-25 MG per tablet Take 1 tablet by mouth daily as needed (swelling)  Patient not taking: Reported on 4/4/2024 2/20/24   Maggie Kauffman APRN - CNP   magnesium oxide (MAG-OX) 400 MG tablet Take 1 tablet by mouth daily  Patient not taking: Reported on 8/5/2024 11/20/23   Maggie Kauffman APRN - CNP       Physical Exam: Need 8 Elements   General: NAD, obese, intubated and sedated  Eyes: EOMI  HENT: Atraumatic  Respiratory: no respiratory distress, mechanical breath sounds, intubated  GI: soft, nondistended, nontender  MSK: no lower extremity edema, R arm on stabilizing board  Skin: Intact, dry, warm, no rashes  Neuro: sedated    Medications:   Medications:    silver sulfADIAZINE   Topical Daily    sodium chloride flush  5-40 mL IntraVENous 2 times per day    ceFAZolin (ANCEF) IVPB  3,000 mg IntraVENous On Call to OR    mupirocin   Each Nostril BID    chlorhexidine gluconate   Topical See Admin Instructions    aminocaproic acid (AMICAR) 10,000 mg in sodium chloride 0.9 % 210 mL infusion  1,000 mg/hr IntraVENous On Call to OR    heparin (porcine)

## 2024-08-16 NOTE — ANESTHESIA POSTPROCEDURE EVALUATION
Department of Anesthesiology  Postprocedure Note    Patient: Mark Bourgeois  MRN: [id number]  YOB: 1955  Date of evaluation: 8/16/2024    Procedure Summary       Date: 08/16/24 Room / Location: [address]    Anesthesia Start: 0742 Anesthesia Stop: 1252    Procedure: CORONARY ARTERY BYPASS GRAFT X3; LIMA-LAD; SVG-OM1; SVG- PDA; LEFT ENDOSCOPIC GREATER SAPHENOUS VEIN HARVEST; LINA (Chest) Diagnosis:       Coronary artery disease involving native coronary artery of native heart without angina pectoris      (Coronary artery disease involving native coronary artery of native heart without angina pectoris [I25.10])    Surgeons: Eddie Zavala MD Responsible Provider: Joshua Valencia MD    Anesthesia Type: general ASA Status: 4            Anesthesia Type: No value filed.    Rei Phase I: Rei Score: 10    Rei Phase II:      Anesthesia Post Evaluation    Patient location during evaluation: ICU  Patient participation: complete - patient participated  Level of consciousness: sedated and ventilated  Airway patency: patent  Nausea & Vomiting: no nausea and no vomiting  Cardiovascular status: hemodynamically stable  Respiratory status: ventilator  Hydration status: euvolemic  Pain management: adequate    No notable events documented.

## 2024-08-16 NOTE — H&P
History and Physical 24        NAME: Mark Bourgeois  : 1955  MRN: [id number]      Assessment/Plan:  Mark Bourgeois is a 69 y.o. male with a history of hypertension, obesity and GERD who presented to [facility] 2024 with abnormal nuclear stress test.  Admitted to CVICU 24 s/p CABG x 3      Problem list  Severe multivessel coronary artery disease  S/p CABG x 3 24  Right subclavian arterial occlusion  Hx hypertension  Hx obesity Body mass index is 42.9 kg/m².  Hx GERD  Hx pulmonary nodules      Neuro: Intubated and sedated post operatively, SAT as able.   Cardio: S/p CABG x 3, (LAD, OM1 and PDA).  Postoperatively on epinephrine and phenyl epinephrine, continue to wean as able maintain MAP goal >65  Resp: Intubated, continue mechanical ventilation.  Maintain SpO2 >92.  SAT and SBT as able.  Extubate per protocol when clinically ready.  Pulmonary hygiene.  Bronchodilators as needed.  GI: No acute concerns.  PPI for GI PPx  : No acute concerns, optimize electrolytes.  Replace and recheck as needed.  Heme: Stable.  Hgb 11.9.  Transfuse for Hgb <7.  Mediastinal and pleural chest tubes in situ, appropriate output.  Heparin SQ for DVT PPx  ID: Continue to monitor off of antibiotics.  Does not appear to be infectious.  Endo: Hyperglycemic, maintain euglycemia.  Insulin drip per protocol  MSK: PT OT as able    Tubes/Lines/Drains:    CT x 3, PIV, CBC  Code Status:   Full code       Chief Complaint:    Abnormal stress test    History of Present Illness:    Mark Bourgeois is a 69 y.o. male with a history of hypertension, obesity and GERD who presented to [facility] 2024 with abnormal nuclear stress test.  Patient found to have severe multi vessel coronary artery disease, shortness of breath, bilateral edema.  He was admitted to CVICU 24 s/p CABG x 3 (LAD, OM1 and PDA).  On exam patient awakes appropriately, will continue to wean ventilator support and extubate as appropriate per protocol.  On  Tricuspid Valve: Mild regurgitation. The estimated RVSP is 30 mmHg.   Pericardium: Prominent epicardial fat. Physiologic amount of pericardial fluid noted.     Vascular duplex vein mapping lower bilateral    Result Date: 8/15/2024  Venous mapping HISTORY: Preoperative. Compressible deep veins of the lower extremities. There was measurement of the greater saphenous veins bilaterally. See worksheet for complete details. The right greater saphenous measures approximately 7, 3.9, 3.2, 2.1, 2.6 mm at the knee, 2.5, 3.5 and 2.4 below the knee. Not tortuous. The left saphenous vein measures 7.1, 5.9, 3.0, 4.2, 3.9 mm to the knee, 4.3, 3.7, 2.5 mm below the knee. Some tortuosity reported.     Bilateral greater saphenous vein mapping. Electronically signed by Arsalan Eli    Vascular duplex carotid bilateral    Result Date: 8/15/2024  . PROCEDURE: BILATERAL CAROTID DOPPLER ULTRASOUND CLINICAL INDICATION: Male, 69 years old. Pre-op carotid evaluation TECHNIQUE: Grayscale spectral Doppler on analysis was performed of the cervical carotid and vertebral arteries bilaterally. Validated velocity measurements with angiographic measurements, velocity criteria or extrapolated from diameter data as defined by the society radiologist in ultrasound consensus conference radiology 2003; 229; 340-346. COMPARISON: NONE FINDINGS: RIGHT: Velocities are as follows: CCA PSV = 136/96 cm/s, ICA PSV = 67/75/62 cm/s, ECA PSV = 118 cm/s, ICA/CCA Ratio = 0.8 cm/s Right Vertebral Artery: 57. LEFT: Velocities are as follows: CCA PSV = 106/81 cm/s, ICA PSV = 126/53/38 cm/s, ECA PSV = 87 cm/s, ICA/CCA Ratio = 1.6 cm/s Left Vertebral Artery: 108.     1.  No hemodynamically significant carotid artery stenosis is seen. Electronically signed by Claudia Carranza MD    CT CHEST WO CONTRAST    Result Date: 8/15/2024  EXAM: CT of the chest without contrast HISTORY: preop CABG COMPARISON: May 31, 2016 TECHNIQUE: CT of the chest was performed without

## 2024-08-16 NOTE — OP NOTE
8/16/2024    PATIENT: Mark Bourgeois    PREOPERATIVE DIAGNOSIS:  Severe coronary artery disease.    POSTOPERATIVE DIAGNOSIS:  Severe coronary artery disease.     ASSISTANT:  RAFAEL Hensley    OPERATION:  Left internal mammary artery graft to the mid left anterior descending coronary artery, saphenous vein bypass graft from the aorta to OM1 coronary artery, saphenous vein bypass graft from the aorta to PDA coronary artery (three distal anastamosis) using cardiopulmonary bypass, mild hypothermia, cold cardioplegia.      FINDINGS:  The heart has severe hypertrophic cardiomyopathy and aorta were of normal size.  The LAD, OM1 and PDA were 1.5 mm in diameter, distal targets has mild calcification.      EBL:  300 mL    PQRI measures:  1. Patient received pre-operative beta-blockers.  2. Patient received pre-operative antibiotics.  3. Internal mammary artery was used.    TECHNIQUE:  Routine pressure lines were inserted for monitoring.  Suitable segments of long saphenous vein were removed from the left leg using endoscpic harvesting technique.  A primary median sternotomy was performed.  The left internal mammary artery was taken down; this vessel had good caliber and flow.  Cardiopulmonary bypass was established with a single cannula in the ascending aorta and a single cannula in the right atrium.  When on bypass, the patient was cooled to 32 degrees centigrade.  The ascending aorta was   cross-clamped, and cardioplegic solution was infused into the aortic root.  The heart was topically cooled with cold saline.  Antegrade cardioplegia was used.        The OM1 coronary artery was identified and opened, and a segment of long saphenous vein was anastomosed to this artery.  The PDA coronary artery was identified and opened, and a segment of long saphenous vein was anastomosed to this artery.  The mid LAD was identified and opened, and the left internal mammary artery was run extrapericardially, brought through a slit in the

## 2024-08-16 NOTE — PROGRESS NOTES
08/16/24 1617   Patient Observation   Pulse 81   Respirations 12   SpO2 96 %   Vent Information   Vent Mode (S)  CPAP/PS   Ventilator Settings   PEEP/CPAP (cmH2O) 7   FiO2  (S)  70 %   Pressure Support (cm H2O) 10 cm H2O   Vent Patient Data (Readings)   Vt (Measured) 814 mL   Peak Inspiratory Pressure (cmH2O) 18 cmH2O   Rate Measured 12 br/min   Minute Volume (L/min) 8.18 Liters   Mean Airway Pressure (cmH2O) 11 cmH20   Plateau Pressure (cm H2O) 0 cm H2O   Driving Pressure -7   I:E Ratio 1:1.90   Flow Sensitivity 3 L/min   Backup Apnea On   Backup Rate 16 Breaths Per Minute   Backup Vt 600   Vent Alarm Settings   High Pressure (cmH2O) 40 cmH2O   Low Minute Volume (lpm) 2.5 L/min   High Minute Volume (lpm) 20 L/min   Low Exhaled Vt (ml) 250 mL   High Exhaled Vt (ml) 1000 mL   RR High (bpm) 40 br/min   Apnea (secs) 20 secs   Additional Respiratoray Assessments   Humidification Source HME   Ambu Bag With Mask At Bedside Yes   [REMOVED] ETT    Removal Date/Time: 08/16/24 1244  Placement Date/Time: 08/16/24 0814   Placed By: Licensed provider  Placement Verified By: Capnometry;Direct visualization  Preoxygenation: Yes  Mask Ventilation: Ventilated by mask with oral airway (2);Difficult mask ...   Secured At 26 cm   Measured From Lips   ETT Placement Center   Secured By Commercial tube courtney   Site Assessment Dry

## 2024-08-17 ENCOUNTER — APPOINTMENT (OUTPATIENT)
Dept: GENERAL RADIOLOGY | Age: 69
DRG: 233 | End: 2024-08-17
Attending: INTERNAL MEDICINE
Payer: COMMERCIAL

## 2024-08-17 LAB
ANION GAP SERPL CALCULATED.3IONS-SCNC: 13 MMOL/L (ref 7–16)
APTT: 32.7 SECONDS (ref 25.1–37.1)
BUN SERPL-MCNC: 15 MG/DL (ref 6–23)
CALCIUM IONIZED: ABNORMAL MMOL/L (ref 1.12–1.32)
CALCIUM IONIZED: NORMAL MMOL/L (ref 1.12–1.32)
CALCIUM SERPL-MCNC: 8.4 MG/DL (ref 8.3–10.6)
CHLORIDE BLD-SCNC: 109 MMOL/L (ref 99–110)
CO2: 19 MMOL/L (ref 21–32)
CREAT SERPL-MCNC: 0.9 MG/DL (ref 0.9–1.3)
FIBRINOGEN: 404 MG/DL (ref 170–540)
GFR, ESTIMATED: >90 ML/MIN/1.73M2
GLUCOSE BLD-MCNC: 100 MG/DL (ref 70–99)
GLUCOSE BLD-MCNC: 101 MG/DL (ref 70–99)
GLUCOSE BLD-MCNC: 106 MG/DL (ref 70–99)
GLUCOSE BLD-MCNC: 107 MG/DL (ref 70–99)
GLUCOSE BLD-MCNC: 111 MG/DL (ref 70–99)
GLUCOSE BLD-MCNC: 118 MG/DL (ref 70–99)
GLUCOSE BLD-MCNC: 120 MG/DL (ref 70–99)
GLUCOSE BLD-MCNC: 120 MG/DL (ref 70–99)
GLUCOSE BLD-MCNC: 122 MG/DL (ref 70–99)
GLUCOSE BLD-MCNC: 124 MG/DL (ref 70–99)
GLUCOSE BLD-MCNC: 142 MG/DL (ref 70–99)
GLUCOSE BLD-MCNC: 97 MG/DL (ref 70–99)
GLUCOSE BLD-MCNC: 99 MG/DL (ref 70–99)
GLUCOSE SERPL-MCNC: 106 MG/DL (ref 70–99)
HCT VFR BLD CALC: 36.4 % (ref 42–52)
HEMOGLOBIN: 11.7 GM/DL (ref 13.5–18)
INR BLD: 1.2 INDEX
MAGNESIUM: 2.2 MG/DL (ref 1.8–2.4)
MCH RBC QN AUTO: 28.1 PG (ref 27–31)
MCHC RBC AUTO-ENTMCNC: 32.1 % (ref 32–36)
MCV RBC AUTO: 87.3 FL (ref 78–100)
PDW BLD-RTO: 13.5 % (ref 11.7–14.9)
PHOSPHORUS: 3.5 MG/DL (ref 2.5–4.9)
PLATELET # BLD: 168 K/CU MM (ref 140–440)
PMV BLD AUTO: 9.9 FL (ref 7.5–11.1)
POTASSIUM SERPL-SCNC: 3.8 MMOL/L (ref 3.5–5.1)
POTASSIUM SERPL-SCNC: 4.1 MMOL/L (ref 3.5–5.1)
POTASSIUM SERPL-SCNC: 4.2 MMOL/L (ref 3.5–5.1)
PROTHROMBIN TIME: 15.7 SECONDS (ref 11.7–14.5)
RBC # BLD: 4.17 M/CU MM (ref 4.6–6.2)
SODIUM BLD-SCNC: 141 MMOL/L (ref 135–145)
WBC # BLD: 13.6 K/CU MM (ref 4–10.5)

## 2024-08-17 PROCEDURE — 85027 COMPLETE CBC AUTOMATED: CPT

## 2024-08-17 PROCEDURE — 2700000000 HC OXYGEN THERAPY PER DAY

## 2024-08-17 PROCEDURE — 94640 AIRWAY INHALATION TREATMENT: CPT

## 2024-08-17 PROCEDURE — 84132 ASSAY OF SERUM POTASSIUM: CPT

## 2024-08-17 PROCEDURE — 6360000002 HC RX W HCPCS: Performed by: PHYSICIAN ASSISTANT

## 2024-08-17 PROCEDURE — 6370000000 HC RX 637 (ALT 250 FOR IP): Performed by: PHYSICIAN ASSISTANT

## 2024-08-17 PROCEDURE — 97166 OT EVAL MOD COMPLEX 45 MIN: CPT

## 2024-08-17 PROCEDURE — 6360000002 HC RX W HCPCS: Performed by: NURSE PRACTITIONER

## 2024-08-17 PROCEDURE — 83735 ASSAY OF MAGNESIUM: CPT

## 2024-08-17 PROCEDURE — 94150 VITAL CAPACITY TEST: CPT

## 2024-08-17 PROCEDURE — 97116 GAIT TRAINING THERAPY: CPT

## 2024-08-17 PROCEDURE — 94761 N-INVAS EAR/PLS OXIMETRY MLT: CPT

## 2024-08-17 PROCEDURE — 94669 MECHANICAL CHEST WALL OSCILL: CPT

## 2024-08-17 PROCEDURE — 71045 X-RAY EXAM CHEST 1 VIEW: CPT

## 2024-08-17 PROCEDURE — 82962 GLUCOSE BLOOD TEST: CPT

## 2024-08-17 PROCEDURE — 82330 ASSAY OF CALCIUM: CPT

## 2024-08-17 PROCEDURE — 85730 THROMBOPLASTIN TIME PARTIAL: CPT

## 2024-08-17 PROCEDURE — 2100000000 HC CCU R&B

## 2024-08-17 PROCEDURE — 6370000000 HC RX 637 (ALT 250 FOR IP): Performed by: NURSE PRACTITIONER

## 2024-08-17 PROCEDURE — 2500000003 HC RX 250 WO HCPCS: Performed by: PHYSICIAN ASSISTANT

## 2024-08-17 PROCEDURE — 84100 ASSAY OF PHOSPHORUS: CPT

## 2024-08-17 PROCEDURE — 97530 THERAPEUTIC ACTIVITIES: CPT

## 2024-08-17 PROCEDURE — 2580000003 HC RX 258: Performed by: PHYSICIAN ASSISTANT

## 2024-08-17 PROCEDURE — 97163 PT EVAL HIGH COMPLEX 45 MIN: CPT

## 2024-08-17 PROCEDURE — 80048 BASIC METABOLIC PNL TOTAL CA: CPT

## 2024-08-17 PROCEDURE — 85384 FIBRINOGEN ACTIVITY: CPT

## 2024-08-17 PROCEDURE — 94664 DEMO&/EVAL PT USE INHALER: CPT

## 2024-08-17 PROCEDURE — 85610 PROTHROMBIN TIME: CPT

## 2024-08-17 PROCEDURE — 6370000000 HC RX 637 (ALT 250 FOR IP): Performed by: THORACIC SURGERY (CARDIOTHORACIC VASCULAR SURGERY)

## 2024-08-17 RX ORDER — INSULIN LISPRO 100 [IU]/ML
0-4 INJECTION, SOLUTION INTRAVENOUS; SUBCUTANEOUS NIGHTLY
Status: DISCONTINUED | OUTPATIENT
Start: 2024-08-17 | End: 2024-08-23 | Stop reason: HOSPADM

## 2024-08-17 RX ORDER — TAMSULOSIN HYDROCHLORIDE 0.4 MG/1
0.4 CAPSULE ORAL DAILY
Status: DISCONTINUED | OUTPATIENT
Start: 2024-08-17 | End: 2024-08-23 | Stop reason: HOSPADM

## 2024-08-17 RX ORDER — POTASSIUM CHLORIDE 1500 MG/1
10 TABLET, EXTENDED RELEASE ORAL 2 TIMES DAILY
Status: DISCONTINUED | OUTPATIENT
Start: 2024-08-17 | End: 2024-08-23 | Stop reason: HOSPADM

## 2024-08-17 RX ORDER — INSULIN LISPRO 100 [IU]/ML
0-4 INJECTION, SOLUTION INTRAVENOUS; SUBCUTANEOUS
Status: DISCONTINUED | OUTPATIENT
Start: 2024-08-17 | End: 2024-08-23 | Stop reason: HOSPADM

## 2024-08-17 RX ORDER — METOPROLOL TARTRATE 25 MG/1
25 TABLET, FILM COATED ORAL 2 TIMES DAILY
Status: DISCONTINUED | OUTPATIENT
Start: 2024-08-17 | End: 2024-08-18

## 2024-08-17 RX ORDER — FUROSEMIDE 10 MG/ML
20 INJECTION INTRAMUSCULAR; INTRAVENOUS 2 TIMES DAILY
Status: DISCONTINUED | OUTPATIENT
Start: 2024-08-17 | End: 2024-08-18

## 2024-08-17 RX ADMIN — HEPARIN SODIUM 5000 UNITS: 5000 INJECTION INTRAVENOUS; SUBCUTANEOUS at 13:53

## 2024-08-17 RX ADMIN — GABAPENTIN 300 MG: 300 CAPSULE ORAL at 13:53

## 2024-08-17 RX ADMIN — IPRATROPIUM BROMIDE AND ALBUTEROL SULFATE 1 DOSE: 2.5; .5 SOLUTION RESPIRATORY (INHALATION) at 11:21

## 2024-08-17 RX ADMIN — SENNOSIDES AND DOCUSATE SODIUM 1 TABLET: 50; 8.6 TABLET ORAL at 20:52

## 2024-08-17 RX ADMIN — HYDROMORPHONE HYDROCHLORIDE 0.5 MG: 1 INJECTION, SOLUTION INTRAMUSCULAR; INTRAVENOUS; SUBCUTANEOUS at 09:13

## 2024-08-17 RX ADMIN — SODIUM CHLORIDE, PRESERVATIVE FREE 10 ML: 5 INJECTION INTRAVENOUS at 08:11

## 2024-08-17 RX ADMIN — CALCIUM CHLORIDE 1000 MG: 100 INJECTION, SOLUTION INTRAVENOUS at 10:25

## 2024-08-17 RX ADMIN — ACETAMINOPHEN 1000 MG: 500 TABLET ORAL at 12:31

## 2024-08-17 RX ADMIN — FAMOTIDINE 20 MG: 20 TABLET ORAL at 08:11

## 2024-08-17 RX ADMIN — TRAMADOL HYDROCHLORIDE 50 MG: 50 TABLET ORAL at 03:04

## 2024-08-17 RX ADMIN — SODIUM CHLORIDE: 9 INJECTION, SOLUTION INTRAVENOUS at 12:45

## 2024-08-17 RX ADMIN — POTASSIUM CHLORIDE 10 MEQ: 1500 TABLET, EXTENDED RELEASE ORAL at 20:52

## 2024-08-17 RX ADMIN — CHLORHEXIDINE GLUCONATE 0.12% ORAL RINSE 15 ML: 1.2 LIQUID ORAL at 08:11

## 2024-08-17 RX ADMIN — GABAPENTIN 300 MG: 300 CAPSULE ORAL at 08:11

## 2024-08-17 RX ADMIN — ONDANSETRON 4 MG: 2 INJECTION INTRAMUSCULAR; INTRAVENOUS at 21:11

## 2024-08-17 RX ADMIN — SODIUM CHLORIDE, PRESERVATIVE FREE 10 ML: 5 INJECTION INTRAVENOUS at 20:58

## 2024-08-17 RX ADMIN — SODIUM CHLORIDE 3000 MG: 900 INJECTION INTRAVENOUS at 15:44

## 2024-08-17 RX ADMIN — MUPIROCIN: 20 OINTMENT TOPICAL at 20:54

## 2024-08-17 RX ADMIN — POTASSIUM CHLORIDE 20 MEQ: 29.8 INJECTION, SOLUTION INTRAVENOUS at 19:02

## 2024-08-17 RX ADMIN — HYDROMORPHONE HYDROCHLORIDE 0.5 MG: 1 INJECTION, SOLUTION INTRAMUSCULAR; INTRAVENOUS; SUBCUTANEOUS at 14:09

## 2024-08-17 RX ADMIN — IPRATROPIUM BROMIDE AND ALBUTEROL SULFATE 1 DOSE: 2.5; .5 SOLUTION RESPIRATORY (INHALATION) at 20:52

## 2024-08-17 RX ADMIN — FAMOTIDINE 20 MG: 20 TABLET ORAL at 20:52

## 2024-08-17 RX ADMIN — HEPARIN SODIUM 5000 UNITS: 5000 INJECTION INTRAVENOUS; SUBCUTANEOUS at 21:36

## 2024-08-17 RX ADMIN — METOPROLOL TARTRATE 25 MG: 25 TABLET, FILM COATED ORAL at 20:52

## 2024-08-17 RX ADMIN — HEPARIN SODIUM 5000 UNITS: 5000 INJECTION INTRAVENOUS; SUBCUTANEOUS at 06:17

## 2024-08-17 RX ADMIN — ONDANSETRON 4 MG: 2 INJECTION INTRAMUSCULAR; INTRAVENOUS at 09:27

## 2024-08-17 RX ADMIN — IPRATROPIUM BROMIDE AND ALBUTEROL SULFATE 1 DOSE: 2.5; .5 SOLUTION RESPIRATORY (INHALATION) at 14:58

## 2024-08-17 RX ADMIN — IPRATROPIUM BROMIDE AND ALBUTEROL SULFATE 1 DOSE: 2.5; .5 SOLUTION RESPIRATORY (INHALATION) at 07:23

## 2024-08-17 RX ADMIN — ATORVASTATIN CALCIUM 40 MG: 40 TABLET, FILM COATED ORAL at 20:52

## 2024-08-17 RX ADMIN — FUROSEMIDE 20 MG: 10 INJECTION, SOLUTION INTRAMUSCULAR; INTRAVENOUS at 17:37

## 2024-08-17 RX ADMIN — HYDROMORPHONE HYDROCHLORIDE 0.5 MG: 1 INJECTION, SOLUTION INTRAMUSCULAR; INTRAVENOUS; SUBCUTANEOUS at 18:16

## 2024-08-17 RX ADMIN — POLYETHYLENE GLYCOL (3350) 17 G: 17 POWDER, FOR SOLUTION ORAL at 08:11

## 2024-08-17 RX ADMIN — POTASSIUM CHLORIDE 20 MEQ: 29.8 INJECTION, SOLUTION INTRAVENOUS at 10:22

## 2024-08-17 RX ADMIN — SENNOSIDES AND DOCUSATE SODIUM 1 TABLET: 50; 8.6 TABLET ORAL at 08:11

## 2024-08-17 RX ADMIN — POTASSIUM CHLORIDE 20 MEQ: 29.8 INJECTION, SOLUTION INTRAVENOUS at 13:58

## 2024-08-17 RX ADMIN — Medication 1 TABLET: at 08:11

## 2024-08-17 RX ADMIN — CALCIUM CHLORIDE 1000 MG: 100 INJECTION, SOLUTION INTRAVENOUS at 01:48

## 2024-08-17 RX ADMIN — TRAMADOL HYDROCHLORIDE 50 MG: 50 TABLET ORAL at 16:38

## 2024-08-17 RX ADMIN — TAMSULOSIN HYDROCHLORIDE 0.4 MG: 0.4 CAPSULE ORAL at 12:31

## 2024-08-17 RX ADMIN — ACETAMINOPHEN 1000 MG: 500 TABLET ORAL at 17:37

## 2024-08-17 RX ADMIN — HYDROMORPHONE HYDROCHLORIDE 0.5 MG: 1 INJECTION, SOLUTION INTRAMUSCULAR; INTRAVENOUS; SUBCUTANEOUS at 05:03

## 2024-08-17 RX ADMIN — ASPIRIN 81 MG: 81 TABLET, CHEWABLE ORAL at 08:11

## 2024-08-17 RX ADMIN — SODIUM CHLORIDE 3000 MG: 900 INJECTION INTRAVENOUS at 08:28

## 2024-08-17 RX ADMIN — MUPIROCIN: 20 OINTMENT TOPICAL at 08:11

## 2024-08-17 RX ADMIN — POTASSIUM CHLORIDE 10 MEQ: 1500 TABLET, EXTENDED RELEASE ORAL at 09:13

## 2024-08-17 RX ADMIN — CHLORHEXIDINE GLUCONATE 0.12% ORAL RINSE 15 ML: 1.2 LIQUID ORAL at 20:55

## 2024-08-17 RX ADMIN — GABAPENTIN 300 MG: 300 CAPSULE ORAL at 20:52

## 2024-08-17 RX ADMIN — ACETAMINOPHEN 1000 MG: 500 TABLET ORAL at 03:04

## 2024-08-17 RX ADMIN — SODIUM CHLORIDE, PRESERVATIVE FREE 10 ML: 5 INJECTION INTRAVENOUS at 05:03

## 2024-08-17 RX ADMIN — FUROSEMIDE 20 MG: 10 INJECTION, SOLUTION INTRAMUSCULAR; INTRAVENOUS at 09:13

## 2024-08-17 RX ADMIN — METOPROLOL TARTRATE 25 MG: 25 TABLET, FILM COATED ORAL at 09:13

## 2024-08-17 RX ADMIN — POTASSIUM CHLORIDE 20 MEQ: 29.8 INJECTION, SOLUTION INTRAVENOUS at 08:37

## 2024-08-17 RX ADMIN — TRAMADOL HYDROCHLORIDE 50 MG: 50 TABLET ORAL at 10:13

## 2024-08-17 ASSESSMENT — PAIN DESCRIPTION - DESCRIPTORS
DESCRIPTORS: ACHING
DESCRIPTORS: ACHING;SORE
DESCRIPTORS: ACHING
DESCRIPTORS: ACHING

## 2024-08-17 ASSESSMENT — PAIN DESCRIPTION - LOCATION
LOCATION: CHEST
LOCATION: CHEST
LOCATION: BACK
LOCATION: CHEST

## 2024-08-17 ASSESSMENT — PAIN SCALES - GENERAL
PAINLEVEL_OUTOF10: 8
PAINLEVEL_OUTOF10: 6
PAINLEVEL_OUTOF10: 7
PAINLEVEL_OUTOF10: 4
PAINLEVEL_OUTOF10: 7
PAINLEVEL_OUTOF10: 4
PAINLEVEL_OUTOF10: 3
PAINLEVEL_OUTOF10: 5
PAINLEVEL_OUTOF10: 6
PAINLEVEL_OUTOF10: 3
PAINLEVEL_OUTOF10: 8

## 2024-08-17 ASSESSMENT — PAIN DESCRIPTION - ORIENTATION
ORIENTATION: MID
ORIENTATION: INNER

## 2024-08-17 ASSESSMENT — PAIN DESCRIPTION - ONSET: ONSET: GRADUAL

## 2024-08-17 ASSESSMENT — PAIN DESCRIPTION - PAIN TYPE: TYPE: SURGICAL PAIN

## 2024-08-17 ASSESSMENT — PAIN - FUNCTIONAL ASSESSMENT
PAIN_FUNCTIONAL_ASSESSMENT: PREVENTS OR INTERFERES SOME ACTIVE ACTIVITIES AND ADLS
PAIN_FUNCTIONAL_ASSESSMENT: ACTIVITIES ARE NOT PREVENTED
PAIN_FUNCTIONAL_ASSESSMENT: ACTIVITIES ARE NOT PREVENTED
PAIN_FUNCTIONAL_ASSESSMENT: PREVENTS OR INTERFERES SOME ACTIVE ACTIVITIES AND ADLS
PAIN_FUNCTIONAL_ASSESSMENT: ACTIVITIES ARE NOT PREVENTED
PAIN_FUNCTIONAL_ASSESSMENT: ACTIVITIES ARE NOT PREVENTED

## 2024-08-17 ASSESSMENT — PAIN DESCRIPTION - FREQUENCY: FREQUENCY: INTERMITTENT

## 2024-08-17 NOTE — CONSULTS
[facility] ACUTE CARE PHYSICAL THERAPY EVALUATION  Mark Bourgeois, 1955, 2005/2005-A, 8/17/2024    History  [place]:  The primary encounter diagnosis was ASCVD (arteriosclerotic cardiovascular disease). A diagnosis of Abnormal nuclear stress test was also pertinent to this visit.  Patient  has a past medical history of Hypertension.  Patient  has a past surgical history that includes Vasectomy; Total knee arthroplasty (Right); Tympanostomy tube placement (Left); Umbilical hernia repair; sinus surgery; and Cardiac procedure (N/A, 8/14/2024).    Discharge Recommendation: [facility]    Equipment: no needs    Subjective:    Patient states:  \"I didn't think I'd be able to do this much.\"      Pain:  5/10 pin in chest at sx site.      Communication with other providers:  Handoff to RN, OT    Restrictions: sternal precautions, fall risk, chest tubes (x2), mejía    Home Setup/Prior level of function  Social/Functional History  Lives With: Daughter, Other (comment) (2 grandsons)  Type of Home: House  Home Layout: Multi-level, Performs ADL's on one level, Able to Live on Main level with bedroom/bathroom  Home Access: Stairs to enter with rails  Entrance Stairs - Number of Steps: 3-4  Home Equipment: None  Receives Help From: Other (comment) (n/a)  ADL Assistance: Independent  Homemaking Assistance: Independent  Ambulation Assistance: Independent  Transfer Assistance: Independent  Active : Yes    Examination of body systems (includes body structures/functions, activity/participation limitations):  Observation:  pt up in chair with RN present upon arrival and agreeable to therapy  Vision:  WFL  Hearing:  WFL  Cardiopulmonary:  6L O2, does not wear at baseline  Cognition: WFL, see OT/SLP note for further evaluation.    Musculoskeletal  ROM R/L:  WFL.    Strength R/L:  4+/5, minimal impairment in function and endurance.    Neuro:  WFL      Mobility:  Rolling L/R:  NT, pt up in chair at beginning and end of  session  Supine to sit:  NT, pt up in chair at beginning and end of session  Transfers: pt completed STS to/from chair CGA with cues for sequencing  Sitting balance:  good.    Standing balance:  fair+.    Gait: pt ambulated 120' with CW CGA with decreased nasim and decreased step length bilaterally. Cues provided for upright posture throughout  Education: pt educated on PT role, PT plan, and sternal precautions    [facility] 6 Clicks Inpatient Mobility:  AM-PAC Inpatient Mobility Raw Score : 17    Safety: patient left in chair with RN present, call light within reach, RN notified, gait belt used.    Assessment:  Pt is a 69 y.o. male admitted to the hospital for an abnormal nuclear stress test. Pt underwent a CABG x3 and left endoscopic greater saphenous vein harvest on 8/16/2024. Pt is typically independent with all ambulation and transfers without a device. Pt is currently performing transfers CGA and ambulating 120' with CW CGA. Pt is presenting with decreased endurance, impaired transfers, impaired gait, impaired strength, impaired balance. Pt would benefit from continued acute care PT as well as [facility] PT upon discharge to continue to address impairments.    Complexity: High    Prognosis: Good, no significant barriers to participation at this time.     General Plan: 6-7 times per week         Goals:  Short Term Goals  Time Frame for Short Term Goals: 1 week  Short Term Goal 1: pt to complete all bed mobility mod I  Short Term Goal 2: pt to complete all STS transfers to/from bed, commode, and chair mod I  Short Term Goal 3: pt to ambulate 200' with LRAD mod I  Short Term Goal 4: pt to ascend/descend 4 stairs with SBA to simulate home set up       Treatment plan:  Bed mobility, transfers, balance, gait, TA, TX    Recommendations for NURSING mobility: amb with gait belt and CW    Time:   Time in: 1044  Time out: 1104  Timed treatment minutes: 10  Total time: 20    Electronically signed by:    Chloé Wren,

## 2024-08-17 NOTE — CARE COORDINATION
CM reviewed pt's medical record and discussed in IDR.  CM reviewed cardiac rehab nurse note and pt education. Per cardiac rehab note, pt agreed to SNF or HHC at discharge if needed. Pt in to see pt to discuss discharge planning. PTA pt was independent with mobility and ADLs, and was an active ..Pt is agreeable to [facility] referral and wanted referral to go to [facility]. CM faxed Face Sheet, H & P,  Cardiology note, cardiothoracic surgery progress note, Surgeon OP note, and MAR. Will need C order.

## 2024-08-17 NOTE — PROGRESS NOTES
[facility] Cardiothoracic Surgery  Daily Progress Note    Surgeon:  Dr. Zavala      Subjective:  Mr. Bourgeois is a 69 y.o. year-old male status post CABGx3 (LIMA-LAD, SVG-OM1, SVG-PDA) on 8/16/2024.      Patient was seen and examined at bedside this morning without any complaints.    8/17/24: remove arterial line, keep cordis and chest tubes, start lasix 20mg IV BID and metoprolol 25mg PO BID, wean O2    Vital Signs: /65   Pulse 96   Temp 100 °F (37.8 °C) (Bladder)   Resp 26   Ht 1.778 m (5' 10\")   Wt (!) 141.5 kg (312 lb)   SpO2 92%   BMI 44.77 kg/m²  O2 Flow Rate (L/min): (S) 5 L/min   Admit Weight: Weight - Scale: 135.6 kg (299 lb)       I/O's:  I/O last 3 completed shifts:  In: 3577.1 [P.O.:360; I.V.:2067.1; Blood:800; IV Piggyback:350]  Out: 3445 [Urine:2480; Blood:500; Chest Tube:465]    Data:    CBC:   Recent Labs     08/16/24  1235 08/16/24  1314 08/16/24  1720 08/16/24 2040 08/17/24  0600   WBC 26.4*  --   --  11.5* 13.6*   HGB 12.3*   < > 11.7* 11.9* 11.7*   HCT 36.9*   < > 34.0* 35.9* 36.4*   MCV 87.4  --   --  86.7 87.3     --   --  160 168    < > = values in this interval not displayed.     BMP:   Recent Labs     08/16/24  0300 08/16/24  0835 08/16/24  1235 08/16/24  1314 08/16/24  1647 08/16/24  1720 08/16/24  2040 08/17/24  0600      < > 142   < > 147* 146*  --  141   K 3.7   < > 3.8   < > 4.2 4.3  --  3.8     --  114*  --   --   --   --  109   CO2 24   < > 21   < > 25 24  --  19*   PHOS  --   --  1.9*  --   --   --  2.8 3.5   BUN 19  --  16  --   --   --   --  15   CREATININE 0.9   < > 0.9   < > 0.7 0.7  --  0.9    < > = values in this interval not displayed.     PT/INR:   Recent Labs     08/15/24  0048 08/16/24  1235 08/17/24  0600   PROTIME 14.0 17.6* 15.7*   INR 1.0 1.4 1.2     APTT:   Recent Labs     08/15/24  1256 08/16/24  1235 08/17/24  0600   APTT 32.0 35.0 32.7         Scheduled Meds:    insulin lispro  0-4 Units

## 2024-08-17 NOTE — PROGRESS NOTES
Cardiology Progress Note     Admit Date:  8/14/2024    Consult reason/ Seen today for :       Subjective and  Overnight Events : Post CABG on 8/16/2024 with LIMA to LAD SVG to OM and SVG to PDA      Chief complain on admission : 69 y.o.year old who is admitted forNo chief complaint on file.     Assessment / Plan:  ASCVD: Post CABG continue supportive care as per primary ct surgery started on Plavix as per CT surgery  He is short of breath hypoxic consider starting diuresis  Wean off pressors add small dose beta-blockers metoprolol 25 twice daily  Echo showed preserved EF mildly enlarged left atrium right ventricle was dilated monitor closely  HTN: stable, continue To titrate up medication as needed  DVT Prophylaxis if no contraindication  Discussed with primary team, hospitalist service, bedside nursing staff and family  Past medical history:    has a past medical history of Hypertension.  Past surgical history:   has a past surgical history that includes Vasectomy; Total knee arthroplasty (Right); Tympanostomy tube placement (Left); Umbilical hernia repair; sinus surgery; and Cardiac procedure (N/A, 8/14/2024).  Social History:   reports that he has never smoked. He has never used smokeless tobacco. He reports that he does not drink alcohol and does not use drugs.  Family history:  family history includes Breast Cancer in his mother; Heart Failure in his father; Kidney Disease in his daughter; No Known Problems in his sister and sister.    No Known Allergies    Review of Systems:    All 14 systems were reviewed and are negative  Except for the positive findings  which as documented     BP (!) 133/59   Pulse 92   Temp 99.7 °F (37.6 °C) (Bladder)   Resp 24   Ht 1.778 m (5' 10\")   Wt (!) 141.5 kg (312 lb)   SpO2 90%   BMI 44.77 kg/m²     Intake/Output Summary (Last 24 hours) at 8/17/2024 1426  Last data filed at 8/17/2024 1246  Gross  heparin (porcine)  5,000 Units SubCUTAneous 3 times per day    [START ON 8/18/2024] bacitracin   Topical BID    acetaminophen  1,000 mg Oral 4 times per day    gabapentin  300 mg Oral TID      insulin 1.5 Units/hr (08/17/24 1415)    sodium chloride 50 mL/hr at 08/17/24 1245    sodium chloride      phenylephrine      niCARdipine      dextrose       sodium chloride flush, sodium chloride, ondansetron **OR** ondansetron, [START ON 8/19/2024] hydrALAZINE, bisacodyl, potassium chloride, calcium chloride 1,000 mg in sodium chloride 0.9 % 100 mL IVPB **OR** calcium chloride 2,000 mg in sodium chloride 0.9 % 100 mL IVPB, albumin human 5%, phenylephrine, niCARdipine, glucose, dextrose bolus **OR** dextrose bolus, glucagon (rDNA), dextrose, sodium phosphate 15 mmol in sodium chloride 0.9 % 250 mL IVPB, traMADol, HYDROmorphone    Lab Data:  CBC:   Recent Labs     08/16/24  1235 08/16/24  1314 08/16/24  1720 08/16/24 2040 08/17/24  0600   WBC 26.4*  --   --  11.5* 13.6*   HGB 12.3*   < > 11.7* 11.9* 11.7*   HCT 36.9*   < > 34.0* 35.9* 36.4*   MCV 87.4  --   --  86.7 87.3     --   --  160 168    < > = values in this interval not displayed.     BMP:   Recent Labs     08/16/24  0300 08/16/24  0835 08/16/24  1235 08/16/24  1314 08/16/24  1647 08/16/24  1720 08/16/24  2040 08/17/24  0600 08/17/24  1307      < > 142   < > 147* 146*  --  141  --    K 3.7   < > 3.8   < > 4.2 4.3  --  3.8 4.1     --  114*  --   --   --   --  109  --    CO2 24   < > 21   < > 25 24  --  19*  --    PHOS  --   --  1.9*  --   --   --  2.8 3.5  --    BUN 19  --  16  --   --   --   --  15  --    CREATININE 0.9   < > 0.9   < > 0.7 0.7  --  0.9  --     < > = values in this interval not displayed.     PT/INR:   Recent Labs     08/15/24  0048 08/16/24  1235 08/17/24  0600   PROTIME 14.0 17.6* 15.7*   INR 1.0 1.4 1.2     BNP:    Recent Labs     08/15/24  0048   PROBNP 125.0     TROPONIN: No results for input(s): \"TROPONINT\" in the last 72 hours.

## 2024-08-17 NOTE — PROGRESS NOTES
bilateral    Result Date: 8/15/2024  . PROCEDURE: BILATERAL CAROTID DOPPLER ULTRASOUND CLINICAL INDICATION: Male, 69 years old. Pre-op carotid evaluation TECHNIQUE: Grayscale spectral Doppler on analysis was performed of the cervical carotid and vertebral arteries bilaterally. Validated velocity measurements with angiographic measurements, velocity criteria or extrapolated from diameter data as defined by the society radiologist in ultrasound consensus conference radiology 2003; 229; 340-346. COMPARISON: NONE FINDINGS: RIGHT: Velocities are as follows: CCA PSV = 136/96 cm/s, ICA PSV = 67/75/62 cm/s, ECA PSV = 118 cm/s, ICA/CCA Ratio = 0.8 cm/s Right Vertebral Artery: 57. LEFT: Velocities are as follows: CCA PSV = 106/81 cm/s, ICA PSV = 126/53/38 cm/s, ECA PSV = 87 cm/s, ICA/CCA Ratio = 1.6 cm/s Left Vertebral Artery: 108.     1.  No hemodynamically significant carotid artery stenosis is seen. Electronically signed by Claudia Carranza MD    CT CHEST WO CONTRAST    Result Date: 8/15/2024  EXAM: CT of the chest without contrast HISTORY: preop CABG COMPARISON: May 31, 2016 TECHNIQUE: CT of the chest was performed without intravenous contrast medium and reconstructed into axial, coronal and sagittal reconstructions. The lack of intravenous contrast medium limits the evaluation for focal visceral lesions and intravascular pathology. One or more of the following dose-optimizing techniques was utilized for this exam: automated exposure control, adjustment of the mA and/or kV according to patient size, and/or use of iterative reconstruction technique FINDINGS: Normal heart size. No pericardial effusion. Coronary artery calcifications are seen. The trachea and mainstem bronchi patent bilaterally. There is a 3.7 x 2.4 cm nodule seen at the left paratracheal location. This may reflect a thyroid nodule versus a mediastinal adenopathy. Calcified subcarinal lymph nodes are seen. Thoracic aorta is nonaneurysmal. No pleural  11:48 AM   Result Value Ref Range    pH, Bld 7.39 7.35 - 7.45    pCO2, Arterial 35.4 35 - 48 MMHG    pO2, Arterial 76.8 (L) 83 - 108 MMHG    Base Exc, Mixed 3.0 0 - 3.0    Base Excess MINUS 0 - 3.0    HCO3, Arterial 21.5 21 - 28 MMOL/L    CO2 Content 22.5 21 - 32 MMOL/L    O2 Sat 95.2 94 - 98 %    Hematocrit 32.0 (L) 38 - 51 %    Hemoglobin 10.9 (L) 12 - 17 GM/DL    POC CALCIUM 1.27 1.15 - 1.33 MMOL/L    Sodium 146 (H) 135 - 145 MMOL/L    Potassium 4.1 3.5 - 5.1 MMOL/L    Source: Arterial     POC Glucose 135 (H) 70 - 99 MG/DL   POC CRITICAL CARE PROFILE    Collection Time: 08/16/24 12:33 PM   Result Value Ref Range    pH, Bld 7.29 (L) 7.35 - 7.45    pCO2, Arterial 42.6 35 - 48 MMHG    pO2, Arterial 86.5 83 - 108 MMHG    HCO3, Arterial 20.5 (L) 21 - 28 MMOL/L    Base Excess MINUS 0 - 3.0    Base Exc, Mixed 5.8 (H) 0 - 3.0    O2 Sat 95.3 94 - 98 %    Sodium 146 (H) 135 - 145 MMOL/L    Potassium 3.5 3.5 - 5.1 MMOL/L    POC Chloride 112 (H) 99 - 110 MMOL/L    CO2 22 21 - 32 MMOL/L    POC Glucose 145 (H) 70 - 99 MG/DL    POC Creatinine 0.9 0.5 - 1.2 MG/DL    eGFR, POC >90 >60 mL/min/1.73m2    POC CALCIUM 1.26 1.15 - 1.33 MMOL/L    Hematocrit 34.0 (L) 38 - 51 %    Hemoglobin 11.6 (L) 12 - 17 GM/DL    Source: Arterial    CBC    Collection Time: 08/16/24 12:35 PM   Result Value Ref Range    WBC 26.4 (H) 4.0 - 10.5 K/CU MM    RBC 4.22 (L) 4.6 - 6.2 M/CU MM    Hemoglobin 12.3 (L) 13.5 - 18.0 GM/DL    Hematocrit 36.9 (L) 42 - 52 %    MCV 87.4 78 - 100 FL    MCH 29.1 27 - 31 PG    MCHC 33.3 32.0 - 36.0 %    RDW 13.2 11.7 - 14.9 %    Platelets 204 140 - 440 K/CU MM    MPV 10.0 7.5 - 11.1 FL   Calcium, Ionized    Collection Time: 08/16/24 12:35 PM   Result Value Ref Range    Calcium, Ionized 1.22  4.88   1.12 - 1.32 mMOL/L   Critical Care Panel    Collection Time: 08/16/24 12:35 PM   Result Value Ref Range    Sodium 142 135 - 145 MMOL/L    Potassium 3.8 3.5 - 5.1 MMOL/L    Chloride 114 (H) 99 - 110 mMol/L    CO2 21 21 - 32 MMOL/L  Collection Time: 08/17/24 12:46 AM   Result Value Ref Range    POC Glucose 101 (H) 70 - 99 MG/DL   POCT Glucose    Collection Time: 08/17/24  1:57 AM   Result Value Ref Range    POC Glucose 99 70 - 99 MG/DL   POCT Glucose    Collection Time: 08/17/24  4:18 AM   Result Value Ref Range    POC Glucose 97 70 - 99 MG/DL   POCT Glucose    Collection Time: 08/17/24  5:52 AM   Result Value Ref Range    POC Glucose 100 (H) 70 - 99 MG/DL   Basic Metabolic Panel    Collection Time: 08/17/24  6:00 AM   Result Value Ref Range    Sodium 141 135 - 145 MMOL/L    Potassium 3.8 3.5 - 5.1 MMOL/L    Chloride 109 99 - 110 mMol/L    CO2 19 (L) 21 - 32 MMOL/L    Anion Gap 13 7 - 16    Glucose 106 (H) 70 - 99 MG/DL    BUN 15 6 - 23 MG/DL    Creatinine 0.9 0.9 - 1.3 MG/DL    Est, Glom Filt Rate >90 >60 mL/min/1.73m2    Calcium 8.4 8.3 - 10.6 MG/DL   CBC    Collection Time: 08/17/24  6:00 AM   Result Value Ref Range    WBC 13.6 (H) 4.0 - 10.5 K/CU MM    RBC 4.17 (L) 4.6 - 6.2 M/CU MM    Hemoglobin 11.7 (L) 13.5 - 18.0 GM/DL    Hematocrit 36.4 (L) 42 - 52 %    MCV 87.3 78 - 100 FL    MCH 28.1 27 - 31 PG    MCHC 32.1 32.0 - 36.0 %    RDW 13.5 11.7 - 14.9 %    Platelets 168 140 - 440 K/CU MM    MPV 9.9 7.5 - 11.1 FL   Protime-INR    Collection Time: 08/17/24  6:00 AM   Result Value Ref Range    Protime 15.7 (H) 11.7 - 14.5 SECONDS    INR 1.2 INDEX   APTT    Collection Time: 08/17/24  6:00 AM   Result Value Ref Range    aPTT 32.7 25.1 - 37.1 SECONDS   Fibrinogen    Collection Time: 08/17/24  6:00 AM   Result Value Ref Range    Fibrinogen 404 170 - 540 MG/DL   Calcium, Ionized    Collection Time: 08/17/24  6:00 AM   Result Value Ref Range    Calcium, Ionized 1.09  4.36   (L) 1.12 - 1.32 mMOL/L   Phosphorus    Collection Time: 08/17/24  6:00 AM   Result Value Ref Range    Phosphorus 3.5 2.5 - 4.9 MG/DL   Magnesium    Collection Time: 08/17/24  6:00 AM   Result Value Ref Range    Magnesium 2.2 1.8 - 2.4 mg/dl   POCT Glucose    Collection Time:

## 2024-08-17 NOTE — PROGRESS NOTES
V2.0  [place] Consult Note      Name:  Mark Bourgeois /Age/Sex: 1955  (69 y.o. male)   MRN & CSN:  [id number] & [id number] Encounter Date/Time: 2024 5:17 PM EDT   Location:  -A PCP: Hyacinth Garcia MD     Attending:Eddie Zavala MD  Consulting Provider: Lu Carver MD      Hospital Day: 4    Assessment and Recommendations   Mark Bourgeois is a 69 y.o. male who presents with Abnormal nuclear stress test    Recommendations:    # CAD s/p 3V CABG   -Left heart cath : Proximal LAD stenosis of 70 to 80%, first OM stenosis of 70 to 80%, 100% occluded possible  or 99% stenosis of ostial RCA, RCA filled by collaterals from the left, right subclavian artery is 100% occluded  -completed CT chest, BLE vein mapping and carotid duplex  -Echo 8/15: EF 55-60% indeterminate diastolic function, mod to severely dilated RV, mild TR  CT surgery primary  Cardiology following, appreciate recs  Aspirin and statin  Metoprolol succinate 200 mg nightly    #Lung nodule x2  -3.7 cm x 2.4 cm nodule at the L paratracheal location (thyroid nodule vs mediastinal adenopathy) and 8 mm nodule of RLL  CT and/or thyroid US as outpatient   Repeat CT chest in 6-12 months  to reassess RLL nodule    # HTN  Continue amlodipine    # Class III obesity  -BMI 41.72  -low HDL, A1c: 5.8%  Will need count counseling for weight reduction    # GERD  Continue famotidine      Diet ADULT DIET; Clear Liquid; 4 carb choices (60 gm/meal); Low Fat/Low Chol/High Fiber/2 gm Na   DVT Prophylaxis [] Lovenox, []  Heparin, [x] SCDs, [x] Ambulation,  [] Eliquis, [] Xarelto   Code Status Full Code   Surrogate Decision Maker/ POA  Jenna Tripathi     Subjective:     Patient was sleeping when seen.  On insulin drip      Review of Systems:    Review of Systems    As above    Objective:     Intake/Output Summary (Last 24 hours) at 2024 0758  Last data filed at 2024 0749  Gross per 24 hour   Intake 4888.76 ml   Output 2895 ml   Net .76  08/16/24  1314 08/16/24  1647 08/16/24  1720 08/17/24  0600      < > 142   < > 147* 146* 141   K 3.7   < > 3.8   < > 4.2 4.3 3.8     --  114*  --   --   --  109   CO2 24   < > 21   < > 25 24 19*   BUN 19  --  16  --   --   --  15   CREATININE 0.9   < > 0.9   < > 0.7 0.7 0.9   GLUCOSE 108*  --  140*  --   --   --  106*    < > = values in this interval not displayed.     Hepatic:   Recent Labs     08/15/24  0048 08/16/24  0300   AST 21 21   ALT 19 18   BILITOT 0.5 0.6   ALKPHOS 83 89     Lipids:   Lab Results   Component Value Date/Time    CHOL 117 08/14/2024 08:18 PM    HDL 27 08/14/2024 08:18 PM    TRIG 148 08/14/2024 08:18 PM     Hemoglobin A1C:   Lab Results   Component Value Date/Time    LABA1C 6.0 08/15/2024 12:56 PM     TSH:   Lab Results   Component Value Date/Time    TSH 2.33 07/06/2023 10:04 AM     Troponin: No results found for: \"TROPONINT\"  Lactic Acid: No results for input(s): \"LACTA\" in the last 72 hours.  BNP:   Recent Labs     08/15/24  0048   PROBNP 125.0     UA:  Lab Results   Component Value Date/Time    NITRU NEGATIVE 08/15/2024 02:00 PM    COLORU YELLOW 08/15/2024 02:00 PM    PHUR 5.5 08/15/2024 02:00 PM    WBCUA 1 08/15/2024 02:00 PM    RBCUA <1 08/15/2024 02:00 PM    MUCUS RARE 08/15/2024 02:00 PM    TRICHOMONAS NONE SEEN 08/15/2024 02:00 PM    BACTERIA NEGATIVE 08/15/2024 02:00 PM    CLARITYU CLEAR 08/15/2024 02:00 PM    LEUKOCYTESUR TRACE 08/15/2024 02:00 PM    UROBILINOGEN 0.2 08/15/2024 02:00 PM    BILIRUBINUR NEGATIVE 08/15/2024 02:00 PM    BLOODU NEGATIVE 08/15/2024 02:00 PM    GLUCOSEU NEGATIVE 08/15/2024 02:00 PM    KETUA NEGATIVE 08/15/2024 02:00 PM     Urine Cultures: No results found for: \"LABURIN\"  Blood Cultures: No results found for: \"BC\"  No results found for: \"BLOODCULT2\"  Organism: No results found for: \"ORG\"      Electronically signed by Lu Carver MD on 8/17/2024 at 7:58 AM

## 2024-08-17 NOTE — PROGRESS NOTES
Occupational Therapy  [facility] ACUTE CARE OCCUPATIONAL THERAPY EVALUATION    Mark Bourgeois, 1955, 2005/2005-A, 8/17/2024    Discharge Recommendation: home w/ assist prn + [facility]      History:  [place]:  The primary encounter diagnosis was ASCVD (arteriosclerotic cardiovascular disease). A diagnosis of Abnormal nuclear stress test was also pertinent to this visit.  Past Medical History:   Diagnosis Date    Hypertension        Subjective:  Patient states: \"I feel better than I thought I would\"  Pain:  5/10 pain sx site  Communication with other providers: co-eval w/ PT, handoff to RN  Restrictions: General Precautions, Fall Risk, sternal precautions, chest tubes x2    Home Setup/Prior level of function:  Social/Functional History  Lives With: Daughter, Other (comment) (2 grandsons)  Type of Home: House  Home Layout: Multi-level, Performs ADL's on one level, Able to Live on Main level with bedroom/bathroom  Home Access: Stairs to enter with rails  Entrance Stairs - Number of Steps: 3-4  Home Equipment: None  Receives Help From: Other (comment) (n/a)  ADL Assistance: Independent  Homemaking Assistance: Independent  Ambulation Assistance: Independent  Transfer Assistance: Independent  Active : Yes     Examination:  Observation: Seated in recliner upon arrival, agreeable to therapy eval.  Vision:  WFL  Hearing: WFL  Vitals: Stable vitals throughout session on 5L O2      Body Systems and functions:  ROM: WFL   Strength: B UE grossly 4+/5 across all major joints - not formally tested  Sensation: WFL  Tone: Normal  Coordination: WFL  Perception: WNL      Cognitive and Psychosocial Functioning:  Overall cognitive status: alert and oriented, WFL  Affect: Normal       Functional Mobility:  Bed mobility:  NT  Sitting balance:  SBA    Transfers: STS to/from recliner w/ CGA - SBA, Vcs for sequencing and sternal precautions  Standing balance:  CGA w/ CW  Functional Mobility: ambulated long functional household

## 2024-08-17 NOTE — PROGRESS NOTES
08/17/24 0725   Oxygen Therapy/Pulse Ox   O2 Therapy Oxygen humidified   $Oxygen $Daily Charge   O2 Device Heated high flow cannula   FiO2  30 %   Pulse 96   Respirations 29   SpO2 91 %   Humidification Source Heated wire   Humidification Temp 33   Humidification Temp Measured 33   Circuit Condensation Drained   Pulse Oximeter Device Mode Continuous   $Pulse Oximeter $Spot check (multiple/continuous)   Blood Gas  Performed? No

## 2024-08-18 ENCOUNTER — APPOINTMENT (OUTPATIENT)
Dept: GENERAL RADIOLOGY | Age: 69
DRG: 233 | End: 2024-08-18
Attending: INTERNAL MEDICINE
Payer: COMMERCIAL

## 2024-08-18 LAB
ANION GAP SERPL CALCULATED.3IONS-SCNC: 11 MMOL/L (ref 7–16)
APTT: 41.4 SECONDS (ref 25.1–37.1)
BUN SERPL-MCNC: 17 MG/DL (ref 6–23)
CALCIUM IONIZED: NORMAL MMOL/L (ref 1.12–1.32)
CALCIUM SERPL-MCNC: 8.1 MG/DL (ref 8.3–10.6)
CHLORIDE BLD-SCNC: 104 MMOL/L (ref 99–110)
CO2: 22 MMOL/L (ref 21–32)
CREAT SERPL-MCNC: 1 MG/DL (ref 0.9–1.3)
FIBRINOGEN: 597 MG/DL (ref 170–540)
GFR, ESTIMATED: 81 ML/MIN/1.73M2
GLUCOSE BLD-MCNC: 122 MG/DL (ref 70–99)
GLUCOSE BLD-MCNC: 139 MG/DL (ref 70–99)
GLUCOSE BLD-MCNC: 147 MG/DL (ref 70–99)
GLUCOSE SERPL-MCNC: 125 MG/DL (ref 70–99)
HCT VFR BLD CALC: 33.5 % (ref 42–52)
HEMOGLOBIN: 10.8 GM/DL (ref 13.5–18)
INR BLD: 1.4 INDEX
MAGNESIUM: 2 MG/DL (ref 1.8–2.4)
MCH RBC QN AUTO: 28.9 PG (ref 27–31)
MCHC RBC AUTO-ENTMCNC: 32.2 % (ref 32–36)
MCV RBC AUTO: 89.6 FL (ref 78–100)
PDW BLD-RTO: 13.7 % (ref 11.7–14.9)
PHOSPHORUS: 2.4 MG/DL (ref 2.5–4.9)
PLATELET # BLD: 158 K/CU MM (ref 140–440)
PMV BLD AUTO: 10.5 FL (ref 7.5–11.1)
POTASSIUM SERPL-SCNC: 4.3 MMOL/L (ref 3.5–5.1)
PROTHROMBIN TIME: 17.5 SECONDS (ref 11.7–14.5)
RBC # BLD: 3.74 M/CU MM (ref 4.6–6.2)
SODIUM BLD-SCNC: 137 MMOL/L (ref 135–145)
WBC # BLD: 11.9 K/CU MM (ref 4–10.5)

## 2024-08-18 PROCEDURE — 85384 FIBRINOGEN ACTIVITY: CPT

## 2024-08-18 PROCEDURE — 36569 INSJ PICC 5 YR+ W/O IMAGING: CPT

## 2024-08-18 PROCEDURE — 76937 US GUIDE VASCULAR ACCESS: CPT

## 2024-08-18 PROCEDURE — 6370000000 HC RX 637 (ALT 250 FOR IP): Performed by: PHYSICIAN ASSISTANT

## 2024-08-18 PROCEDURE — 2580000003 HC RX 258: Performed by: PHYSICIAN ASSISTANT

## 2024-08-18 PROCEDURE — 94761 N-INVAS EAR/PLS OXIMETRY MLT: CPT

## 2024-08-18 PROCEDURE — 85730 THROMBOPLASTIN TIME PARTIAL: CPT

## 2024-08-18 PROCEDURE — 6370000000 HC RX 637 (ALT 250 FOR IP): Performed by: NURSE PRACTITIONER

## 2024-08-18 PROCEDURE — 85610 PROTHROMBIN TIME: CPT

## 2024-08-18 PROCEDURE — 83735 ASSAY OF MAGNESIUM: CPT

## 2024-08-18 PROCEDURE — 2100000000 HC CCU R&B

## 2024-08-18 PROCEDURE — 82330 ASSAY OF CALCIUM: CPT

## 2024-08-18 PROCEDURE — 6360000002 HC RX W HCPCS: Performed by: PHYSICIAN ASSISTANT

## 2024-08-18 PROCEDURE — 94640 AIRWAY INHALATION TREATMENT: CPT

## 2024-08-18 PROCEDURE — 71045 X-RAY EXAM CHEST 1 VIEW: CPT

## 2024-08-18 PROCEDURE — C1751 CATH, INF, PER/CENT/MIDLINE: HCPCS

## 2024-08-18 PROCEDURE — 6370000000 HC RX 637 (ALT 250 FOR IP): Performed by: THORACIC SURGERY (CARDIOTHORACIC VASCULAR SURGERY)

## 2024-08-18 PROCEDURE — 84100 ASSAY OF PHOSPHORUS: CPT

## 2024-08-18 PROCEDURE — 2700000000 HC OXYGEN THERAPY PER DAY

## 2024-08-18 PROCEDURE — 6370000000 HC RX 637 (ALT 250 FOR IP): Performed by: SPECIALIST

## 2024-08-18 PROCEDURE — 94664 DEMO&/EVAL PT USE INHALER: CPT

## 2024-08-18 PROCEDURE — 94669 MECHANICAL CHEST WALL OSCILL: CPT

## 2024-08-18 PROCEDURE — 80048 BASIC METABOLIC PNL TOTAL CA: CPT

## 2024-08-18 PROCEDURE — 94150 VITAL CAPACITY TEST: CPT

## 2024-08-18 PROCEDURE — 2580000003 HC RX 258: Performed by: NURSE PRACTITIONER

## 2024-08-18 PROCEDURE — 6360000002 HC RX W HCPCS: Performed by: NURSE PRACTITIONER

## 2024-08-18 PROCEDURE — 85027 COMPLETE CBC AUTOMATED: CPT

## 2024-08-18 RX ORDER — SODIUM CHLORIDE 0.9 % (FLUSH) 0.9 %
5-40 SYRINGE (ML) INJECTION EVERY 12 HOURS SCHEDULED
Status: DISCONTINUED | OUTPATIENT
Start: 2024-08-18 | End: 2024-08-23 | Stop reason: HOSPADM

## 2024-08-18 RX ORDER — METOPROLOL TARTRATE 25 MG/1
37.5 TABLET, FILM COATED ORAL 2 TIMES DAILY
Status: DISCONTINUED | OUTPATIENT
Start: 2024-08-18 | End: 2024-08-19

## 2024-08-18 RX ORDER — GUAIFENESIN 600 MG/1
600 TABLET, EXTENDED RELEASE ORAL 2 TIMES DAILY
Status: DISCONTINUED | OUTPATIENT
Start: 2024-08-18 | End: 2024-08-23 | Stop reason: HOSPADM

## 2024-08-18 RX ORDER — MAGNESIUM SULFATE IN WATER 40 MG/ML
2000 INJECTION, SOLUTION INTRAVENOUS ONCE
Status: COMPLETED | OUTPATIENT
Start: 2024-08-18 | End: 2024-08-18

## 2024-08-18 RX ORDER — SODIUM CHLORIDE 9 MG/ML
INJECTION, SOLUTION INTRAVENOUS PRN
Status: DISCONTINUED | OUTPATIENT
Start: 2024-08-18 | End: 2024-08-19

## 2024-08-18 RX ORDER — IPRATROPIUM BROMIDE AND ALBUTEROL SULFATE 2.5; .5 MG/3ML; MG/3ML
1 SOLUTION RESPIRATORY (INHALATION) EVERY 4 HOURS PRN
Status: DISCONTINUED | OUTPATIENT
Start: 2024-08-18 | End: 2024-08-23 | Stop reason: HOSPADM

## 2024-08-18 RX ORDER — FUROSEMIDE 10 MG/ML
40 INJECTION INTRAMUSCULAR; INTRAVENOUS 2 TIMES DAILY
Status: DISCONTINUED | OUTPATIENT
Start: 2024-08-18 | End: 2024-08-23 | Stop reason: HOSPADM

## 2024-08-18 RX ORDER — SODIUM CHLORIDE 0.9 % (FLUSH) 0.9 %
5-40 SYRINGE (ML) INJECTION PRN
Status: DISCONTINUED | OUTPATIENT
Start: 2024-08-18 | End: 2024-08-23 | Stop reason: HOSPADM

## 2024-08-18 RX ADMIN — SODIUM CHLORIDE, PRESERVATIVE FREE 10 ML: 5 INJECTION INTRAVENOUS at 10:42

## 2024-08-18 RX ADMIN — SENNOSIDES AND DOCUSATE SODIUM 1 TABLET: 50; 8.6 TABLET ORAL at 21:32

## 2024-08-18 RX ADMIN — GABAPENTIN 300 MG: 300 CAPSULE ORAL at 21:29

## 2024-08-18 RX ADMIN — Medication 1 TABLET: at 10:35

## 2024-08-18 RX ADMIN — POTASSIUM CHLORIDE 10 MEQ: 1500 TABLET, EXTENDED RELEASE ORAL at 21:29

## 2024-08-18 RX ADMIN — CHLORHEXIDINE GLUCONATE 0.12% ORAL RINSE 15 ML: 1.2 LIQUID ORAL at 21:28

## 2024-08-18 RX ADMIN — IPRATROPIUM BROMIDE AND ALBUTEROL SULFATE 1 DOSE: 2.5; .5 SOLUTION RESPIRATORY (INHALATION) at 07:33

## 2024-08-18 RX ADMIN — SODIUM CHLORIDE 3000 MG: 900 INJECTION INTRAVENOUS at 00:04

## 2024-08-18 RX ADMIN — HEPARIN SODIUM 5000 UNITS: 5000 INJECTION INTRAVENOUS; SUBCUTANEOUS at 21:34

## 2024-08-18 RX ADMIN — POLYETHYLENE GLYCOL (3350) 17 G: 17 POWDER, FOR SOLUTION ORAL at 10:35

## 2024-08-18 RX ADMIN — FAMOTIDINE 20 MG: 20 TABLET ORAL at 21:31

## 2024-08-18 RX ADMIN — ASPIRIN 81 MG: 81 TABLET, CHEWABLE ORAL at 10:35

## 2024-08-18 RX ADMIN — GUAIFENESIN 600 MG: 600 TABLET, EXTENDED RELEASE ORAL at 21:29

## 2024-08-18 RX ADMIN — IPRATROPIUM BROMIDE AND ALBUTEROL SULFATE 1 DOSE: 2.5; .5 SOLUTION RESPIRATORY (INHALATION) at 11:16

## 2024-08-18 RX ADMIN — HEPARIN SODIUM 5000 UNITS: 5000 INJECTION INTRAVENOUS; SUBCUTANEOUS at 07:12

## 2024-08-18 RX ADMIN — BACITRACIN: 500 OINTMENT TOPICAL at 10:40

## 2024-08-18 RX ADMIN — ACETAMINOPHEN 1000 MG: 500 TABLET ORAL at 07:13

## 2024-08-18 RX ADMIN — AMIODARONE HYDROCHLORIDE 0.5 MG/MIN: 50 INJECTION, SOLUTION INTRAVENOUS at 11:00

## 2024-08-18 RX ADMIN — IPRATROPIUM BROMIDE AND ALBUTEROL SULFATE 1 DOSE: 2.5; .5 SOLUTION RESPIRATORY (INHALATION) at 14:17

## 2024-08-18 RX ADMIN — GABAPENTIN 300 MG: 300 CAPSULE ORAL at 18:26

## 2024-08-18 RX ADMIN — POTASSIUM CHLORIDE 10 MEQ: 1500 TABLET, EXTENDED RELEASE ORAL at 10:35

## 2024-08-18 RX ADMIN — FUROSEMIDE 40 MG: 10 INJECTION, SOLUTION INTRAMUSCULAR; INTRAVENOUS at 10:34

## 2024-08-18 RX ADMIN — ACETAMINOPHEN 1000 MG: 500 TABLET ORAL at 00:03

## 2024-08-18 RX ADMIN — SENNOSIDES AND DOCUSATE SODIUM 1 TABLET: 50; 8.6 TABLET ORAL at 10:40

## 2024-08-18 RX ADMIN — SODIUM CHLORIDE, PRESERVATIVE FREE 10 ML: 5 INJECTION INTRAVENOUS at 21:36

## 2024-08-18 RX ADMIN — CHLORHEXIDINE GLUCONATE 0.12% ORAL RINSE 15 ML: 1.2 LIQUID ORAL at 10:34

## 2024-08-18 RX ADMIN — SODIUM CHLORIDE, PRESERVATIVE FREE 10 ML: 5 INJECTION INTRAVENOUS at 09:07

## 2024-08-18 RX ADMIN — SODIUM CHLORIDE, PRESERVATIVE FREE 10 ML: 5 INJECTION INTRAVENOUS at 21:37

## 2024-08-18 RX ADMIN — METOPROLOL TARTRATE 37.5 MG: 25 TABLET, FILM COATED ORAL at 21:29

## 2024-08-18 RX ADMIN — MUPIROCIN: 20 OINTMENT TOPICAL at 10:34

## 2024-08-18 RX ADMIN — FUROSEMIDE 40 MG: 10 INJECTION, SOLUTION INTRAMUSCULAR; INTRAVENOUS at 18:25

## 2024-08-18 RX ADMIN — ATORVASTATIN CALCIUM 40 MG: 40 TABLET, FILM COATED ORAL at 21:32

## 2024-08-18 RX ADMIN — MUPIROCIN: 20 OINTMENT TOPICAL at 21:40

## 2024-08-18 RX ADMIN — FAMOTIDINE 20 MG: 20 TABLET ORAL at 10:35

## 2024-08-18 RX ADMIN — TAMSULOSIN HYDROCHLORIDE 0.4 MG: 0.4 CAPSULE ORAL at 10:35

## 2024-08-18 RX ADMIN — CLOPIDOGREL BISULFATE 75 MG: 75 TABLET ORAL at 10:35

## 2024-08-18 RX ADMIN — BACITRACIN: 500 OINTMENT TOPICAL at 21:38

## 2024-08-18 RX ADMIN — MAGNESIUM SULFATE HEPTAHYDRATE 2000 MG: 40 INJECTION, SOLUTION INTRAVENOUS at 11:21

## 2024-08-18 RX ADMIN — HEPARIN SODIUM 5000 UNITS: 5000 INJECTION INTRAVENOUS; SUBCUTANEOUS at 18:25

## 2024-08-18 RX ADMIN — ACETAMINOPHEN 1000 MG: 500 TABLET ORAL at 18:24

## 2024-08-18 RX ADMIN — GABAPENTIN 300 MG: 300 CAPSULE ORAL at 10:34

## 2024-08-18 RX ADMIN — METOPROLOL TARTRATE 37.5 MG: 25 TABLET, FILM COATED ORAL at 11:03

## 2024-08-18 ASSESSMENT — PAIN SCALES - GENERAL: PAINLEVEL_OUTOF10: 0

## 2024-08-18 NOTE — PROGRESS NOTES
Cardiology Progress Note     Admit Date:  8/14/2024    Consult reason/ Seen today for :       Subjective and  Overnight Events : Post CABG on 8/16/2024 with LIMA to LAD SVG to OM and SVG to PDA  Still quite swollen complaining of leg swelling but was able to walk    Chief complain on admission : 69 y.o.year old who is admitted forNo chief complaint on file.     Assessment / Plan:  ASCVD: Post CABG continue supportive care as per primary ct surgery started on Plavix as per CT surgery  He is short of breath hypoxic consider starting diuresis  Wean off pressors add small dose beta-blockers metoprolol 25 twice daily  Echo showed preserved EF mildly enlarged left atrium right ventricle was dilated monitor closely  HTN: stable, continue To titrate up medication as needed  DVT Prophylaxis if no contraindication  Discussed with primary team, hospitalist service, bedside nursing staff and family  Past medical history:    has a past medical history of Hypertension.  Past surgical history:   has a past surgical history that includes Vasectomy; Total knee arthroplasty (Right); Tympanostomy tube placement (Left); Umbilical hernia repair; sinus surgery; and Cardiac procedure (N/A, 8/14/2024).  Social History:   reports that he has never smoked. He has never used smokeless tobacco. He reports that he does not drink alcohol and does not use drugs.  Family history:  family history includes Breast Cancer in his mother; Heart Failure in his father; Kidney Disease in his daughter; No Known Problems in his sister and sister.    No Known Allergies    Review of Systems:    All 14 systems were reviewed and are negative  Except for the positive findings  which as documented     BP (!) 140/63   Pulse (!) 111   Temp 99.6 °F (37.6 °C) (Oral)   Resp 19   Ht 1.778 m (5' 10\")   Wt (!) 143.8 kg (317 lb)   SpO2 92%   BMI 45.48 kg/m²     Intake/Output Summary (Last  24 hours) at 8/18/2024 1741  Last data filed at 8/18/2024 1143  Gross per 24 hour   Intake 1515.57 ml   Output 2640 ml   Net -1124.43 ml     Physical Exam:  Constitutional:  Well developed, Well nourished, No acute distress, Non-toxic appearance.   HENT:  Normocephalic, Atraumatic, Bilateral external ears normal, Oropharynx moist, No oral exudates, Nose normal. Neck-  Supple, No stridor.   Eyes:  PERRL, EOMI, Conjunctiva normal, No discharge.   Respiratory: Post chest tube post CABG normal breath sounds, No respiratory distress, No wheezing, No chest tenderness.   Cardiovascular:  Normal heart rate, Normal rhythm, No murmurs, No rubs, No gallops, JVP not elevated  Abdomen/GI:  Bowel sounds normal, Soft, No tenderness, No masses, No pulsatile masses.     Musculoskeletal:  No edema, No tenderness, No cyanosis, No clubbing. Good range of motion in all major joints. No tenderness to palpation   Integument:  Warm, Dry, No erythema, No rash.   Lymphatic:  No lymphadenopathy noted.   Neurologic:  Alert & oriented x 3, Normal motor function, Normal sensory function, No focal deficits noted.   Psychiatric:  Affect  and  Mood :no change    Medications:    furosemide  40 mg IntraVENous BID    sodium chloride flush  5-40 mL IntraVENous 2 times per day    metoprolol tartrate  37.5 mg Oral BID    insulin lispro  0-4 Units SubCUTAneous TID WC    insulin lispro  0-4 Units SubCUTAneous Nightly    potassium chloride  10 mEq Oral BID    tamsulosin  0.4 mg Oral Daily    sodium chloride flush  5-40 mL IntraVENous 2 times per day    aspirin  81 mg Oral Daily    clopidogrel  75 mg Oral Daily    chlorhexidine  15 mL Mouth/Throat BID    mupirocin   Each Nostril BID    multivitamin  1 tablet Oral Daily with breakfast    polyethylene glycol  17 g Oral Daily    sennosides-docusate sodium  1 tablet Oral BID    atorvastatin  40 mg Oral Nightly    famotidine  20 mg Oral BID    Or    famotidine (PEPCID) injection  20 mg IntraVENous BID

## 2024-08-18 NOTE — CONSULTS
PICC consult completed.     Indication for line: Irritant/vesicant medication  Medication/Anticipated Duration: Amiodarone  Ordering Provider: ASHLI De Anda    PICC LINE STATUS: PICC LINE READY TO USE    PICC line insertion education provided to patient, risks and benefits discussed and reviewed with patient. Patient verbalized understanding, questions answered. Consent signed by patient. Time out done @ 1510. Arrowg+david Blue Advance ® Triple lumen PICC line inserted into RUE basilic vein x 1 attempt using sterile ultrasound and modified Seldinger technique without difficulty per protocol.Correct PICC placement in the lower 1/3 of the SVC-CAJ verified with the Arrow® VPS G4™ vascular positioning system device.  Brisk blood return noted and flushes without resistance on its lumens. Patient tolerated procedure well. Ultrasound photos of selected vein diameter provided below. Printed copy of Blue Bullseye confirming correct PICC line placement placed in chart. Primary nurse Lucila notified and aware.    Consult the Vascular Access Team for questions, concerns, or change in patient's condition.          no precautions noted

## 2024-08-18 NOTE — PROGRESS NOTES
[facility] Cardiothoracic Surgery  Daily Progress Note    Surgeon:  Dr. Zavala      Subjective:  Mr. Bourgeois is a 69 y.o. year-old male status post CABGx3 (LIMA-LAD, SVG-OM1, SVG-PDA) on 8/16/2024.      Patient was seen and examined at bedside this morning without any complaints.    8/17/24: remove arterial line, keep cordis and chest tubes, start lasix 20mg IV BID and metoprolol 25mg PO BID, wean O2    8/18/24: place PICC and remove cordis, increase lasix to 40mg IV BID d/t weight up and edematous on exam, start amio gtt d/t tachycardia in 120's with concern for conversion to afib, remove mejía, possibly remove pleural chest tube later this morning if output minimal, increase metoprolol to 37.5mg    Vital Signs: /66   Pulse (!) 126   Temp (!) 100.6 °F (38.1 °C) (Bladder)   Resp 20   Ht 1.778 m (5' 10\")   Wt (!) 143.8 kg (317 lb)   SpO2 (!) 87%   BMI 45.48 kg/m²  O2 Flow Rate (L/min): 5 L/min   Admit Weight: Weight - Scale: 135.6 kg (299 lb)       I/O's:  I/O last 3 completed shifts:  In: 3222.9 [P.O.:1200; I.V.:909.9; IV Piggyback:1113.1]  Out: 4670 [Urine:3975; Chest Tube:695]    Data:    CBC:   Recent Labs     08/16/24 2040 08/17/24  0600 08/18/24  0605   WBC 11.5* 13.6* 11.9*   HGB 11.9* 11.7* 10.8*   HCT 35.9* 36.4* 33.5*   MCV 86.7 87.3 89.6    168 158     BMP:   Recent Labs     08/16/24  1235 08/16/24  1314 08/16/24  1720 08/16/24  2040 08/17/24  0600 08/17/24  1307 08/17/24  1647 08/18/24  0605      < > 146*  --  141  --   --  137   K 3.8   < > 4.3  --  3.8 4.1 4.2 4.3   *  --   --   --  109  --   --  104   CO2 21   < > 24  --  19*  --   --  22   PHOS 1.9*  --   --  2.8 3.5  --   --  2.4*   BUN 16  --   --   --  15  --   --  17   CREATININE 0.9   < > 0.7  --  0.9  --   --  1.0    < > = values in this interval not displayed.     PT/INR:   Recent Labs     08/16/24  1235 08/17/24  0600 08/18/24  0605   PROTIME 17.6* 15.7* 17.5*   INR

## 2024-08-18 NOTE — PROGRESS NOTES
Pt continued with minimal drainage from pleural tube after walking in the halls  Removed pleural tube without incident, tolerated well by the pt and no crepitus or air leak at the site after removal.  Pt noted fairly immediate improvement in breathing  Monitor respiratory effort, check CXR if pt develops significant dyspnea

## 2024-08-18 NOTE — PROGRESS NOTES
Inpatient critical care progress note 8/18/2024        Mark Bourgeois  1955  [id number]      Assessment/Plan:    S/P CABG, preserved EF  Hypoxemia postop secondary to atelectasis  HTN per history  GERD per history  Pulmonary nodule (solitary, well circumscribed, non calcified, right lower lobe location) per history  Obesity, suspected sleep apnea        Downward titration of supplemental oxygen, saturation goal 92-94% via pulse oximetry  Postoperative pulmonary hygiene measures  Glycemic control, transition off insulin infusion today  Hemodynamic support per cardiothoracic surgical service  Ulcer, DVT prophylaxis  Empiric nocturnal CPAP (either recommend outpatient formal sleep evaluation)     I suggest a follow-up CT scan of the chest in approximately 6 months given the patient's age (otherwise he has no significant malignancy risk factors).        Complex decisions required for evaluation management reviewed during critical care rounds        I have reviewed all pertinent laboratory data imaging studies    Subjective:    No acute overnight events.  The patient was successfully extubated to high flow supplemental oxygen 8/16/2024 at 1754 hrs.    Given moderate flow O2 requirement, patient did not utilize CPAP overnight.    Acceptable hemodynamics rhythm and urine output.      Review of Systems   Constitutional:  Positive for fatigue.   HENT: Negative.     Eyes: Negative.    Respiratory:  Positive for apnea and chest tightness.    Cardiovascular:  Positive for leg swelling.   Gastrointestinal:  Positive for abdominal distention.   Endocrine: Negative.    Genitourinary: Negative.    Musculoskeletal: Negative.    Skin: Negative.    Allergic/Immunologic: Negative.    Neurological: Negative.    Hematological: Negative.    Psychiatric/Behavioral: Negative.              Physical Exam:          /66   Pulse (!) 126   Temp (!) 100.6 °F (38.1 °C) (Bladder)   Resp 20   Ht 1.778 m (5' 10\")   Wt (!) 143.8 kg

## 2024-08-18 NOTE — PROGRESS NOTES
chloride flush  5-40 mL IntraVENous 2 times per day    aspirin  81 mg Oral Daily    clopidogrel  75 mg Oral Daily    chlorhexidine  15 mL Mouth/Throat BID    mupirocin   Each Nostril BID    multivitamin  1 tablet Oral Daily with breakfast    polyethylene glycol  17 g Oral Daily    sennosides-docusate sodium  1 tablet Oral BID    atorvastatin  40 mg Oral Nightly    famotidine  20 mg Oral BID    Or    famotidine (PEPCID) injection  20 mg IntraVENous BID    ipratropium 0.5 mg-albuterol 2.5 mg  1 Dose Inhalation Q4H WA RT    heparin (porcine)  5,000 Units SubCUTAneous 3 times per day    bacitracin   Topical BID    acetaminophen  1,000 mg Oral 4 times per day    gabapentin  300 mg Oral TID      Infusions:    sodium chloride 50 mL/hr at 08/17/24 1245    sodium chloride      phenylephrine      niCARdipine      dextrose       PRN Meds: sodium chloride flush, 5-40 mL, PRN  sodium chloride, , PRN  ondansetron, 4 mg, Q8H PRN   Or  ondansetron, 4 mg, Q6H PRN  [START ON 8/19/2024] hydrALAZINE, 20 mg, Q6H PRN  bisacodyl, 10 mg, Daily PRN  potassium chloride, 20 mEq, PRN  calcium chloride 1,000 mg in sodium chloride 0.9 % 100 mL IVPB, 1,000 mg, PRN   Or  calcium chloride 2,000 mg in sodium chloride 0.9 % 100 mL IVPB, 2,000 mg, PRN  albumin human 5%, 12.5 g, PRN  phenylephrine,  mcg/min, Continuous PRN  niCARdipine, 2.5-15 mg/hr, Continuous PRN  glucose, 4 tablet, PRN  dextrose bolus, 125 mL, PRN   Or  dextrose bolus, 250 mL, PRN  glucagon (rDNA), 1 mg, PRN  dextrose, , Continuous PRN  sodium phosphate 15 mmol in sodium chloride 0.9 % 250 mL IVPB, 15 mmol, PRN  traMADol, 50 mg, Q6H PRN  HYDROmorphone, 0.5 mg, Q4H PRN        Labs and Imaging   Cardiac procedure    Result Date: 8/14/2024  Derek Scott MD  [facility] Cardiac Catheterization Procedure Note Mark Bourgeois 69 y.o., male 1955 8/14/2024 Procedure:  1. Left Heart Catheterization Selective Coronary Angiography 2.. Radial artery access Indication:                  Proximal LAD is diffusely diseased is about 6070% long lesion distally it is a type III vessel and gives collateral to the right coronary artery Circumflex artery has no significant disease.Om branch further bifurcates , the OM 1 has a 70 to 80% proximal stenosis The right coronary artery is a dominant vessel , the ostial RCA is 99% stenosed and may be a chronic total occlusion as distally RCA is a large vessel it is filled by collaterals from the left side After reviewing the anatomy we decided to proceed with RFR of the LAD XB 3 oh guide was used to engage the left main RFR wire was equalized and advanced into the LAD.  RFR was calculated X 3 to be 0.84 x 0.83 and 0.84 Impression: 70 to 80% proximal LAD stenosis 70 to 80% first OM stenosis 100% occluded possible  or 99% stenosis of the ostial RCA RCA is filled by collaterals from the left side RFR of the LAD is 0.84 Right subclavian artery is 100% occluded  Recommendations: Surgical evaluation for CABG Aggressive risk and lifestyle modification Findings are reviewed and discussed  with patient and family. Questions are answered.  Post procedure activity restrictions and continued risk modification and follow up recommended. Derek Scott MD, 8/14/2024 4:11 PM     XR CHEST (2 VW)    Result Date: 8/9/2024  EXAMINATION: XR CHEST (2 VW) EXAM DATE: 8/9/2024 11:10 AM INDICATION: Encounter for preprocedural cardiovascular examination COMPARISON: May 9, 2010 TECHNIQUE:  Frontal and lateral views of the chest. FINDINGS: The lungs are clear.  No pleural effusion or pneumothorax. The cardiomediastinal silhouette is unremarkable. No acute osseous or soft tissue abnormality.     1.  No acute cardiopulmonary process. Electronically signed by Linda Vizcarra      CBC:   Recent Labs     08/16/24  2040 08/17/24  0600 08/18/24  0605   WBC 11.5* 13.6* 11.9*   HGB 11.9* 11.7* 10.8*    168 158     BMP:    Recent Labs     08/16/24  1235 08/16/24  1314 08/16/24  1720  08/17/24  0600 08/17/24  1307 08/17/24  1647 08/18/24  0605      < > 146* 141  --   --  137   K 3.8   < > 4.3 3.8 4.1 4.2 4.3   *  --   --  109  --   --  104   CO2 21   < > 24 19*  --   --  22   BUN 16  --   --  15  --   --  17   CREATININE 0.9   < > 0.7 0.9  --   --  1.0   GLUCOSE 140*  --   --  106*  --   --  125*    < > = values in this interval not displayed.     Hepatic:   Recent Labs     08/16/24  0300   AST 21   ALT 18   BILITOT 0.6   ALKPHOS 89     Lipids:   Lab Results   Component Value Date/Time    CHOL 117 08/14/2024 08:18 PM    HDL 27 08/14/2024 08:18 PM    TRIG 148 08/14/2024 08:18 PM     Hemoglobin A1C:   Lab Results   Component Value Date/Time    LABA1C 6.0 08/15/2024 12:56 PM     TSH:   Lab Results   Component Value Date/Time    TSH 2.33 07/06/2023 10:04 AM     Troponin: No results found for: \"TROPONINT\"  Lactic Acid: No results for input(s): \"LACTA\" in the last 72 hours.  BNP:   No results for input(s): \"PROBNP\" in the last 72 hours.    UA:  Lab Results   Component Value Date/Time    NITRU NEGATIVE 08/15/2024 02:00 PM    COLORU YELLOW 08/15/2024 02:00 PM    PHUR 5.5 08/15/2024 02:00 PM    WBCUA 1 08/15/2024 02:00 PM    RBCUA <1 08/15/2024 02:00 PM    MUCUS RARE 08/15/2024 02:00 PM    TRICHOMONAS NONE SEEN 08/15/2024 02:00 PM    BACTERIA NEGATIVE 08/15/2024 02:00 PM    CLARITYU CLEAR 08/15/2024 02:00 PM    LEUKOCYTESUR TRACE 08/15/2024 02:00 PM    UROBILINOGEN 0.2 08/15/2024 02:00 PM    BILIRUBINUR NEGATIVE 08/15/2024 02:00 PM    BLOODU NEGATIVE 08/15/2024 02:00 PM    GLUCOSEU NEGATIVE 08/15/2024 02:00 PM    KETUA NEGATIVE 08/15/2024 02:00 PM     Urine Cultures: No results found for: \"LABURIN\"  Blood Cultures: No results found for: \"BC\"  No results found for: \"BLOODCULT2\"  Organism: No results found for: \"ORG\"      Electronically signed by Lu Carver MD on 8/18/2024 at 8:23 AM

## 2024-08-19 ENCOUNTER — APPOINTMENT (OUTPATIENT)
Dept: GENERAL RADIOLOGY | Age: 69
DRG: 233 | End: 2024-08-19
Attending: INTERNAL MEDICINE
Payer: COMMERCIAL

## 2024-08-19 LAB
ABO/RH: NORMAL
ANION GAP SERPL CALCULATED.3IONS-SCNC: 10 MMOL/L (ref 7–16)
ANTIBODY SCREEN: NEGATIVE
APTT: 38.9 SECONDS (ref 25.1–37.1)
BUN SERPL-MCNC: 18 MG/DL (ref 6–23)
CALCIUM IONIZED: ABNORMAL MMOL/L (ref 1.12–1.32)
CALCIUM SERPL-MCNC: 8 MG/DL (ref 8.3–10.6)
CHLORIDE BLD-SCNC: 105 MMOL/L (ref 99–110)
CO2: 26 MMOL/L (ref 21–32)
COMPONENT: NORMAL
COMPONENT: NORMAL
CREAT SERPL-MCNC: 0.8 MG/DL (ref 0.9–1.3)
CROSSMATCH RESULT: NORMAL
CROSSMATCH RESULT: NORMAL
FIBRINOGEN: 640 MG/DL (ref 170–540)
GFR, ESTIMATED: >90 ML/MIN/1.73M2
GLUCOSE BLD-MCNC: 119 MG/DL (ref 70–99)
GLUCOSE BLD-MCNC: 120 MG/DL (ref 70–99)
GLUCOSE BLD-MCNC: 134 MG/DL (ref 70–99)
GLUCOSE BLD-MCNC: 158 MG/DL (ref 70–99)
GLUCOSE SERPL-MCNC: 129 MG/DL (ref 70–99)
HCT VFR BLD CALC: 32.5 % (ref 42–52)
HEMOGLOBIN: 10.4 GM/DL (ref 13.5–18)
INR BLD: 1.4 INDEX
MAGNESIUM: 2.2 MG/DL (ref 1.8–2.4)
MCH RBC QN AUTO: 28.7 PG (ref 27–31)
MCHC RBC AUTO-ENTMCNC: 32 % (ref 32–36)
MCV RBC AUTO: 89.8 FL (ref 78–100)
PDW BLD-RTO: 13.7 % (ref 11.7–14.9)
PHOSPHORUS: 2 MG/DL (ref 2.5–4.9)
PLATELET # BLD: 169 K/CU MM (ref 140–440)
PMV BLD AUTO: 10.3 FL (ref 7.5–11.1)
POTASSIUM SERPL-SCNC: 4 MMOL/L (ref 3.5–5.1)
PROTHROMBIN TIME: 17.2 SECONDS (ref 11.7–14.5)
RBC # BLD: 3.62 M/CU MM (ref 4.6–6.2)
SODIUM BLD-SCNC: 141 MMOL/L (ref 135–145)
STATUS: NORMAL
STATUS: NORMAL
THERMAL AMPLITUDE STUDY: NORMAL
TRANSFUSION STATUS: NORMAL
TRANSFUSION STATUS: NORMAL
UNIT DIVISION: 0
UNIT DIVISION: 0
UNIT NUMBER: NORMAL
UNIT NUMBER: NORMAL
WBC # BLD: 9.7 K/CU MM (ref 4–10.5)

## 2024-08-19 PROCEDURE — 85384 FIBRINOGEN ACTIVITY: CPT

## 2024-08-19 PROCEDURE — 2580000003 HC RX 258: Performed by: NURSE PRACTITIONER

## 2024-08-19 PROCEDURE — 82962 GLUCOSE BLOOD TEST: CPT

## 2024-08-19 PROCEDURE — 94640 AIRWAY INHALATION TREATMENT: CPT

## 2024-08-19 PROCEDURE — 6370000000 HC RX 637 (ALT 250 FOR IP): Performed by: PHYSICIAN ASSISTANT

## 2024-08-19 PROCEDURE — 83735 ASSAY OF MAGNESIUM: CPT

## 2024-08-19 PROCEDURE — 71046 X-RAY EXAM CHEST 2 VIEWS: CPT

## 2024-08-19 PROCEDURE — 6360000002 HC RX W HCPCS: Performed by: PHYSICIAN ASSISTANT

## 2024-08-19 PROCEDURE — 6360000002 HC RX W HCPCS: Performed by: NURSE PRACTITIONER

## 2024-08-19 PROCEDURE — 6370000000 HC RX 637 (ALT 250 FOR IP): Performed by: SPECIALIST

## 2024-08-19 PROCEDURE — 6370000000 HC RX 637 (ALT 250 FOR IP): Performed by: NURSE PRACTITIONER

## 2024-08-19 PROCEDURE — 85610 PROTHROMBIN TIME: CPT

## 2024-08-19 PROCEDURE — 94761 N-INVAS EAR/PLS OXIMETRY MLT: CPT

## 2024-08-19 PROCEDURE — 80048 BASIC METABOLIC PNL TOTAL CA: CPT

## 2024-08-19 PROCEDURE — 85730 THROMBOPLASTIN TIME PARTIAL: CPT

## 2024-08-19 PROCEDURE — 2700000000 HC OXYGEN THERAPY PER DAY

## 2024-08-19 PROCEDURE — 2580000003 HC RX 258: Performed by: PHYSICIAN ASSISTANT

## 2024-08-19 PROCEDURE — 97530 THERAPEUTIC ACTIVITIES: CPT

## 2024-08-19 PROCEDURE — 2500000003 HC RX 250 WO HCPCS: Performed by: PHYSICIAN ASSISTANT

## 2024-08-19 PROCEDURE — 2060000000 HC ICU INTERMEDIATE R&B

## 2024-08-19 PROCEDURE — 94669 MECHANICAL CHEST WALL OSCILL: CPT

## 2024-08-19 PROCEDURE — 6370000000 HC RX 637 (ALT 250 FOR IP): Performed by: THORACIC SURGERY (CARDIOTHORACIC VASCULAR SURGERY)

## 2024-08-19 PROCEDURE — 85027 COMPLETE CBC AUTOMATED: CPT

## 2024-08-19 PROCEDURE — 84100 ASSAY OF PHOSPHORUS: CPT

## 2024-08-19 PROCEDURE — 82330 ASSAY OF CALCIUM: CPT

## 2024-08-19 RX ORDER — METOPROLOL TARTRATE 50 MG
50 TABLET ORAL 2 TIMES DAILY
Status: DISCONTINUED | OUTPATIENT
Start: 2024-08-19 | End: 2024-08-20

## 2024-08-19 RX ORDER — SODIUM CHLORIDE FOR INHALATION 0.9 %
3 VIAL, NEBULIZER (ML) INHALATION
Status: DISCONTINUED | OUTPATIENT
Start: 2024-08-19 | End: 2024-08-23 | Stop reason: HOSPADM

## 2024-08-19 RX ADMIN — POTASSIUM CHLORIDE 10 MEQ: 1500 TABLET, EXTENDED RELEASE ORAL at 20:57

## 2024-08-19 RX ADMIN — CALCIUM CHLORIDE 1000 MG: 100 INJECTION, SOLUTION INTRAVENOUS at 09:26

## 2024-08-19 RX ADMIN — ACETAMINOPHEN 1000 MG: 500 TABLET ORAL at 23:31

## 2024-08-19 RX ADMIN — GABAPENTIN 300 MG: 300 CAPSULE ORAL at 14:30

## 2024-08-19 RX ADMIN — ATORVASTATIN CALCIUM 40 MG: 40 TABLET, FILM COATED ORAL at 20:58

## 2024-08-19 RX ADMIN — SENNOSIDES AND DOCUSATE SODIUM 1 TABLET: 50; 8.6 TABLET ORAL at 09:10

## 2024-08-19 RX ADMIN — METOPROLOL TARTRATE 50 MG: 50 TABLET, FILM COATED ORAL at 09:11

## 2024-08-19 RX ADMIN — SODIUM CHLORIDE, PRESERVATIVE FREE 10 ML: 5 INJECTION INTRAVENOUS at 21:02

## 2024-08-19 RX ADMIN — TAMSULOSIN HYDROCHLORIDE 0.4 MG: 0.4 CAPSULE ORAL at 09:11

## 2024-08-19 RX ADMIN — GUAIFENESIN 600 MG: 600 TABLET, EXTENDED RELEASE ORAL at 20:57

## 2024-08-19 RX ADMIN — CLOPIDOGREL BISULFATE 75 MG: 75 TABLET ORAL at 09:11

## 2024-08-19 RX ADMIN — ACETAMINOPHEN 1000 MG: 500 TABLET ORAL at 07:01

## 2024-08-19 RX ADMIN — ACETAMINOPHEN 1000 MG: 500 TABLET ORAL at 00:41

## 2024-08-19 RX ADMIN — FAMOTIDINE 20 MG: 20 TABLET ORAL at 20:58

## 2024-08-19 RX ADMIN — MUPIROCIN: 20 OINTMENT TOPICAL at 09:00

## 2024-08-19 RX ADMIN — GUAIFENESIN 600 MG: 600 TABLET, EXTENDED RELEASE ORAL at 09:10

## 2024-08-19 RX ADMIN — FUROSEMIDE 40 MG: 10 INJECTION, SOLUTION INTRAMUSCULAR; INTRAVENOUS at 09:10

## 2024-08-19 RX ADMIN — ASPIRIN 81 MG: 81 TABLET, CHEWABLE ORAL at 09:10

## 2024-08-19 RX ADMIN — SODIUM PHOSPHATE, MONOBASIC, MONOHYDRATE AND SODIUM PHOSPHATE, DIBASIC ANHYDROUS 15 MMOL: 142; 276 INJECTION, SOLUTION INTRAVENOUS at 12:31

## 2024-08-19 RX ADMIN — POTASSIUM CHLORIDE 10 MEQ: 1500 TABLET, EXTENDED RELEASE ORAL at 09:11

## 2024-08-19 RX ADMIN — BACITRACIN: 500 OINTMENT TOPICAL at 10:00

## 2024-08-19 RX ADMIN — CHLORHEXIDINE GLUCONATE 0.12% ORAL RINSE 15 ML: 1.2 LIQUID ORAL at 09:10

## 2024-08-19 RX ADMIN — POLYETHYLENE GLYCOL (3350) 17 G: 17 POWDER, FOR SOLUTION ORAL at 09:10

## 2024-08-19 RX ADMIN — GABAPENTIN 300 MG: 300 CAPSULE ORAL at 09:10

## 2024-08-19 RX ADMIN — AMIODARONE HYDROCHLORIDE 0.5 MG/MIN: 50 INJECTION, SOLUTION INTRAVENOUS at 02:28

## 2024-08-19 RX ADMIN — FAMOTIDINE 20 MG: 20 TABLET ORAL at 09:10

## 2024-08-19 RX ADMIN — BACITRACIN: 500 OINTMENT TOPICAL at 21:03

## 2024-08-19 RX ADMIN — MUPIROCIN: 20 OINTMENT TOPICAL at 21:03

## 2024-08-19 RX ADMIN — TRAMADOL HYDROCHLORIDE 50 MG: 50 TABLET ORAL at 23:32

## 2024-08-19 RX ADMIN — POTASSIUM CHLORIDE 20 MEQ: 29.8 INJECTION, SOLUTION INTRAVENOUS at 11:35

## 2024-08-19 RX ADMIN — SENNOSIDES AND DOCUSATE SODIUM 1 TABLET: 50; 8.6 TABLET ORAL at 20:58

## 2024-08-19 RX ADMIN — GABAPENTIN 300 MG: 300 CAPSULE ORAL at 20:57

## 2024-08-19 RX ADMIN — Medication 1 TABLET: at 09:10

## 2024-08-19 RX ADMIN — HEPARIN SODIUM 5000 UNITS: 5000 INJECTION INTRAVENOUS; SUBCUTANEOUS at 14:30

## 2024-08-19 RX ADMIN — FUROSEMIDE 40 MG: 10 INJECTION, SOLUTION INTRAMUSCULAR; INTRAVENOUS at 17:14

## 2024-08-19 RX ADMIN — Medication 3 ML: at 19:13

## 2024-08-19 RX ADMIN — TRAMADOL HYDROCHLORIDE 50 MG: 50 TABLET ORAL at 00:41

## 2024-08-19 RX ADMIN — METOPROLOL TARTRATE 50 MG: 50 TABLET, FILM COATED ORAL at 20:58

## 2024-08-19 RX ADMIN — HEPARIN SODIUM 5000 UNITS: 5000 INJECTION INTRAVENOUS; SUBCUTANEOUS at 07:00

## 2024-08-19 RX ADMIN — SODIUM CHLORIDE: 9 INJECTION, SOLUTION INTRAVENOUS at 09:22

## 2024-08-19 RX ADMIN — HEPARIN SODIUM 5000 UNITS: 5000 INJECTION INTRAVENOUS; SUBCUTANEOUS at 20:58

## 2024-08-19 RX ADMIN — ACETAMINOPHEN 1000 MG: 500 TABLET ORAL at 14:00

## 2024-08-19 RX ADMIN — SODIUM CHLORIDE, PRESERVATIVE FREE 10 ML: 5 INJECTION INTRAVENOUS at 09:11

## 2024-08-19 ASSESSMENT — ENCOUNTER SYMPTOMS
ALLERGIC/IMMUNOLOGIC NEGATIVE: 1
EYES NEGATIVE: 1

## 2024-08-19 ASSESSMENT — PAIN SCALES - GENERAL
PAINLEVEL_OUTOF10: 5
PAINLEVEL_OUTOF10: 0
PAINLEVEL_OUTOF10: 5
PAINLEVEL_OUTOF10: 6
PAINLEVEL_OUTOF10: 2
PAINLEVEL_OUTOF10: 0

## 2024-08-19 ASSESSMENT — PAIN - FUNCTIONAL ASSESSMENT: PAIN_FUNCTIONAL_ASSESSMENT: ACTIVITIES ARE NOT PREVENTED

## 2024-08-19 ASSESSMENT — PAIN DESCRIPTION - DESCRIPTORS: DESCRIPTORS: ACHING

## 2024-08-19 ASSESSMENT — PAIN DESCRIPTION - LOCATION: LOCATION: BACK

## 2024-08-19 NOTE — PROGRESS NOTES
I met with patient in room 2005.  This is POD # 3. I re-introduced myself to patient as the cardiac rehab nurse. Patient stated that he is having a good day today and feels better. Patient states that he has no needs at this time.   During patient's education visit, I introduced him to the [facility] Intensive [facility] Rehab [facility].I explained to him that this program is a customized out patient program of exercise and education. I explained to the patient that Cardiac Rehab is designed to help improve your hearts future.  Cardiac rehab is a medically supervised program designed to improve your cardiovascular health through educating about nutrition, exercise, and stress release.  I explained to patient that he would not be starting program for six weeks and that the Cardiac Rehab facility would be in contact with him to setup an appointment when it is closer to his release date from restrictions.  Patient had no further questions regarding rehab at this time.

## 2024-08-19 NOTE — PLAN OF CARE
Problem: ABCDS Injury Assessment  Goal: Absence of physical injury  8/19/2024 1055 by Kiesha Kimble RN  Outcome: Progressing  8/19/2024 0450 by aMggie Gonzales RN  Outcome: Progressing     Problem: Safety - Adult  Goal: Free from fall injury  8/19/2024 1055 by Kiesha Kimble RN  Outcome: Progressing  8/19/2024 0450 by Maggie Gonzales RN  Outcome: Progressing     Problem: Pain  Goal: Verbalizes/displays adequate comfort level or baseline comfort level  8/19/2024 1055 by Kiesha Kimble RN  Outcome: Progressing  Flowsheets (Taken 8/19/2024 0800)  Verbalizes/displays adequate comfort level or baseline comfort level: Assess pain using appropriate pain scale  8/19/2024 0450 by Maggie Gonzales RN  Outcome: Progressing

## 2024-08-19 NOTE — PROGRESS NOTES
[facility] Cardiothoracic Surgery  Daily Progress Note    Surgeon:  Dr. Zavala      Subjective:  Mr. Bourgeois is a 69 y.o. year-old male status post CABGx3 (LIMA-LAD, SVG-OM1, SVG-PDA) on 8/16/2024.     Patient was seen and examined at bedside this morning without any complaints.     Hospital Course:  08/14/24-08/15/24: Patient was admitted to the hospital by cardiology after Holter monitor found sinus tachycardia with PVCs.  He underwent a stress test that was positive for inferior wall ischemia.  He underwent left heart catheterization that revealed severe multivessel coronary artery disease therefore CT surgery team was consulted.  Preoperative testing was completed.  Patient was medically optimized preoperatively for surgery.  08/16/24: Patient underwent surgical intervention.  Tolerated the procedure well.  See op note for full details.  Transferred to the ICU in a stable condition.  Weaned off ventilator to oxygen.  08/17/24: remove arterial line, keep cordis and chest tubes, start lasix 20mg IV BID and metoprolol 25mg PO BID, wean O2   08/18/24: place PICC and remove cordis, increase lasix to 40mg IV BID d/t weight up and edematous on exam, start amio gtt d/t tachycardia in 120's with concern for conversion to afib, remove mejía, possibly remove pleural chest tube later this morning if output minimal, increase metoprolol to 37.5mg     Vital Signs: BP (!) 141/75   Pulse (!) 111   Temp 98.9 °F (37.2 °C) (Oral)   Resp 20   Ht 1.778 m (5' 10\")   Wt (!) 140.8 kg (310 lb 6.5 oz)   SpO2 94%   BMI 44.54 kg/m²  O2 Flow Rate (L/min): 6 L/min   Admit Weight: Weight - Scale: 135.6 kg (299 lb)   WEIGHTWeight - Scale: (!) 140.8 kg (310 lb 6.5 oz)     I/O's:  I/O last 3 completed shifts:  In: 1902.7 [P.O.:1320; I.V.:316; IV Piggyback:266.7]  Out: 2960 [Urine:2710; Chest Tube:250]    Data:    CBC:   Recent Labs     08/17/24  0600 08/18/24  0605 08/19/24  0415   WBC 13.6* 11.9*

## 2024-08-19 NOTE — PROGRESS NOTES
Cardiology Progress Note     Admit Date:  8/14/2024    Consult reason/ Seen today for :       Subjective and  Overnight Events : Post CABG on 8/16/2024 with LIMA to LAD SVG to OM and SVG to PDA  Still quite swollen complaining of leg swelling but was able to walk    Chief complain on admission : 69 y.o.year old who is admitted forNo chief complaint on file.     Assessment / Plan:  ASCVD: Post CABG continue supportive care as per primary ct surgery started on Plavix as per CT surgery  He is short of breath hypoxic consider starting diuresis  Wean off pressors add small dose beta-blockers metoprolol 25 twice daily  Echo showed preserved EF mildly enlarged left atrium right ventricle was dilated monitor closely  HTN: stable, continue To titrate up medication as needed  DVT Prophylaxis if no contraindication  Discussed with primary team, hospitalist service, bedside nursing staff and family  Past medical history:    has a past medical history of Hypertension.  Past surgical history:   has a past surgical history that includes Vasectomy; Total knee arthroplasty (Right); Tympanostomy tube placement (Left); Umbilical hernia repair; sinus surgery; Cardiac procedure (N/A, 8/14/2024); and Coronary artery bypass graft (N/A, 8/16/2024).  Social History:   reports that he has never smoked. He has never used smokeless tobacco. He reports that he does not drink alcohol and does not use drugs.  Family history:  family history includes Breast Cancer in his mother; Heart Failure in his father; Kidney Disease in his daughter; No Known Problems in his sister and sister.    No Known Allergies    Review of Systems:    All 14 systems were reviewed and are negative  Except for the positive findings  which as documented     /68   Pulse 87   Temp 98.9 °F (37.2 °C) (Oral)   Resp 19   Ht 1.778 m (5' 10\")   Wt (!) 140.8 kg (310 lb 6.5 oz)   SpO2 94%    all other medications of all above medical condition listed as is except for changes mentioned above.    Thank you very much for consult , please call with questions.    Sayed Ortiz Scott MD, MD 8/19/2024 3:06 PM     The above note is prepared with the intention to serve as communication with trained medical care practitioners only. Some of the information is provided by secondary sources as well as from our patient and/or family member(s) recollection, thus inaccuracies may occur. In addition, other professionals integrate data into the electronic medical record, and the Heart Team MD and NPs are not responsible for these. Any errors will be corrected upon verification with documented reports.

## 2024-08-19 NOTE — PLAN OF CARE
Problem: Discharge Planning  Goal: Discharge to home or other facility with appropriate resources  8/19/2024 0450 by Maggie Gonzales RN  Outcome: Progressing  8/18/2024 1954 by Lucila Bey RN  Outcome: Progressing     Problem: ABCDS Injury Assessment  Goal: Absence of physical injury  8/19/2024 0450 by Maggie Gonzales RN  Outcome: Progressing  8/18/2024 1954 by Lucila Bey RN  Outcome: Progressing     Problem: Safety - Adult  Goal: Free from fall injury  8/19/2024 0450 by Maggie Gonzales RN  Outcome: Progressing  8/18/2024 1954 by Lucila Bey RN  Outcome: Progressing     Problem: Pain  Goal: Verbalizes/displays adequate comfort level or baseline comfort level  8/19/2024 0450 by Maggie Gonzales RN  Outcome: Progressing  8/18/2024 1954 by Lucila Bey RN  Outcome: Progressing

## 2024-08-19 NOTE — PROGRESS NOTES
Inpatient critical care progress note 8/19/2024        Mark Bourgeois  1955  [id number]      Assessment/Plan:    S/P CABG, preserved EF  Hypoxemia postop secondary to atelectasis  HTN per history  GERD per history  Pulmonary nodule (solitary, well circumscribed, non calcified, right lower lobe location) per history  Obesity, suspected sleep apnea        Downward titration of supplemental oxygen, saturation goal 92-94% via pulse oximetry  Postoperative pulmonary hygiene measures  Glycemic control, transition off insulin infusion 8/17  Hemodynamic support per cardiothoracic surgical service  Ulcer, DVT prophylaxis  Empiric nocturnal CPAP (either recommend outpatient formal sleep evaluation)     Suggest a follow-up CT scan of the chest in approximately 6 months given the patient's age (otherwise he has no significant malignancy risk factors).     No further Critical Care needs at this time, will sign off.     Subjective:    No acute overnight events.  The patient was successfully extubated to high flow supplemental oxygen 8/16/2024 at 1754 hrs. Doing well. Ambulated.     On 6L NC. On diuresis. Planned for removal of chest tubes today. Encouraged greater use of Incentive spirometer    Acceptable hemodynamics rhythm and urine output.      Review of Systems   HENT: Negative.     Eyes: Negative.    Endocrine: Negative.    Genitourinary: Negative.    Musculoskeletal: Negative.    Skin: Negative.    Allergic/Immunologic: Negative.    Neurological: Negative.    Hematological: Negative.    Psychiatric/Behavioral: Negative.              Physical Exam:          BP (!) 141/75   Pulse (!) 111   Temp 98.9 °F (37.2 °C) (Oral)   Resp 20   Ht 1.778 m (5' 10\")   Wt (!) 140.8 kg (310 lb 6.5 oz)   SpO2 94%   BMI 44.54 kg/m²       General: Obese male, awake alert no distress vitals reviewed  Eyes: Motor pupils intact  ENT:   Moist oral mucosa, Neck supple  Cardiovascular: Regular rate, distant cardiac tones.  Respiratory:  Clear to auscultation, median sternotomy mediastinal chest tubes (absence of airleak)  Gastrointestinal: Soft, non tender, central obesity, bowel sounds throughout  Genitourinary: no suprapubic tenderness  Musculoskeletal: Mild lower extremity edema minimal stasis changes.  Skin: warm, dry  Neuro: Alert, appropriately interactive, no obvious focal findings..  Psych: Mood appropriate for circumstance.    Current Medications:   metoprolol tartrate  50 mg Oral BID    furosemide  40 mg IntraVENous BID    sodium chloride flush  5-40 mL IntraVENous 2 times per day    guaiFENesin  600 mg Oral BID    insulin lispro  0-4 Units SubCUTAneous TID WC    insulin lispro  0-4 Units SubCUTAneous Nightly    potassium chloride  10 mEq Oral BID    tamsulosin  0.4 mg Oral Daily    sodium chloride flush  5-40 mL IntraVENous 2 times per day    aspirin  81 mg Oral Daily    clopidogrel  75 mg Oral Daily    chlorhexidine  15 mL Mouth/Throat BID    mupirocin   Each Nostril BID    multivitamin  1 tablet Oral Daily with breakfast    polyethylene glycol  17 g Oral Daily    sennosides-docusate sodium  1 tablet Oral BID    atorvastatin  40 mg Oral Nightly    famotidine  20 mg Oral BID    Or    famotidine (PEPCID) injection  20 mg IntraVENous BID    heparin (porcine)  5,000 Units SubCUTAneous 3 times per day    bacitracin   Topical BID    acetaminophen  1,000 mg Oral 4 times per day    gabapentin  300 mg Oral TID       Labs, Imaging and Studies reviewed:    XR CHEST PORTABLE    Result Date: 8/16/2024  Chest X-ray INDICATION: Postop open heart surgery COMPARISON:  None TECHNIQUE: AP/PA view of the chest was obtained. FINDINGS: The tip of the endotracheal tube is 2.5 cm above the joce and the tip of the IJ sheath is at the level of the superior vena cava. The tip of the nasogastric tube does not appear to extend below the level of the diaphragm. There is also a left chest tube with its tip extending towards the left apex and a mediastinal tube.

## 2024-08-19 NOTE — PROGRESS NOTES
1000: pt educated on appropriate breathing exercises for chest tube removal; pt demonstrated understanding. Site cleansed with betadine; sutures removed intact.  Mediastinal  chest tubes x2 removed at this time. Purse-string wires reinforced. Petroleum gauze and dry gauze secured by silk cloth tape. No complications, pt tolerated well; RR 20, SpO2 94%.

## 2024-08-19 NOTE — PROGRESS NOTES
V2.0  [facility] Consult Note      Name:  Mark Bourgeois /Age/Sex: 1955  (69 y.o. male)   MRN & CSN:  [id number] & [id number] Encounter Date/Time: 2024 5:17 PM EDT   Location:  -A PCP: Hyacinth Garcia MD     Attending:Eddie Zavala MD  Consulting Provider: Lu Carver MD      Hospital Day: 6    Assessment and Recommendations   Mark Bourgeois is a 69 y.o. male who presents with Abnormal nuclear stress test    Recommendations:  CAD s/p 3V CABG   Left heart cath : Proximal LAD stenosis of 70 to 80%, first OM stenosis of 70 to 80%, 100% occluded possible  or 99% stenosis of ostial RCA, RCA filled by collaterals from the left, right subclavian artery is 100% occluded  --Completed CT chest, BLE vein mapping and carotid duplex  --Echo 8/15: EF 55-60% indeterminate diastolic function, mod to severely dilated RV, mild TR  --He is s/p CABG with CT surgery who is his primary  --Cardiology following, appreciate recs. Planned for cardiac rehanb  --Continue metoprolol 25mg BID, Aspirin and statin daily  --Continue IV diuresis, per CT surgery/cardio  --Continue IV amio for afib  --Chest tube removal today and wean O2    #Hypophosphatemia  Phos: 2  Replete and monitor    HTN  --Continue metoprolol and durosemide. Amlodipine discontinued?    Lung nodule x2  3.7 cm x 2.4 cm nodule at the L paratracheal location (thyroid nodule vs mediastinal adenopathy) and 8 mm nodule of RLL  --CT and/or thyroid US as outpatient   Repeat CT chest in 6-12 months  to reassess RLL nodule    Class III obesity  BMI 41.72  --low HDL, A1c: 5.8%  --Will need counseling for weight reduction and lifestyle modifications    GERD  --Continue famotidine      Diet ADULT DIET; Regular; 4 carb choices (60 gm/meal); Low Fat/Low Chol/High Fiber/2 gm Na   DVT Prophylaxis [] Lovenox, [x]  Heparin, [] SCDs, [x] Ambulation,  [] Eliquis, [] Xarelto   Code Status Full Code   Surrogate Decision Maker/ POA  Jenna Tripathi     Subjective:  ventricle. The catheter was flushed, pulled back across the aortic valve revealing no gradient. The catheter was removed. There were no complications. Patient tolerated the procedure well. The patient was transferred to the holding area in stable condition. Findings are reviewed with patient and discussed with the family.  Questions answered. Blood Loss : 5 cc Results: Hemodynamic findings: Please see the full hemodynamics from the cath lab report Left ventricular pressure 136/36 mmHg, LVEDP was 16 mmHG Aortic pressure 135/74 mmHg Coronary anatomy: Right /  Dominant system The left main coronary artery has no significant disease. Left anterior descending artery is a type III .  Proximal LAD is diffusely diseased is about 6070% long lesion distally it is a type III vessel and gives collateral to the right coronary artery Circumflex artery has no significant disease.Om branch further bifurcates , the OM 1 has a 70 to 80% proximal stenosis The right coronary artery is a dominant vessel , the ostial RCA is 99% stenosed and may be a chronic total occlusion as distally RCA is a large vessel it is filled by collaterals from the left side After reviewing the anatomy we decided to proceed with RFR of the LAD XB 3 oh guide was used to engage the left main RFR wire was equalized and advanced into the LAD.  RFR was calculated X 3 to be 0.84 x 0.83 and 0.84 Impression: 70 to 80% proximal LAD stenosis 70 to 80% first OM stenosis 100% occluded possible  or 99% stenosis of the ostial RCA RCA is filled by collaterals from the left side RFR of the LAD is 0.84 Right subclavian artery is 100% occluded  Recommendations: Surgical evaluation for CABG Aggressive risk and lifestyle modification Findings are reviewed and discussed  with patient and family. Questions are answered.  Post procedure activity restrictions and continued risk modification and follow up recommended. Derek Scott MD, 8/14/2024 4:11 PM     XR CHEST (2

## 2024-08-19 NOTE — PROGRESS NOTES
24 hour central line rounding completed. No dressing change indicated. Dressing is clean, dry, and intact. Patient continues to meet the following criteria for central vascular access: Irritant/vesicant medication (Calcium chloride)    Consult the Vascular Access Team for questions, concerns, or change in patient's condition.

## 2024-08-19 NOTE — CARE COORDINATION
CM reviewed pt's medical record. CM saw pt to discuss discharge planning. Pt's plan for discharge is home with daughter and [facility]. Pt denies needing IP PT/OT or SNF placement.

## 2024-08-19 NOTE — PROGRESS NOTES
Physical Therapy    Physical Therapy Treatment Note  Name: Mark Bourgeois MRN: [id number] :   1955   Date:  2024   Admission Date: 2024 Room:  A   Restrictions/Precautions:          sternal precautions, fall risk  Communication with other providers:  Hand off with Nurse    Subjective:  Patient states:  \"I just laid down and got comfortable\"  Pain:   Location, Type, Intensity (0/10 to 10/10):  no c/o pain, chest tube out recently   Objective:    Observation:  Pt. Supine In bed with HOB elevated.   Objective Measures:  Pt. SpO2 >90% throughout session.   Treatment, including education/measures:  Therapeutic Activity Training:   Therapeutic activity training was instructed today.  Cues were given for safety, sequence, UE/LE placement, awareness, and balance.    Activities performed today included bed mobility training, sup-sit, sit-stand.    Bed Mobility: MaxA, Max cues for no UE use. Increased time required for sequencing and explanation.   Sit to stand transfer: CGA-SBA x5 reps at EOB    Sitting balance:ModA progressing to SBA at EOB with feet on floor.  Standing balance:CGA with no AD    Gait Training:  Pt. Declines d/t just getting back from walk prior to session.     Education:   Pt. Educated on importance of out of bed activity, safe functional mobility, and sternal precautions.     Assessment / Impression:    Pt. States he plans of sleeping in the recliner upon going home and will have a family member to assist him.   Pt. Left supine in bed at end of session, all needs met, phone and call light in reach, bed/personal alarm active, and nursing updated.     Patient's tolerance of treatment:  Good   Adverse Reaction: none  Significant change in status and impact:  none  Barriers to improvement:  none  Plan for Next Session:    Cont per POC and progress as able.   Timed Code Treatment Minutes: 38 Minutes  PT Individual Minutes  Time In: 1046  Time Out: 1124  Minutes: 38               Previously filed items:  Social/Functional History  Lives With: Daughter, Other (comment) (2 grandsons)  Type of Home: House  Home Layout: Multi-level, Performs ADL's on one level, Able to Live on Main level with bedroom/bathroom  Home Access: Stairs to enter with rails  Entrance Stairs - Number of Steps: 3-4  Home Equipment: None  Receives Help From: Other (comment) (n/a)  ADL Assistance: Independent  Homemaking Assistance: Independent  Ambulation Assistance: Independent  Transfer Assistance: Independent  Active : Yes        Short Term Goals  Time Frame for Short Term Goals: 1 week  Short Term Goal 1: pt to complete all bed mobility mod I  Short Term Goal 2: pt to complete all STS transfers to/from bed, commode, and chair mod I  Short Term Goal 3: pt to ambulate 200' with LRAD mod I  Short Term Goal 4: pt to ascend/descend 4 stairs with SBA to simulate home set up    Electronically signed by:    Juan Rivera PTA  8/19/2024, 1:40 PM

## 2024-08-20 ENCOUNTER — APPOINTMENT (OUTPATIENT)
Dept: GENERAL RADIOLOGY | Age: 69
DRG: 233 | End: 2024-08-20
Attending: INTERNAL MEDICINE
Payer: COMMERCIAL

## 2024-08-20 LAB
ANION GAP SERPL CALCULATED.3IONS-SCNC: 11 MMOL/L (ref 7–16)
APTT: 41.8 SECONDS (ref 25.1–37.1)
BUN SERPL-MCNC: 22 MG/DL (ref 6–23)
CALCIUM IONIZED: ABNORMAL MMOL/L (ref 1.12–1.32)
CALCIUM SERPL-MCNC: 8.2 MG/DL (ref 8.3–10.6)
CHLORIDE BLD-SCNC: 105 MMOL/L (ref 99–110)
CO2: 26 MMOL/L (ref 21–32)
CREAT SERPL-MCNC: 0.9 MG/DL (ref 0.9–1.3)
EKG ATRIAL RATE: 99 BPM
EKG DIAGNOSIS: NORMAL
EKG P AXIS: 29 DEGREES
EKG P-R INTERVAL: 128 MS
EKG Q-T INTERVAL: 348 MS
EKG QRS DURATION: 88 MS
EKG QTC CALCULATION (BAZETT): 446 MS
EKG R AXIS: 5 DEGREES
EKG T AXIS: -31 DEGREES
EKG VENTRICULAR RATE: 99 BPM
FIBRINOGEN: 727 MG/DL (ref 170–540)
GFR, ESTIMATED: >90 ML/MIN/1.73M2
GLUCOSE BLD-MCNC: 135 MG/DL (ref 70–99)
GLUCOSE BLD-MCNC: 179 MG/DL (ref 70–99)
GLUCOSE BLD-MCNC: 192 MG/DL (ref 70–99)
GLUCOSE SERPL-MCNC: 122 MG/DL (ref 70–99)
HCT VFR BLD CALC: 33.3 % (ref 42–52)
HEMOGLOBIN: 10.3 GM/DL (ref 13.5–18)
INR BLD: 1.1 INDEX
MAGNESIUM: 2.1 MG/DL (ref 1.8–2.4)
MCH RBC QN AUTO: 28.1 PG (ref 27–31)
MCHC RBC AUTO-ENTMCNC: 30.9 % (ref 32–36)
MCV RBC AUTO: 90.7 FL (ref 78–100)
PDW BLD-RTO: 13.9 % (ref 11.7–14.9)
PHOSPHORUS: 3 MG/DL (ref 2.5–4.9)
PLATELET # BLD: 216 K/CU MM (ref 140–440)
PMV BLD AUTO: 10.2 FL (ref 7.5–11.1)
POTASSIUM SERPL-SCNC: 4.3 MMOL/L (ref 3.5–5.1)
PROTHROMBIN TIME: 15 SECONDS (ref 11.7–14.5)
RBC # BLD: 3.67 M/CU MM (ref 4.6–6.2)
SODIUM BLD-SCNC: 142 MMOL/L (ref 135–145)
WBC # BLD: 8.6 K/CU MM (ref 4–10.5)

## 2024-08-20 PROCEDURE — 93005 ELECTROCARDIOGRAM TRACING: CPT | Performed by: PHYSICIAN ASSISTANT

## 2024-08-20 PROCEDURE — 85610 PROTHROMBIN TIME: CPT

## 2024-08-20 PROCEDURE — 94640 AIRWAY INHALATION TREATMENT: CPT

## 2024-08-20 PROCEDURE — 6360000002 HC RX W HCPCS: Performed by: PHYSICIAN ASSISTANT

## 2024-08-20 PROCEDURE — 6370000000 HC RX 637 (ALT 250 FOR IP): Performed by: PHYSICIAN ASSISTANT

## 2024-08-20 PROCEDURE — 97530 THERAPEUTIC ACTIVITIES: CPT

## 2024-08-20 PROCEDURE — 94669 MECHANICAL CHEST WALL OSCILL: CPT

## 2024-08-20 PROCEDURE — 2500000003 HC RX 250 WO HCPCS: Performed by: PHYSICIAN ASSISTANT

## 2024-08-20 PROCEDURE — 83735 ASSAY OF MAGNESIUM: CPT

## 2024-08-20 PROCEDURE — 2580000003 HC RX 258: Performed by: PHYSICIAN ASSISTANT

## 2024-08-20 PROCEDURE — 85730 THROMBOPLASTIN TIME PARTIAL: CPT

## 2024-08-20 PROCEDURE — 94761 N-INVAS EAR/PLS OXIMETRY MLT: CPT

## 2024-08-20 PROCEDURE — 85384 FIBRINOGEN ACTIVITY: CPT

## 2024-08-20 PROCEDURE — 97116 GAIT TRAINING THERAPY: CPT

## 2024-08-20 PROCEDURE — 82330 ASSAY OF CALCIUM: CPT

## 2024-08-20 PROCEDURE — 84100 ASSAY OF PHOSPHORUS: CPT

## 2024-08-20 PROCEDURE — 82962 GLUCOSE BLOOD TEST: CPT

## 2024-08-20 PROCEDURE — 80048 BASIC METABOLIC PNL TOTAL CA: CPT

## 2024-08-20 PROCEDURE — 2060000000 HC ICU INTERMEDIATE R&B

## 2024-08-20 PROCEDURE — 71046 X-RAY EXAM CHEST 2 VIEWS: CPT

## 2024-08-20 PROCEDURE — 2700000000 HC OXYGEN THERAPY PER DAY

## 2024-08-20 PROCEDURE — 85027 COMPLETE CBC AUTOMATED: CPT

## 2024-08-20 PROCEDURE — 94150 VITAL CAPACITY TEST: CPT

## 2024-08-20 PROCEDURE — 97535 SELF CARE MNGMENT TRAINING: CPT

## 2024-08-20 PROCEDURE — 93010 ELECTROCARDIOGRAM REPORT: CPT | Performed by: INTERNAL MEDICINE

## 2024-08-20 RX ADMIN — SENNOSIDES AND DOCUSATE SODIUM 1 TABLET: 50; 8.6 TABLET ORAL at 21:21

## 2024-08-20 RX ADMIN — HEPARIN SODIUM 5000 UNITS: 5000 INJECTION INTRAVENOUS; SUBCUTANEOUS at 14:23

## 2024-08-20 RX ADMIN — HEPARIN SODIUM 5000 UNITS: 5000 INJECTION INTRAVENOUS; SUBCUTANEOUS at 06:06

## 2024-08-20 RX ADMIN — POLYETHYLENE GLYCOL (3350) 17 G: 17 POWDER, FOR SOLUTION ORAL at 08:53

## 2024-08-20 RX ADMIN — POTASSIUM CHLORIDE 10 MEQ: 1500 TABLET, EXTENDED RELEASE ORAL at 08:54

## 2024-08-20 RX ADMIN — MUPIROCIN: 20 OINTMENT TOPICAL at 21:25

## 2024-08-20 RX ADMIN — BACITRACIN: 500 OINTMENT TOPICAL at 10:07

## 2024-08-20 RX ADMIN — SODIUM CHLORIDE: 9 INJECTION, SOLUTION INTRAVENOUS at 17:31

## 2024-08-20 RX ADMIN — Medication 3 ML: at 07:23

## 2024-08-20 RX ADMIN — FUROSEMIDE 40 MG: 10 INJECTION, SOLUTION INTRAMUSCULAR; INTRAVENOUS at 18:04

## 2024-08-20 RX ADMIN — FUROSEMIDE 40 MG: 10 INJECTION, SOLUTION INTRAMUSCULAR; INTRAVENOUS at 08:54

## 2024-08-20 RX ADMIN — POTASSIUM CHLORIDE 10 MEQ: 1500 TABLET, EXTENDED RELEASE ORAL at 21:21

## 2024-08-20 RX ADMIN — GABAPENTIN 300 MG: 300 CAPSULE ORAL at 08:54

## 2024-08-20 RX ADMIN — HEPARIN SODIUM 5000 UNITS: 5000 INJECTION INTRAVENOUS; SUBCUTANEOUS at 21:20

## 2024-08-20 RX ADMIN — METOPROLOL TARTRATE 75 MG: 50 TABLET, FILM COATED ORAL at 08:54

## 2024-08-20 RX ADMIN — Medication 1 TABLET: at 08:54

## 2024-08-20 RX ADMIN — GABAPENTIN 300 MG: 300 CAPSULE ORAL at 14:23

## 2024-08-20 RX ADMIN — MUPIROCIN: 20 OINTMENT TOPICAL at 10:07

## 2024-08-20 RX ADMIN — SENNOSIDES AND DOCUSATE SODIUM 1 TABLET: 50; 8.6 TABLET ORAL at 08:54

## 2024-08-20 RX ADMIN — ASPIRIN 81 MG: 81 TABLET, CHEWABLE ORAL at 08:54

## 2024-08-20 RX ADMIN — FAMOTIDINE 20 MG: 20 TABLET ORAL at 21:21

## 2024-08-20 RX ADMIN — Medication 3 ML: at 19:23

## 2024-08-20 RX ADMIN — METOPROLOL TARTRATE 75 MG: 50 TABLET, FILM COATED ORAL at 21:21

## 2024-08-20 RX ADMIN — TAMSULOSIN HYDROCHLORIDE 0.4 MG: 0.4 CAPSULE ORAL at 08:54

## 2024-08-20 RX ADMIN — SODIUM CHLORIDE, PRESERVATIVE FREE 10 ML: 5 INJECTION INTRAVENOUS at 21:24

## 2024-08-20 RX ADMIN — SODIUM CHLORIDE: 9 INJECTION, SOLUTION INTRAVENOUS at 09:57

## 2024-08-20 RX ADMIN — AMIODARONE HYDROCHLORIDE 0.5 MG/MIN: 50 INJECTION, SOLUTION INTRAVENOUS at 10:04

## 2024-08-20 RX ADMIN — SODIUM CHLORIDE, PRESERVATIVE FREE 10 ML: 5 INJECTION INTRAVENOUS at 10:05

## 2024-08-20 RX ADMIN — GUAIFENESIN 600 MG: 600 TABLET, EXTENDED RELEASE ORAL at 08:54

## 2024-08-20 RX ADMIN — POTASSIUM CHLORIDE 20 MEQ: 29.8 INJECTION, SOLUTION INTRAVENOUS at 17:32

## 2024-08-20 RX ADMIN — CALCIUM CHLORIDE 1000 MG: 100 INJECTION, SOLUTION INTRAVENOUS at 09:57

## 2024-08-20 RX ADMIN — BACITRACIN: 500 OINTMENT TOPICAL at 21:24

## 2024-08-20 RX ADMIN — ATORVASTATIN CALCIUM 40 MG: 40 TABLET, FILM COATED ORAL at 21:21

## 2024-08-20 RX ADMIN — CLOPIDOGREL BISULFATE 75 MG: 75 TABLET ORAL at 08:55

## 2024-08-20 RX ADMIN — ACETAMINOPHEN 1000 MG: 500 TABLET ORAL at 12:50

## 2024-08-20 RX ADMIN — GABAPENTIN 300 MG: 300 CAPSULE ORAL at 21:20

## 2024-08-20 RX ADMIN — ACETAMINOPHEN 1000 MG: 500 TABLET ORAL at 18:03

## 2024-08-20 RX ADMIN — GUAIFENESIN 600 MG: 600 TABLET, EXTENDED RELEASE ORAL at 21:20

## 2024-08-20 RX ADMIN — FAMOTIDINE 20 MG: 20 TABLET ORAL at 08:55

## 2024-08-20 ASSESSMENT — PAIN SCALES - GENERAL
PAINLEVEL_OUTOF10: 4
PAINLEVEL_OUTOF10: 4
PAINLEVEL_OUTOF10: 0

## 2024-08-20 ASSESSMENT — PAIN DESCRIPTION - DESCRIPTORS
DESCRIPTORS: ACHING
DESCRIPTORS: ACHING

## 2024-08-20 ASSESSMENT — PAIN DESCRIPTION - LOCATION
LOCATION: CHEST
LOCATION: CHEST

## 2024-08-20 ASSESSMENT — PAIN DESCRIPTION - ORIENTATION
ORIENTATION: MID
ORIENTATION: MID

## 2024-08-20 ASSESSMENT — PAIN - FUNCTIONAL ASSESSMENT
PAIN_FUNCTIONAL_ASSESSMENT: ACTIVITIES ARE NOT PREVENTED
PAIN_FUNCTIONAL_ASSESSMENT: ACTIVITIES ARE NOT PREVENTED

## 2024-08-20 NOTE — PLAN OF CARE
Problem: ABCDS Injury Assessment  Goal: Absence of physical injury  Outcome: Progressing     Problem: Safety - Adult  Goal: Free from fall injury  Outcome: Progressing     Problem: Pain  Goal: Verbalizes/displays adequate comfort level or baseline comfort level  Outcome: Progressing     >Acknowledged orders from  and GISELL Owens. EKG done. Started Amiodarone drip and 75mg metoprolol tab.   > Started electrolytes correction as ordered

## 2024-08-20 NOTE — PROGRESS NOTES
Occupational Therapy    Occupational Therapy Treatment Note    Name: Mark Bourgeois MRN: [id number] :   1955   Date:  2024   Admission Date: 2024 Room:  A       Restrictions/Precautions:   General Precautions, Fall Risk, sternal precaution, wound vac, and oxygen    Communication with other providers: RN approved session, co tx to address bed mobility.     Subjective:  Patient states:  Pt agreeable to therapy session.   Pain:   Location, Type, Intensity (0/10 to 10/10):  denies pain.     Objective:    Observation: Pt supine in bed upon arrival.    Objective Measures:  Pt alert and oriented.     Treatment, including education:  Therapeutic Activity Training:   Therapeutic activity training was instructed today.  Cues were given for safety, sequence, UE/LE placement, awareness, and balance.    Activities performed today included bed mobility training, sup-sit, sit-stand, SPT.    Pt supine in bed upon arrival. Pt completed supine to sit with different techniques with MOD A x 2 to follow precautions.  Pt seated edge of bed with MOD A  x1-2. Pt completed sit to stand from edge of bed with CGA. Pt completed functional mobility approx household distance with CGA and returned seated in chair with CGA. Pt needs met and call light in reach.     Self Care Training:   Cues were given for safety, sequence, UE/LE placement, visual cues, and balance.    Activities performed today included UB/LB dressing tasks, toileting, hand hygiene at sink    Pt completed functional mobility from bed to bathroom with cardiac walker and CGA. Pt completed standing toileting with MIN A for clothing management. Pt required increased time. Pt completed hand hygiene with SBA.     Assessment / Impression:    Patient's tolerance of treatment: Well  Adverse Reaction: None  Significant change in status and impact: Improved from initial evaluation  Barriers to improvement: None noted      Plan for Next Session:    Continue OT POC      Time in:  1121  Time out:  1150  Timed treatment minutes:  33  Total treatment time:  33      Electronically signed by:      MOUSTAPHA Emerson

## 2024-08-20 NOTE — PROGRESS NOTES
0900: Seen and examined by Dr. Levine. New orders acknowledged.    1000: Seen and examined by Dr. Rosas. No new orders

## 2024-08-20 NOTE — PROGRESS NOTES
Cardiology Progress Note     Admit Date:  8/14/2024    Consult reason/ Seen today for :       Subjective and  Overnight Events : Post CABG on 8/16/2024 with LIMA to LAD SVG to OM and SVG to PDA  Still quite swollen complaining of leg swelling but was able to walk    Chief complain on admission : 69 y.o.year old who is admitted forNo chief complaint on file.     Assessment / Plan:  ASCVD: Post CABG continue supportive care as per primary ct surgery started on Plavix as per CT surgery  He is short of breath hypoxic consider starting diuresis  Wean off pressors add small dose beta-blockers metoprolol 25 twice daily  Echo showed preserved EF mildly enlarged left atrium right ventricle was dilated monitor closely  HTN: stable, continue To titrate up medication as needed  DVT Prophylaxis if no contraindication  Discussed with primary team, hospitalist service, bedside nursing staff and family  Past medical history:    has a past medical history of Hypertension.  Past surgical history:   has a past surgical history that includes Vasectomy; Total knee arthroplasty (Right); Tympanostomy tube placement (Left); Umbilical hernia repair; sinus surgery; Cardiac procedure (N/A, 8/14/2024); and Coronary artery bypass graft (N/A, 8/16/2024).  Social History:   reports that he has never smoked. He has never used smokeless tobacco. He reports that he does not drink alcohol and does not use drugs.  Family history:  family history includes Breast Cancer in his mother; Heart Failure in his father; Kidney Disease in his daughter; No Known Problems in his sister and sister.    No Known Allergies    Review of Systems:    All 14 systems were reviewed and are negative  Except for the positive findings  which as documented     /70   Pulse (!) 102   Temp 98.8 °F (37.1 °C) (Oral)   Resp 24   Ht 1.778 m (5' 10\")   Wt (!) 141.2 kg (311 lb 3.2 oz)   SpO2 94%    BMI 44.65 kg/m²     Intake/Output Summary (Last 24 hours) at 8/20/2024 1649  Last data filed at 8/20/2024 1600  Gross per 24 hour   Intake 950 ml   Output 3500 ml   Net -2550 ml     Physical Exam:  Constitutional:  Well developed, Well nourished, No acute distress, Non-toxic appearance.   HENT:  Normocephalic, Atraumatic, Bilateral external ears normal, Oropharynx moist, No oral exudates, Nose normal. Neck-  Supple, No stridor.   Eyes:  PERRL, EOMI, Conjunctiva normal, No discharge.   Respiratory: Post chest tube post CABG normal breath sounds, No respiratory distress, No wheezing, No chest tenderness.   Cardiovascular:  Normal heart rate, Normal rhythm, No murmurs, No rubs, No gallops, JVP not elevated  Abdomen/GI:  Bowel sounds normal, Soft, No tenderness, No masses, No pulsatile masses.     Musculoskeletal:  No edema, No tenderness, No cyanosis, No clubbing. Good range of motion in all major joints. No tenderness to palpation   Integument:  Warm, Dry, No erythema, No rash.   Lymphatic:  No lymphadenopathy noted.   Neurologic:  Alert & oriented x 3, Normal motor function, Normal sensory function, No focal deficits noted.   Psychiatric:  Affect  and  Mood :no change    Medications:    metoprolol tartrate  75 mg Oral BID    sodium chloride nebulizer  3 mL Nebulization BID RT    furosemide  40 mg IntraVENous BID    sodium chloride flush  5-40 mL IntraVENous 2 times per day    guaiFENesin  600 mg Oral BID    insulin lispro  0-4 Units SubCUTAneous TID WC    insulin lispro  0-4 Units SubCUTAneous Nightly    potassium chloride  10 mEq Oral BID    tamsulosin  0.4 mg Oral Daily    aspirin  81 mg Oral Daily    clopidogrel  75 mg Oral Daily    mupirocin   Each Nostril BID    multivitamin  1 tablet Oral Daily with breakfast    polyethylene glycol  17 g Oral Daily    sennosides-docusate sodium  1 tablet Oral BID    atorvastatin  40 mg Oral Nightly    famotidine  20 mg Oral BID    Or    famotidine (PEPCID) injection  20 mg  you very much for consult , please call with questions.    Sayed Ortiz Scott MD, MD 8/20/2024 4:49 PM     The above note is prepared with the intention to serve as communication with trained medical care practitioners only. Some of the information is provided by secondary sources as well as from our patient and/or family member(s) recollection, thus inaccuracies may occur. In addition, other professionals integrate data into the electronic medical record, and the Heart Team MD and NPs are not responsible for these. Any errors will be corrected upon verification with documented reports.

## 2024-08-20 NOTE — PROGRESS NOTES
[facility] Cardiothoracic Surgery  Daily Progress Note    Surgeon:  Dr. Zavala      Subjective:  Mr. Bourgeois is a 69 y.o. year-old male status post CABGx3 (LIMA-LAD, SVG-OM1, SVG-PDA) on 8/16/2024.     Patient was seen and examined at bedside this morning without any complaints.     Hospital Course:  08/14/24-08/15/24: Patient was admitted to the hospital by cardiology after Holter monitor found sinus tachycardia with PVCs.  He underwent a stress test that was positive for inferior wall ischemia.  He underwent left heart catheterization that revealed severe multivessel coronary artery disease therefore CT surgery team was consulted.  Preoperative testing was completed.  Patient was medically optimized preoperatively for surgery.  08/16/24: Patient underwent surgical intervention.  Tolerated the procedure well.  See op note for full details.  Transferred to the ICU in a stable condition.  Weaned off ventilator to oxygen.  08/17/24: remove arterial line, keep cordis and chest tubes, start lasix 20mg IV BID and metoprolol 25mg PO BID, wean O2   08/18/24: place PICC and remove cordis, increase lasix to 40mg IV BID d/t weight up and edematous on exam, start amio gtt d/t tachycardia in 120's with concern for conversion to afib, remove mejía, possibly remove pleural chest tube later this morning if output minimal, increase metoprolol to 37.5mg   8/19/2024: Patient is on 6 L nasal cannula of oxygen.  Electrolyte replacement.  Diuresis with Lasix 40 mg IV twice daily.  Lopressor increased to 50 mg twice daily.  Tachycardic with heart rate 110.  Chest tube removed.  Remains on amiodarone drip at 0.5.  8/20/2024: Increase beta-blocker to 75 mg twice daily.  EKG.  Amiodarone drip at 0.5.  Electrolyte replacement.  Remains on 4.5 L nasal cannula oxygen.  Lasix 40 mg IV twice daily.    Vital Signs: /79   Pulse (!) 109   Temp 98.1 °F (36.7 °C) (Oral)   Resp 23   Ht 1.778 m (5'  the lingula and left lower lobe most  likely representing subsegmental atelectasis slightly improved when compared to  the prior study. Minimal right lower lobe subsegmental atelectasis is noted.  Minimal right upper lobe subsegmental atelectasis is noted. Small pleural  effusions are noted left greater than right.    Scheduled Meds:    metoprolol tartrate  75 mg Oral BID    sodium chloride nebulizer  3 mL Nebulization BID RT    furosemide  40 mg IntraVENous BID    sodium chloride flush  5-40 mL IntraVENous 2 times per day    guaiFENesin  600 mg Oral BID    insulin lispro  0-4 Units SubCUTAneous TID WC    insulin lispro  0-4 Units SubCUTAneous Nightly    potassium chloride  10 mEq Oral BID    tamsulosin  0.4 mg Oral Daily    aspirin  81 mg Oral Daily    clopidogrel  75 mg Oral Daily    mupirocin   Each Nostril BID    multivitamin  1 tablet Oral Daily with breakfast    polyethylene glycol  17 g Oral Daily    sennosides-docusate sodium  1 tablet Oral BID    atorvastatin  40 mg Oral Nightly    famotidine  20 mg Oral BID    Or    famotidine (PEPCID) injection  20 mg IntraVENous BID    heparin (porcine)  5,000 Units SubCUTAneous 3 times per day    bacitracin   Topical BID    acetaminophen  1,000 mg Oral 4 times per day    gabapentin  300 mg Oral TID     Continuous Infusions:    amiodarone      sodium chloride 10 mL/hr at 08/20/24 0957    dextrose             Physical Exam:    General: A&O x 3, NAD noted  Heart: Normal S1, S2, RRR, No murmurs noted   Sternum: Prevena in place   Lungs: Diminished BS bilaterally at the bases.  Abdomen: Soft, non tender, non distended, Hypoactive BS x 4   : Rincon removed  Extremities: No edema noted bilateral LE. EVH sites: CDI       Assessment:    Mr. Bourgeois is a 69 y.o. year-old male status post CABGx3 (LIMA-LAD, SVG-OM1, SVG-PDA) on 8/16/2024.         Plan:  Patient remains on 4.5 L nasal cannula of oxygen.  Wean as tolerated.  Electrolyte replacement per protocol.  Continue diuresis  with 40 mg IV Lasix twice daily  Lopressor increased to 75 mg twice daily.    Restart amiodarone drip 0.5.  Will need transition to p.o.  EKG today  Maintain transcutaneous pacing wires till discharge.  Continue aspirin, statin, Plavix, subcutaneous heparin.  Continue good pulmonary hygiene with airway clearance and incentive spirometry use.  Further recommendations per Dr. Levine    Level of Care:  Step-down   Patient Medically Ready for Discharge? - No     DC Plan: PT, OT, case management following.  Plan for home with home health care    The above recommendations including medications and orders were discussed and agreed upon with Dr. Abner Chavez PA-C 08/20/24 9:57 AM        New Consults 8:00AM-4:30PM: Call Office, 4:30PM to 8:00AM Surgeon on-call    CVICU or other units patient follow up: Keshawn author of this note 8:00AM-4:30PM    CVICU patient or urgent follow up: 4:30PM to 8:00AM Call or Page Surgeon on-call     Step-down patient follow up: 4:30PM to 8:00AM Page or secure chat PA on-call

## 2024-08-20 NOTE — PROGRESS NOTES
Physical Therapy    Physical Therapy Treatment Note  Name: Mark Bourgeois MRN: [id number] :   1955   Date:  2024   Admission Date: 2024 Room:  -A   Restrictions/Precautions:          sternal precautions, fall risk  Communication with other providers:  Hand off with Nurse, co-tx with MAYELIN Alamo for increased skilled ability with bed mobility.     Subjective:  Patient states:  Pt. Agreeable to work with therapy with increased encouragement.   Pain:   Location, Type, Intensity (0/10 to 10/10):  \"It improves everyday\" Does not rate current level.  Objective:    Observation:  Pt. Supine In bed with HOB elevated.   Objective Measures: Vitals  WNL   Treatment, including education/measures:  Therapeutic Activity Training:   Therapeutic activity training was instructed today.  Cues were given for safety, sequence, UE/LE placement, awareness, and balance.    Activities performed today included bed mobility training, sup-sit, sit-stand.    Bed Mobility: MaxA, Max cues for no UE use. Increased time required for sequencing and explanation.   Rolling to L- MaxAx2  Scooting to EOB- ModAx1-2  Sit to stand transfer: CGA    Sitting balance:ModA progressing to SBA at EOB with feet on floor.  Standing balance:CGA with no AD    Gait Training:  Amb x300ft, CGA progressing to SBA with CW. Amb x100ft, no AD, CGA. Pt. With mildly increased AUBREY and minimal postural sway. Pt. Encouraged to walk without  walker as able.     Education:   Pt. Educated on importance of out of bed activity, safe functional mobility, and sternal precautions.     Assessment / Impression:     Pt. Left up in recliner at end of session, all needs met, phone and call light in reach, bed/personal alarm active, and nursing updated.     Patient's tolerance of treatment:  Good   Adverse Reaction: none  Significant change in status and impact:  none  Barriers to improvement:  none  Plan for Next Session:    Cont per POC and progress as able.   Timed

## 2024-08-20 NOTE — PROGRESS NOTES
V2.0  [facility] Consult Note      Name:  Mark Bourgeois /Age/Sex: 1955  (69 y.o. male)   MRN & CSN:  [id number] & [id number] Encounter Date/Time: 2024 5:17 PM EDT   Location:  -A PCP: Hyacinth Garcia MD     Attending:Eddie Zavala MD  Consulting Provider: Lu Carver MD      Hospital Day: 7    Assessment and Recommendations   Mark Bourgeois is a 69 y.o. male who presents with Abnormal nuclear stress test    Recommendations:  CAD s/p 3V CABG   Left heart cath : Proximal LAD stenosis of 70 to 80%, first OM stenosis of 70 to 80%, 100% occluded possible  or 99% stenosis of ostial RCA, RCA filled by collaterals from the left, right subclavian artery is 100% occluded  --Completed CT chest, BLE vein mapping and carotid duplex  --Echo 8/15: EF 55-60% indeterminate diastolic function, mod to severely dilated RV, mild TR  --He is s/p CABG with CT surgery who is his primary. POD 4  --Cardiology following, appreciate recs. Planned for cardiac rehanb  --Continue metoprolol 25mg BID, Aspirin and statin daily  --Continue IV diuresis, per CT surgery/cardio  --Continue IV amio for afib  --Chest tube removed yesterday, patient tolerated well, wean O2  --Patient tolerating therapy    #Hypophosphatemia  Phos: 3  Replete and monitor    HTN  --Continue metoprolol and durosemide. Amlodipine discontinued?    Lung nodule x2  3.7 cm x 2.4 cm nodule at the L paratracheal location (thyroid nodule vs mediastinal adenopathy) and 8 mm nodule of RLL  --CT and/or thyroid US as outpatient   Repeat CT chest in 6-12 months  to reassess RLL nodule    Class III obesity  BMI 41.72  --low HDL, A1c: 5.8%  --Will need counseling for weight reduction and lifestyle modifications    GERD  --Continue famotidine      Diet ADULT DIET; Regular; 4 carb choices (60 gm/meal); Low Fat/Low Chol/High Fiber/2 gm Na   DVT Prophylaxis [] Lovenox, [x]  Heparin, [] SCDs, [x] Ambulation,  [] Eliquis, [] Xarelto   Code Status Full Code  Frontal and lateral views of the chest. FINDINGS: The lungs are clear.  No pleural effusion or pneumothorax. The cardiomediastinal silhouette is unremarkable. No acute osseous or soft tissue abnormality.     1.  No acute cardiopulmonary process. Electronically signed by Linda Vizcarra      CBC:   Recent Labs     08/18/24  0605 08/19/24  0415 08/20/24  0346   WBC 11.9* 9.7 8.6   HGB 10.8* 10.4* 10.3*    169 216     BMP:    Recent Labs     08/18/24  0605 08/19/24  0415 08/20/24  0346    141 142   K 4.3 4.0 4.3    105 105   CO2 22 26 26   BUN 17 18 22   CREATININE 1.0 0.8* 0.9   GLUCOSE 125* 129* 122*     Hepatic:   No results for input(s): \"AST\", \"ALT\", \"BILITOT\", \"ALKPHOS\" in the last 72 hours.    Invalid input(s): \"ALB\"    Lipids:   Lab Results   Component Value Date/Time    CHOL 117 08/14/2024 08:18 PM    HDL 27 08/14/2024 08:18 PM    TRIG 148 08/14/2024 08:18 PM     Hemoglobin A1C:   Lab Results   Component Value Date/Time    LABA1C 6.0 08/15/2024 12:56 PM     TSH:   Lab Results   Component Value Date/Time    TSH 2.33 07/06/2023 10:04 AM     Troponin: No results found for: \"TROPONINT\"  Lactic Acid: No results for input(s): \"LACTA\" in the last 72 hours.  BNP:   No results for input(s): \"PROBNP\" in the last 72 hours.    UA:  Lab Results   Component Value Date/Time    NITRU NEGATIVE 08/15/2024 02:00 PM    COLORU YELLOW 08/15/2024 02:00 PM    PHUR 5.5 08/15/2024 02:00 PM    WBCUA 1 08/15/2024 02:00 PM    RBCUA <1 08/15/2024 02:00 PM    MUCUS RARE 08/15/2024 02:00 PM    TRICHOMONAS NONE SEEN 08/15/2024 02:00 PM    BACTERIA NEGATIVE 08/15/2024 02:00 PM    CLARITYU CLEAR 08/15/2024 02:00 PM    LEUKOCYTESUR TRACE 08/15/2024 02:00 PM    UROBILINOGEN 0.2 08/15/2024 02:00 PM    BILIRUBINUR NEGATIVE 08/15/2024 02:00 PM    BLOODU NEGATIVE 08/15/2024 02:00 PM    GLUCOSEU NEGATIVE 08/15/2024 02:00 PM    KETUA NEGATIVE 08/15/2024 02:00 PM     Urine Cultures: No results found for: \"LABURIN\"  Blood Cultures: No

## 2024-08-21 ENCOUNTER — APPOINTMENT (OUTPATIENT)
Dept: GENERAL RADIOLOGY | Age: 69
DRG: 233 | End: 2024-08-21
Attending: INTERNAL MEDICINE
Payer: COMMERCIAL

## 2024-08-21 LAB
ANION GAP SERPL CALCULATED.3IONS-SCNC: 7 MMOL/L (ref 7–16)
APTT: 36.5 SECONDS (ref 25.1–37.1)
BUN SERPL-MCNC: 21 MG/DL (ref 6–23)
CALCIUM IONIZED: NORMAL MMOL/L (ref 1.12–1.32)
CALCIUM SERPL-MCNC: 8.4 MG/DL (ref 8.3–10.6)
CHLORIDE BLD-SCNC: 102 MMOL/L (ref 99–110)
CO2: 31 MMOL/L (ref 21–32)
CREAT SERPL-MCNC: 0.8 MG/DL (ref 0.9–1.3)
FIBRINOGEN: 675 MG/DL (ref 170–540)
GFR, ESTIMATED: >90 ML/MIN/1.73M2
GLUCOSE BLD-MCNC: 117 MG/DL (ref 70–99)
GLUCOSE BLD-MCNC: 138 MG/DL (ref 70–99)
GLUCOSE BLD-MCNC: 162 MG/DL (ref 70–99)
GLUCOSE SERPL-MCNC: 115 MG/DL (ref 70–99)
HCT VFR BLD CALC: 33.5 % (ref 42–52)
HEMOGLOBIN: 10.8 GM/DL (ref 13.5–18)
INR BLD: 1.1 INDEX
MAGNESIUM: 2.1 MG/DL (ref 1.8–2.4)
MCH RBC QN AUTO: 28.7 PG (ref 27–31)
MCHC RBC AUTO-ENTMCNC: 32.2 % (ref 32–36)
MCV RBC AUTO: 89.1 FL (ref 78–100)
PDW BLD-RTO: 13.9 % (ref 11.7–14.9)
PHOSPHORUS: 3.4 MG/DL (ref 2.5–4.9)
PLATELET # BLD: 244 K/CU MM (ref 140–440)
PMV BLD AUTO: 9.8 FL (ref 7.5–11.1)
POTASSIUM SERPL-SCNC: 4.2 MMOL/L (ref 3.5–5.1)
PROTHROMBIN TIME: 14.5 SECONDS (ref 11.7–14.5)
RBC # BLD: 3.76 M/CU MM (ref 4.6–6.2)
SODIUM BLD-SCNC: 140 MMOL/L (ref 135–145)
WBC # BLD: 7.7 K/CU MM (ref 4–10.5)

## 2024-08-21 PROCEDURE — 83735 ASSAY OF MAGNESIUM: CPT

## 2024-08-21 PROCEDURE — 6370000000 HC RX 637 (ALT 250 FOR IP): Performed by: PHYSICIAN ASSISTANT

## 2024-08-21 PROCEDURE — 94669 MECHANICAL CHEST WALL OSCILL: CPT

## 2024-08-21 PROCEDURE — 6360000002 HC RX W HCPCS: Performed by: PHYSICIAN ASSISTANT

## 2024-08-21 PROCEDURE — 84100 ASSAY OF PHOSPHORUS: CPT

## 2024-08-21 PROCEDURE — 94761 N-INVAS EAR/PLS OXIMETRY MLT: CPT

## 2024-08-21 PROCEDURE — 80048 BASIC METABOLIC PNL TOTAL CA: CPT

## 2024-08-21 PROCEDURE — 97530 THERAPEUTIC ACTIVITIES: CPT

## 2024-08-21 PROCEDURE — 97116 GAIT TRAINING THERAPY: CPT

## 2024-08-21 PROCEDURE — 85027 COMPLETE CBC AUTOMATED: CPT

## 2024-08-21 PROCEDURE — 2580000003 HC RX 258: Performed by: PHYSICIAN ASSISTANT

## 2024-08-21 PROCEDURE — 82962 GLUCOSE BLOOD TEST: CPT

## 2024-08-21 PROCEDURE — 85610 PROTHROMBIN TIME: CPT

## 2024-08-21 PROCEDURE — 94640 AIRWAY INHALATION TREATMENT: CPT

## 2024-08-21 PROCEDURE — 2700000000 HC OXYGEN THERAPY PER DAY

## 2024-08-21 PROCEDURE — 2060000000 HC ICU INTERMEDIATE R&B

## 2024-08-21 PROCEDURE — 94664 DEMO&/EVAL PT USE INHALER: CPT

## 2024-08-21 PROCEDURE — 82330 ASSAY OF CALCIUM: CPT

## 2024-08-21 PROCEDURE — 71046 X-RAY EXAM CHEST 2 VIEWS: CPT

## 2024-08-21 PROCEDURE — 94150 VITAL CAPACITY TEST: CPT

## 2024-08-21 PROCEDURE — 85730 THROMBOPLASTIN TIME PARTIAL: CPT

## 2024-08-21 PROCEDURE — 85384 FIBRINOGEN ACTIVITY: CPT

## 2024-08-21 RX ADMIN — ACETAMINOPHEN 1000 MG: 500 TABLET ORAL at 18:15

## 2024-08-21 RX ADMIN — ACETAMINOPHEN 1000 MG: 500 TABLET ORAL at 11:12

## 2024-08-21 RX ADMIN — AMIODARONE HYDROCHLORIDE 0.5 MG/MIN: 50 INJECTION, SOLUTION INTRAVENOUS at 16:36

## 2024-08-21 RX ADMIN — BACITRACIN: 500 OINTMENT TOPICAL at 08:41

## 2024-08-21 RX ADMIN — Medication 1 TABLET: at 08:28

## 2024-08-21 RX ADMIN — ACETAMINOPHEN 1000 MG: 500 TABLET ORAL at 06:44

## 2024-08-21 RX ADMIN — GUAIFENESIN 600 MG: 600 TABLET, EXTENDED RELEASE ORAL at 20:12

## 2024-08-21 RX ADMIN — Medication 3 ML: at 20:11

## 2024-08-21 RX ADMIN — SODIUM CHLORIDE, PRESERVATIVE FREE 10 ML: 5 INJECTION INTRAVENOUS at 08:35

## 2024-08-21 RX ADMIN — METOPROLOL TARTRATE 75 MG: 50 TABLET, FILM COATED ORAL at 20:12

## 2024-08-21 RX ADMIN — POTASSIUM CHLORIDE 10 MEQ: 1500 TABLET, EXTENDED RELEASE ORAL at 08:27

## 2024-08-21 RX ADMIN — FUROSEMIDE 40 MG: 10 INJECTION, SOLUTION INTRAMUSCULAR; INTRAVENOUS at 18:15

## 2024-08-21 RX ADMIN — Medication 3 ML: at 07:15

## 2024-08-21 RX ADMIN — FAMOTIDINE 20 MG: 20 TABLET ORAL at 08:28

## 2024-08-21 RX ADMIN — METOPROLOL TARTRATE 75 MG: 50 TABLET, FILM COATED ORAL at 08:26

## 2024-08-21 RX ADMIN — ATORVASTATIN CALCIUM 40 MG: 40 TABLET, FILM COATED ORAL at 20:12

## 2024-08-21 RX ADMIN — GUAIFENESIN 600 MG: 600 TABLET, EXTENDED RELEASE ORAL at 08:28

## 2024-08-21 RX ADMIN — GABAPENTIN 300 MG: 300 CAPSULE ORAL at 08:28

## 2024-08-21 RX ADMIN — HEPARIN SODIUM 5000 UNITS: 5000 INJECTION INTRAVENOUS; SUBCUTANEOUS at 06:44

## 2024-08-21 RX ADMIN — AMIODARONE HYDROCHLORIDE 0.5 MG/MIN: 50 INJECTION, SOLUTION INTRAVENOUS at 00:32

## 2024-08-21 RX ADMIN — TRAMADOL HYDROCHLORIDE 50 MG: 50 TABLET ORAL at 01:10

## 2024-08-21 RX ADMIN — POLYETHYLENE GLYCOL (3350) 17 G: 17 POWDER, FOR SOLUTION ORAL at 08:26

## 2024-08-21 RX ADMIN — TAMSULOSIN HYDROCHLORIDE 0.4 MG: 0.4 CAPSULE ORAL at 08:33

## 2024-08-21 RX ADMIN — FUROSEMIDE 40 MG: 10 INJECTION, SOLUTION INTRAMUSCULAR; INTRAVENOUS at 08:28

## 2024-08-21 RX ADMIN — SODIUM CHLORIDE, PRESERVATIVE FREE 10 ML: 5 INJECTION INTRAVENOUS at 20:14

## 2024-08-21 RX ADMIN — POTASSIUM CHLORIDE 10 MEQ: 1500 TABLET, EXTENDED RELEASE ORAL at 20:12

## 2024-08-21 RX ADMIN — HEPARIN SODIUM 5000 UNITS: 5000 INJECTION INTRAVENOUS; SUBCUTANEOUS at 14:10

## 2024-08-21 RX ADMIN — ONDANSETRON 4 MG: 2 INJECTION INTRAMUSCULAR; INTRAVENOUS at 01:10

## 2024-08-21 RX ADMIN — FAMOTIDINE 20 MG: 20 TABLET ORAL at 20:12

## 2024-08-21 RX ADMIN — BACITRACIN: 500 OINTMENT TOPICAL at 20:15

## 2024-08-21 RX ADMIN — GABAPENTIN 300 MG: 300 CAPSULE ORAL at 20:12

## 2024-08-21 RX ADMIN — GABAPENTIN 300 MG: 300 CAPSULE ORAL at 14:10

## 2024-08-21 RX ADMIN — HEPARIN SODIUM 5000 UNITS: 5000 INJECTION INTRAVENOUS; SUBCUTANEOUS at 20:13

## 2024-08-21 RX ADMIN — POTASSIUM CHLORIDE 20 MEQ: 29.8 INJECTION, SOLUTION INTRAVENOUS at 08:40

## 2024-08-21 RX ADMIN — SENNOSIDES AND DOCUSATE SODIUM 1 TABLET: 50; 8.6 TABLET ORAL at 08:27

## 2024-08-21 RX ADMIN — SENNOSIDES AND DOCUSATE SODIUM 1 TABLET: 50; 8.6 TABLET ORAL at 20:12

## 2024-08-21 RX ADMIN — CLOPIDOGREL BISULFATE 75 MG: 75 TABLET ORAL at 08:27

## 2024-08-21 RX ADMIN — TRAMADOL HYDROCHLORIDE 50 MG: 50 TABLET ORAL at 21:55

## 2024-08-21 RX ADMIN — ASPIRIN 81 MG: 81 TABLET, CHEWABLE ORAL at 08:26

## 2024-08-21 ASSESSMENT — PAIN DESCRIPTION - PAIN TYPE
TYPE: SURGICAL PAIN
TYPE: CHRONIC PAIN

## 2024-08-21 ASSESSMENT — PAIN DESCRIPTION - ORIENTATION
ORIENTATION: MID
ORIENTATION: MID

## 2024-08-21 ASSESSMENT — PAIN SCALES - GENERAL
PAINLEVEL_OUTOF10: 3
PAINLEVEL_OUTOF10: 5
PAINLEVEL_OUTOF10: 0
PAINLEVEL_OUTOF10: 5
PAINLEVEL_OUTOF10: 5
PAINLEVEL_OUTOF10: 0
PAINLEVEL_OUTOF10: 2

## 2024-08-21 ASSESSMENT — PAIN DESCRIPTION - DIRECTION
RADIATING_TOWARDS: NO WHERE
RADIATING_TOWARDS: BACK

## 2024-08-21 ASSESSMENT — PAIN DESCRIPTION - LOCATION
LOCATION: GENERALIZED
LOCATION: BACK
LOCATION: CHEST

## 2024-08-21 ASSESSMENT — PAIN DESCRIPTION - FREQUENCY
FREQUENCY: CONTINUOUS
FREQUENCY: CONTINUOUS

## 2024-08-21 ASSESSMENT — PAIN DESCRIPTION - ONSET
ONSET: ON-GOING
ONSET: AWAKENED FROM SLEEP

## 2024-08-21 ASSESSMENT — PAIN DESCRIPTION - DESCRIPTORS
DESCRIPTORS: ACHING
DESCRIPTORS: ACHING

## 2024-08-21 NOTE — PROGRESS NOTES
Physician Progress Note      PATIENT:               AGUSTIN STRONG  CSN #:                  [id number]  :                       1955  ADMIT DATE:       2024 11:13 AM  DISCH DATE:  RESPONDING  PROVIDER #:        YULIANA SUTTON          QUERY TEXT:    Pt admitted with CAD and underwent CABG.  Pt noted to have COPD and persistent   need for post op oxygen.  If possible, please document in the progress notes and discharge summary if   you are evaluating and/or treating any of the following:    The medical record reflects the following:  Risk Factors: post op CABG, COPD  Clinical Indicators: requiring 4-7 L HF oxygen post op, RR 14-29, SPO2 86-97%,   \"Hypoxemia postop secondary to atelectasis\" noted per PN   Treatment: supplemental oxygen, TCDB, pulmonary toilet, CXR, IV Lasix,   supportive care  Options provided:  -- Acute pulmonary insufficiency following surgery  -- Other - I will add my own diagnosis  -- Disagree - Not applicable / Not valid  -- Disagree - Clinically unable to determine / Unknown  -- Refer to Clinical Documentation Reviewer    PROVIDER RESPONSE TEXT:    This patient has acute postoperative pulmonary insufficiency.    Query created by: Keiko Hampton on 2024 2:17 PM      Electronically signed by:  YULIANA SUTTON 2024 10:45 AM

## 2024-08-21 NOTE — PROGRESS NOTES
Physical Therapy    Physical Therapy Treatment Note  Name: Mark Bourgeois MRN: [id number] :   1955   Date:  2024   Admission Date: 2024 Room:  -A   Restrictions/Precautions:          Fall Risk, sternal precautions  Communication with other providers:  Hand off with Nurse    Subjective:  Patient states:  Pt. Agreeable to work with therapy  Pain:   Location, Type, Intensity (0/10 to 10/10):  no c/o pain   Objective:    Observation:  Pt. Up in recliner upon arrival   Objective Measures:  128/67, room air 90% with activity.   Treatment, including education/measures:  Therapeutic Activity Training:   Therapeutic activity training was instructed today.  Cues were given for safety, sequence, UE/LE placement, awareness, and balance.    Activities performed today included bed mobility training, sup-sit, sit-stand.    Bed Mobility: DNT, OOB pre/post session  Sit to stand transfer: CGA    Sitting balance:Hugo   Standing balance:CGA     Gait Training:  Cues were given for safety, sequence, device management, balance, posture, awareness, path.    Amb x100ft, CGA, no AD. Pt. With decreased stride length. Pt. With increased fatigue noted this date. Pt. Requires 3 standing rest breaks.     Education:   Pt. Educated on importance of out of bed activity, safe functional mobility, and walker management.     Assessment / Impression:    Pt. Left up in recliner at end of session, all needs met, phone and call light in reach, bed/personal alarm active, and nursing updated.     Patient's tolerance of treatment:  Good   Adverse Reaction: none  Significant change in status and impact:  none  Barriers to improvement:  none  Plan for Next Session:    Cont per POC  Timed Code Treatment Minutes: 23 Minutes  PT Individual Minutes  Time In: 1025  Time Out: 1048  Minutes: 23              Previously filed items:  Social/Functional History  Lives With: Daughter, Other (comment) (2 grandsons)  Type of Home: House  Home

## 2024-08-21 NOTE — PLAN OF CARE
Problem: Discharge Planning  Goal: Discharge to home or other facility with appropriate resources  8/21/2024 0927 by Daphnie Tejada, RN  Outcome: Progressing  8/21/2024 0926 by Daphnie Tejada, RN  Outcome: Progressing     Problem: ABCDS Injury Assessment  Goal: Absence of physical injury  8/21/2024 0927 by Daphnie Tejada, RN  Outcome: Progressing  8/21/2024 0926 by Daphnie Tejada, RN  Outcome: Progressing     Problem: Safety - Adult  Goal: Free from fall injury  8/21/2024 0927 by Daphnie Tejada, RN  Outcome: Progressing  8/21/2024 0926 by Daphnie Tejada, RN  Outcome: Progressing     Problem: Pain  Goal: Verbalizes/displays adequate comfort level or baseline comfort level  8/21/2024 0927 by Daphnie Tejada, RN  Outcome: Progressing  8/21/2024 0926 by Daphnie Tejada, RN  Outcome: Progressing     Problem: Skin/Tissue Integrity  Goal: Absence of new skin breakdown  Description: 1.  Monitor for areas of redness and/or skin breakdown  2.  Assess vascular access sites hourly  3.  Every 4-6 hours minimum:  Change oxygen saturation probe site  4.  Every 4-6 hours:  If on nasal continuous positive airway pressure, respiratory therapy assess nares and determine need for appliance change or resting period.  Outcome: Progressing

## 2024-08-21 NOTE — PROGRESS NOTES
[facility] Cardiothoracic Surgery  Daily Progress Note    Surgeon:  Dr. Zavala      Subjective:  Mr. Bourgeois is a 69 y.o. year-old male status post CABGx3 (LIMA-LAD, SVG-OM1, SVG-PDA) on 8/16/2024.     Patient was seen and examined at bedside this morning without any complaints.     Hospital Course:  08/14/24-08/15/24: Patient was admitted to the hospital by cardiology after Holter monitor found sinus tachycardia with PVCs.  He underwent a stress test that was positive for inferior wall ischemia.  He underwent left heart catheterization that revealed severe multivessel coronary artery disease therefore CT surgery team was consulted.  Preoperative testing was completed.  Patient was medically optimized preoperatively for surgery.  08/16/24: Patient underwent surgical intervention.  Tolerated the procedure well.  See op note for full details.  Transferred to the ICU in a stable condition.  Weaned off ventilator to oxygen.  08/17/24: remove arterial line, keep cordis and chest tubes, start lasix 20mg IV BID and metoprolol 25mg PO BID, wean O2   08/18/24: place PICC and remove cordis, increase lasix to 40mg IV BID d/t weight up and edematous on exam, start amio gtt d/t tachycardia in 120's with concern for conversion to afib, remove mejía, possibly remove pleural chest tube later this morning if output minimal, increase metoprolol to 37.5mg   8/19/2024: Patient is on 6 L nasal cannula of oxygen.  Electrolyte replacement.  Diuresis with Lasix 40 mg IV twice daily.  Lopressor increased to 50 mg twice daily.  Tachycardic with heart rate 110.  Chest tube removed.  Remains on amiodarone drip at 0.5.  8/20/2024: Increase beta-blocker to 75 mg twice daily.  EKG.  Amiodarone drip at 0.5.  Electrolyte replacement.  Remains on 4.5 L nasal cannula oxygen.  Lasix 40 mg IV twice daily.  8/21/24: Patient down to 2 L nasal cannula of oxygen.  Remains on Lasix 40 mg IV twice daily.  Lopressor

## 2024-08-21 NOTE — PROGRESS NOTES
V2.0  [facility] Consult Note      Name:  Mark Bourgeois /Age/Sex: 1955  (69 y.o. male)   MRN & CSN:  [id number] & [id number] Encounter Date/Time: 2024 5:17 PM EDT   Location:  -A PCP: Hyacinth Garcia MD     Attending:Eddie Zavala MD  Consulting Provider: Lu Carver MD      Hospital Day: 8    Assessment and Recommendations   Mark Bourgeois is a 69 y.o. male who presents with Abnormal nuclear stress test    Recommendations:  CAD s/p 3V CABG   ARF w/ hypoxia  Left heart cath : Proximal LAD stenosis of 70 to 80%, first OM stenosis of 70 to 80%, 100% occluded possible  or 99% stenosis of ostial RCA, RCA filled by collaterals from the left, right subclavian artery is 100% occluded  --Completed CT chest, BLE vein mapping and carotid duplex  --Echo 8/15: EF 55-60% indeterminate diastolic function, mod to severely dilated RV, mild TR  --He is s/p CABG with CT surgery who is his primary. POD 5  --Cardiology following, appreciate recs. Planned for cardiac rehanb  --Continue metoprolol 25mg BID, Aspirin and statin daily  --Continue IV diuresis, per CT surgery/cardio  --Continue IV amio for afib  --Chest tube removed yesterday, patient tolerated well, wean O2 as able  --Patient tolerating therapy    #Hypophosphatemia  Phos: 3.4  Replete and monitor    HTN  --Continue metoprolol and furosemide. Amlodipine discontinued?    Lung nodule x2  3.7 cm x 2.4 cm nodule at the L paratracheal location (thyroid nodule vs mediastinal adenopathy) and 8 mm nodule of RLL  --CT and/or thyroid US as outpatient. Repeat CT chest in 6-12 months to reassess RLL nodule    Class III obesity  BMI 41.72  --Low HDL, A1c: 5.8%  --Will need counseling for weight reduction and lifestyle modifications    GERD  --Continue famotidine      Diet ADULT DIET; Regular; 4 carb choices (60 gm/meal); Low Fat/Low Chol/High Fiber/2 gm Na   DVT Prophylaxis [] Lovenox, [x]  Heparin, [] SCDs, [x] Ambulation,  [] Eliquis, [] Xarelto  DATE: 8/9/2024 11:10 AM INDICATION: Encounter for preprocedural cardiovascular examination COMPARISON: May 9, 2010 TECHNIQUE:  Frontal and lateral views of the chest. FINDINGS: The lungs are clear.  No pleural effusion or pneumothorax. The cardiomediastinal silhouette is unremarkable. No acute osseous or soft tissue abnormality.     1.  No acute cardiopulmonary process. Electronically signed by Linda Vizcarra      CBC:   Recent Labs     08/19/24 0415 08/20/24 0346 08/21/24  0438   WBC 9.7 8.6 7.7   HGB 10.4* 10.3* 10.8*    216 244     BMP:    Recent Labs     08/19/24 0415 08/20/24  0346 08/21/24  0438    142 140   K 4.0 4.3 4.2    105 102   CO2 26 26 31   BUN 18 22 21   CREATININE 0.8* 0.9 0.8*   GLUCOSE 129* 122* 115*     Hepatic:   No results for input(s): \"AST\", \"ALT\", \"BILITOT\", \"ALKPHOS\" in the last 72 hours.    Invalid input(s): \"ALB\"    Lipids:   Lab Results   Component Value Date/Time    CHOL 117 08/14/2024 08:18 PM    HDL 27 08/14/2024 08:18 PM    TRIG 148 08/14/2024 08:18 PM     Hemoglobin A1C:   Lab Results   Component Value Date/Time    LABA1C 6.0 08/15/2024 12:56 PM     TSH:   Lab Results   Component Value Date/Time    TSH 2.33 07/06/2023 10:04 AM     Troponin: No results found for: \"TROPONINT\"  Lactic Acid: No results for input(s): \"LACTA\" in the last 72 hours.  BNP:   No results for input(s): \"PROBNP\" in the last 72 hours.    UA:  Lab Results   Component Value Date/Time    NITRU NEGATIVE 08/15/2024 02:00 PM    COLORU YELLOW 08/15/2024 02:00 PM    PHUR 5.5 08/15/2024 02:00 PM    WBCUA 1 08/15/2024 02:00 PM    RBCUA <1 08/15/2024 02:00 PM    MUCUS RARE 08/15/2024 02:00 PM    TRICHOMONAS NONE SEEN 08/15/2024 02:00 PM    BACTERIA NEGATIVE 08/15/2024 02:00 PM    CLARITYU CLEAR 08/15/2024 02:00 PM    LEUKOCYTESUR TRACE 08/15/2024 02:00 PM    UROBILINOGEN 0.2 08/15/2024 02:00 PM    BILIRUBINUR NEGATIVE 08/15/2024 02:00 PM    BLOODU NEGATIVE 08/15/2024 02:00 PM    GLUCOSEU NEGATIVE  08/15/2024 02:00 PM    KETUA NEGATIVE 08/15/2024 02:00 PM     Urine Cultures: No results found for: \"LABURIN\"  Blood Cultures: No results found for: \"BC\"  No results found for: \"BLOODCULT2\"  Organism: No results found for: \"ORG\"      Electronically signed by Liseth Rosas MD on 8/21/2024 at 7:24 AM

## 2024-08-21 NOTE — PROGRESS NOTES
Cardiology Progress Note     Admit Date:  8/14/2024    Consult reason/ Seen today for :       Subjective and  Overnight Events :  got very sob today   Post CABG on 8/16/2024 with LIMA to LAD SVG to OM and SVG to PDA  Still quite swollen complaining of leg swelling but was able to walk    Chief complain on admission : 69 y.o.year old who is admitted forNo chief complaint on file.     Assessment / Plan:  ASCVD: Post CABG continue supportive care as per primary ct surgery started on Plavix as per CT surgery  He is short of breath hypoxic consider starting diuresis   Increase metoprolol 50  twice daily, change to po amiodarone 200 mg    Echo showed preserved EF mildly enlarged left atrium right ventricle was dilated monitor closely  HTN: stable, continue To titrate up medication as needed  DVT Prophylaxis if no contraindication  Discussed with primary team, hospitalist service, bedside nursing staff and family  Past medical history:    has a past medical history of Hypertension.  Past surgical history:   has a past surgical history that includes Vasectomy; Total knee arthroplasty (Right); Tympanostomy tube placement (Left); Umbilical hernia repair; sinus surgery; Cardiac procedure (N/A, 8/14/2024); and Coronary artery bypass graft (N/A, 8/16/2024).  Social History:   reports that he has never smoked. He has never used smokeless tobacco. He reports that he does not drink alcohol and does not use drugs.  Family history:  family history includes Breast Cancer in his mother; Heart Failure in his father; Kidney Disease in his daughter; No Known Problems in his sister and sister.    No Known Allergies    Review of Systems:    All 14 systems were reviewed and are negative  Except for the positive findings  which as documented     /69   Pulse 86   Temp 98.3 °F (36.8 °C) (Oral)   Resp 17   Ht 1.778 m (5' 10\")   Wt (!) 139.1 kg (306 lb 9.6  IntraVENous BID    heparin (porcine)  5,000 Units SubCUTAneous 3 times per day    bacitracin   Topical BID    acetaminophen  1,000 mg Oral 4 times per day    gabapentin  300 mg Oral TID      amiodarone 0.5 mg/min (08/21/24 1636)    sodium chloride Stopped (08/20/24 1843)    dextrose       sodium chloride flush, ipratropium 0.5 mg-albuterol 2.5 mg, sodium chloride, sodium chloride, ondansetron **OR** ondansetron, hydrALAZINE, bisacodyl, potassium chloride, calcium chloride 1,000 mg in sodium chloride 0.9 % 100 mL IVPB **OR** calcium chloride 2,000 mg in sodium chloride 0.9 % 100 mL IVPB, albumin human 5%, glucose, dextrose bolus **OR** dextrose bolus, glucagon (rDNA), dextrose, sodium phosphate 15 mmol in sodium chloride 0.9 % 250 mL IVPB, traMADol    Lab Data:  CBC:   Recent Labs     08/19/24 0415 08/20/24 0346 08/21/24  0438   WBC 9.7 8.6 7.7   HGB 10.4* 10.3* 10.8*   HCT 32.5* 33.3* 33.5*   MCV 89.8 90.7 89.1    216 244     BMP:   Recent Labs     08/19/24 0415 08/20/24 0346 08/21/24  0438    142 140   K 4.0 4.3 4.2    105 102   CO2 26 26 31   PHOS 2.0* 3.0 3.4   BUN 18 22 21   CREATININE 0.8* 0.9 0.8*     PT/INR:   Recent Labs     08/19/24 0415 08/20/24 0346 08/21/24  0438   PROTIME 17.2* 15.0* 14.5   INR 1.4 1.1 1.1     BNP:    No results for input(s): \"PROBNP\" in the last 72 hours.    TROPONIN: No results for input(s): \"TROPONINT\" in the last 72 hours.     ECHO : (interpreted by myself)  echocardiogram     Assessment:  69 y.o.year old who is admitted forNo chief complaint on file.   , active issues as noted below:  Impression:  Principal Problem:    Abnormal nuclear stress test  Active Problems:    ASCVD (arteriosclerotic cardiovascular disease)  Resolved Problems:    * No resolved hospital problems. *            All labs, medications and tests reviewed by myself , continue all other medications of all above medical condition listed as is except for changes mentioned above.    Thank you

## 2024-08-22 ENCOUNTER — APPOINTMENT (OUTPATIENT)
Dept: GENERAL RADIOLOGY | Age: 69
DRG: 233 | End: 2024-08-22
Attending: INTERNAL MEDICINE
Payer: COMMERCIAL

## 2024-08-22 ENCOUNTER — TELEPHONE (OUTPATIENT)
Dept: CARDIOTHORACIC SURGERY | Age: 69
End: 2024-08-22

## 2024-08-22 LAB
ANION GAP SERPL CALCULATED.3IONS-SCNC: 8 MMOL/L (ref 7–16)
APTT: 31.8 SECONDS (ref 25.1–37.1)
BUN SERPL-MCNC: 22 MG/DL (ref 6–23)
CALCIUM IONIZED: NORMAL MMOL/L (ref 1.12–1.32)
CALCIUM SERPL-MCNC: 8.6 MG/DL (ref 8.3–10.6)
CHLORIDE BLD-SCNC: 100 MMOL/L (ref 99–110)
CO2: 32 MMOL/L (ref 21–32)
CREAT SERPL-MCNC: 1 MG/DL (ref 0.9–1.3)
FIBRINOGEN: 719 MG/DL (ref 170–540)
GFR, ESTIMATED: 81 ML/MIN/1.73M2
GLUCOSE BLD-MCNC: 115 MG/DL (ref 70–99)
GLUCOSE BLD-MCNC: 131 MG/DL (ref 70–99)
GLUCOSE SERPL-MCNC: 114 MG/DL (ref 70–99)
HCT VFR BLD CALC: 34.8 % (ref 42–52)
HEMOGLOBIN: 11.1 GM/DL (ref 13.5–18)
INR BLD: 1.1 INDEX
MAGNESIUM: 2 MG/DL (ref 1.8–2.4)
MCH RBC QN AUTO: 28.3 PG (ref 27–31)
MCHC RBC AUTO-ENTMCNC: 31.9 % (ref 32–36)
MCV RBC AUTO: 88.8 FL (ref 78–100)
PDW BLD-RTO: 13.7 % (ref 11.7–14.9)
PHOSPHORUS: 3.7 MG/DL (ref 2.5–4.9)
PLATELET # BLD: 283 K/CU MM (ref 140–440)
PMV BLD AUTO: 9.7 FL (ref 7.5–11.1)
POTASSIUM SERPL-SCNC: 4.3 MMOL/L (ref 3.5–5.1)
PROTHROMBIN TIME: 14.1 SECONDS (ref 11.7–14.5)
RBC # BLD: 3.92 M/CU MM (ref 4.6–6.2)
SODIUM BLD-SCNC: 140 MMOL/L (ref 135–145)
WBC # BLD: 8.8 K/CU MM (ref 4–10.5)

## 2024-08-22 PROCEDURE — 6370000000 HC RX 637 (ALT 250 FOR IP): Performed by: PHYSICIAN ASSISTANT

## 2024-08-22 PROCEDURE — 82962 GLUCOSE BLOOD TEST: CPT

## 2024-08-22 PROCEDURE — 6360000002 HC RX W HCPCS: Performed by: PHYSICIAN ASSISTANT

## 2024-08-22 PROCEDURE — 85384 FIBRINOGEN ACTIVITY: CPT

## 2024-08-22 PROCEDURE — 82330 ASSAY OF CALCIUM: CPT

## 2024-08-22 PROCEDURE — 84100 ASSAY OF PHOSPHORUS: CPT

## 2024-08-22 PROCEDURE — 97116 GAIT TRAINING THERAPY: CPT

## 2024-08-22 PROCEDURE — 71046 X-RAY EXAM CHEST 2 VIEWS: CPT

## 2024-08-22 PROCEDURE — 85730 THROMBOPLASTIN TIME PARTIAL: CPT

## 2024-08-22 PROCEDURE — 80048 BASIC METABOLIC PNL TOTAL CA: CPT

## 2024-08-22 PROCEDURE — 97530 THERAPEUTIC ACTIVITIES: CPT

## 2024-08-22 PROCEDURE — 2060000000 HC ICU INTERMEDIATE R&B

## 2024-08-22 PROCEDURE — 83735 ASSAY OF MAGNESIUM: CPT

## 2024-08-22 PROCEDURE — 2580000003 HC RX 258: Performed by: PHYSICIAN ASSISTANT

## 2024-08-22 PROCEDURE — 85610 PROTHROMBIN TIME: CPT

## 2024-08-22 PROCEDURE — 85027 COMPLETE CBC AUTOMATED: CPT

## 2024-08-22 RX ORDER — AMIODARONE HYDROCHLORIDE 200 MG/1
200 TABLET ORAL DAILY
Status: DISCONTINUED | OUTPATIENT
Start: 2024-08-22 | End: 2024-08-23 | Stop reason: HOSPADM

## 2024-08-22 RX ORDER — DOXYCYCLINE HYCLATE 100 MG
100 TABLET ORAL EVERY 12 HOURS SCHEDULED
Status: DISCONTINUED | OUTPATIENT
Start: 2024-08-22 | End: 2024-08-23 | Stop reason: HOSPADM

## 2024-08-22 RX ORDER — METOPROLOL SUCCINATE 50 MG/1
100 TABLET, EXTENDED RELEASE ORAL DAILY
Status: DISCONTINUED | OUTPATIENT
Start: 2024-08-23 | End: 2024-08-23

## 2024-08-22 RX ORDER — LACTOBACILLUS RHAMNOSUS GG 10B CELL
1 CAPSULE ORAL
Status: DISCONTINUED | OUTPATIENT
Start: 2024-08-23 | End: 2024-08-23 | Stop reason: HOSPADM

## 2024-08-22 RX ADMIN — FUROSEMIDE 40 MG: 10 INJECTION, SOLUTION INTRAMUSCULAR; INTRAVENOUS at 08:48

## 2024-08-22 RX ADMIN — CEPHALEXIN 500 MG: 250 CAPSULE ORAL at 22:34

## 2024-08-22 RX ADMIN — CLOPIDOGREL BISULFATE 75 MG: 75 TABLET ORAL at 08:47

## 2024-08-22 RX ADMIN — ACETAMINOPHEN 1000 MG: 500 TABLET ORAL at 13:05

## 2024-08-22 RX ADMIN — GUAIFENESIN 600 MG: 600 TABLET, EXTENDED RELEASE ORAL at 22:06

## 2024-08-22 RX ADMIN — HEPARIN SODIUM 5000 UNITS: 5000 INJECTION INTRAVENOUS; SUBCUTANEOUS at 22:06

## 2024-08-22 RX ADMIN — ASPIRIN 81 MG: 81 TABLET, CHEWABLE ORAL at 08:47

## 2024-08-22 RX ADMIN — GABAPENTIN 300 MG: 300 CAPSULE ORAL at 22:06

## 2024-08-22 RX ADMIN — FAMOTIDINE 20 MG: 20 TABLET ORAL at 08:47

## 2024-08-22 RX ADMIN — TRAMADOL HYDROCHLORIDE 50 MG: 50 TABLET ORAL at 04:08

## 2024-08-22 RX ADMIN — SODIUM CHLORIDE, PRESERVATIVE FREE 10 ML: 5 INJECTION INTRAVENOUS at 08:46

## 2024-08-22 RX ADMIN — ACETAMINOPHEN 1000 MG: 500 TABLET ORAL at 22:34

## 2024-08-22 RX ADMIN — SENNOSIDES AND DOCUSATE SODIUM 1 TABLET: 50; 8.6 TABLET ORAL at 08:47

## 2024-08-22 RX ADMIN — POTASSIUM CHLORIDE 10 MEQ: 1500 TABLET, EXTENDED RELEASE ORAL at 22:06

## 2024-08-22 RX ADMIN — AMIODARONE HYDROCHLORIDE 200 MG: 200 TABLET ORAL at 08:46

## 2024-08-22 RX ADMIN — SODIUM CHLORIDE, PRESERVATIVE FREE 10 ML: 5 INJECTION INTRAVENOUS at 22:19

## 2024-08-22 RX ADMIN — POTASSIUM CHLORIDE 10 MEQ: 1500 TABLET, EXTENDED RELEASE ORAL at 08:46

## 2024-08-22 RX ADMIN — HEPARIN SODIUM 5000 UNITS: 5000 INJECTION INTRAVENOUS; SUBCUTANEOUS at 13:05

## 2024-08-22 RX ADMIN — METOPROLOL SUCCINATE 75 MG: 50 TABLET, FILM COATED, EXTENDED RELEASE ORAL at 08:46

## 2024-08-22 RX ADMIN — ACETAMINOPHEN 1000 MG: 500 TABLET ORAL at 17:30

## 2024-08-22 RX ADMIN — CEPHALEXIN 500 MG: 250 CAPSULE ORAL at 13:06

## 2024-08-22 RX ADMIN — DOXYCYCLINE HYCLATE 100 MG: 100 TABLET, COATED ORAL at 22:34

## 2024-08-22 RX ADMIN — CEPHALEXIN 500 MG: 250 CAPSULE ORAL at 17:30

## 2024-08-22 RX ADMIN — POTASSIUM CHLORIDE 20 MEQ: 29.8 INJECTION, SOLUTION INTRAVENOUS at 18:17

## 2024-08-22 RX ADMIN — BACITRACIN: 500 OINTMENT TOPICAL at 08:49

## 2024-08-22 RX ADMIN — GUAIFENESIN 600 MG: 600 TABLET, EXTENDED RELEASE ORAL at 08:47

## 2024-08-22 RX ADMIN — FUROSEMIDE 40 MG: 10 INJECTION, SOLUTION INTRAMUSCULAR; INTRAVENOUS at 17:30

## 2024-08-22 RX ADMIN — GABAPENTIN 300 MG: 300 CAPSULE ORAL at 13:05

## 2024-08-22 RX ADMIN — TAMSULOSIN HYDROCHLORIDE 0.4 MG: 0.4 CAPSULE ORAL at 08:47

## 2024-08-22 RX ADMIN — FAMOTIDINE 20 MG: 20 TABLET ORAL at 22:06

## 2024-08-22 RX ADMIN — ATORVASTATIN CALCIUM 40 MG: 40 TABLET, FILM COATED ORAL at 22:06

## 2024-08-22 RX ADMIN — TRAMADOL HYDROCHLORIDE 50 MG: 50 TABLET ORAL at 22:13

## 2024-08-22 RX ADMIN — ONDANSETRON 4 MG: 4 TABLET, ORALLY DISINTEGRATING ORAL at 22:28

## 2024-08-22 RX ADMIN — Medication 1 TABLET: at 08:47

## 2024-08-22 RX ADMIN — BACITRACIN: 500 OINTMENT TOPICAL at 22:19

## 2024-08-22 RX ADMIN — GABAPENTIN 300 MG: 300 CAPSULE ORAL at 08:47

## 2024-08-22 RX ADMIN — HEPARIN SODIUM 5000 UNITS: 5000 INJECTION INTRAVENOUS; SUBCUTANEOUS at 04:08

## 2024-08-22 RX ADMIN — SENNOSIDES AND DOCUSATE SODIUM 1 TABLET: 50; 8.6 TABLET ORAL at 22:06

## 2024-08-22 RX ADMIN — ACETAMINOPHEN 1000 MG: 500 TABLET ORAL at 04:08

## 2024-08-22 ASSESSMENT — PAIN SCALES - GENERAL
PAINLEVEL_OUTOF10: 0
PAINLEVEL_OUTOF10: 1
PAINLEVEL_OUTOF10: 5
PAINLEVEL_OUTOF10: 6

## 2024-08-22 ASSESSMENT — PAIN DESCRIPTION - LOCATION: LOCATION: CHEST

## 2024-08-22 ASSESSMENT — PAIN SCALES - WONG BAKER: WONGBAKER_NUMERICALRESPONSE: NO HURT

## 2024-08-22 NOTE — PLAN OF CARE
Problem: Discharge Planning  Goal: Discharge to home or other facility with appropriate resources  Outcome: Progressing     Problem: ABCDS Injury Assessment  Goal: Absence of physical injury  Outcome: Progressing     Problem: Safety - Adult  Goal: Free from fall injury  Outcome: Progressing     Problem: Pain  Goal: Verbalizes/displays adequate comfort level or baseline comfort level  Outcome: Progressing     Problem: Skin/Tissue Integrity  Goal: Absence of new skin breakdown  Description: 1.  Monitor for areas of redness and/or skin breakdown  2.  Assess vascular access sites hourly  3.  Every 4-6 hours minimum:  Change oxygen saturation probe site  4.  Every 4-6 hours:  If on nasal continuous positive airway pressure, respiratory therapy assess nares and determine need for appliance change or resting period.  Outcome: Progressing

## 2024-08-22 NOTE — PROGRESS NOTES
Comprehensive Nutrition Assessment    Type and Reason for Visit:  Initial, RD Nutrition Re-Screen/LOS    Nutrition Recommendations/Plan:   Modify diet to 5 CHO choice to better meet his needs, cardiac low/sodium  Monitor weight, PO intakes, fluid status, POC         Nutrition Assessment:    Admitted with chest pain, s/p CABG 8/16, ASCVD, hyperlipidemia, GERD. Pt reports eating well, has been consuming 76%-100% of most of his meals per flowsheets. Offered oral supplement, both patient and RN denied need for one. Patient reported some weight loss during stay, however RN states it was related to fluid. Cw is in line with ubw per history. Will modidy diet to 5 CHO choices to better meet his needs. Provided brief healthy heart diet ed. Pt. needed to use restroom, but accepted handouts. Follow at low risk.    Nutrition Related Findings:    On lasix and bowel meds, 114-162 glucose Wound Type: Surgical Incision       Current Nutrition Intake & Therapies:    Average Meal Intake: %  Average Supplements Intake: Refusing to take  ADULT DIET; Regular; 4 carb choices (60 gm/meal); Low Fat/Low Chol/High Fiber/2 gm Na    Anthropometric Measures:  Height: 177.8 cm (5' 10\")  Ideal Body Weight (IBW): 166 lbs (75 kg)    Admission Body Weight: 141.5 kg (311 lb 15.2 oz) (First measured weight 8/17)  Current Body Weight: 136.1 kg (300 lb 0.7 oz), 180.8 % IBW. Weight Source: Standing Scale  Current BMI (kg/m2): 43.1  Usual Body Weight: 132.8 kg (292 lb 12.3 oz) (from 8/15/23)  % Weight Change (Calculated): 2.5  Weight Adjustment For: No Adjustment                 BMI Categories: Obese Class 3 (BMI 40.0 or greater)    Estimated Daily Nutrient Needs:  Energy Requirements Based On: Formula  Weight Used for Energy Requirements: Current  Energy (kcal/day): 7553-2856 (msj)  Weight Used for Protein Requirements: Ideal  Protein (g/day): 75-105g (1-1.4 g/kg)  Method Used for Fluid Requirements: 1 ml/kcal  Fluid (ml/day):

## 2024-08-22 NOTE — PROGRESS NOTES
[facility] Cardiothoracic Surgery  Daily Progress Note    Surgeon:  Dr. Zavala      Subjective:  Mr. Bourgeois is a 69 y.o. year-old male status post CABGx3 (LIMA-LAD, SVG-OM1, SVG-PDA) on 8/16/2024.     Patient was seen and examined at bedside this morning without any complaints.     Hospital Course:  08/14/24-08/15/24: Patient was admitted to the hospital by cardiology after Holter monitor found sinus tachycardia with PVCs.  He underwent a stress test that was positive for inferior wall ischemia.  He underwent left heart catheterization that revealed severe multivessel coronary artery disease therefore CT surgery team was consulted.  Preoperative testing was completed.  Patient was medically optimized preoperatively for surgery.  08/16/24: Patient underwent surgical intervention.  Tolerated the procedure well.  See op note for full details.  Transferred to the ICU in a stable condition.  Weaned off ventilator to oxygen.  08/17/24: remove arterial line, keep cordis and chest tubes, start lasix 20mg IV BID and metoprolol 25mg PO BID, wean O2   08/18/24: place PICC and remove cordis, increase lasix to 40mg IV BID d/t weight up and edematous on exam, start amio gtt d/t tachycardia in 120's with concern for conversion to afib, remove mejía, possibly remove pleural chest tube later this morning if output minimal, increase metoprolol to 37.5mg   8/19/2024: Patient is on 6 L nasal cannula of oxygen.  Electrolyte replacement.  Diuresis with Lasix 40 mg IV twice daily.  Lopressor increased to 50 mg twice daily.  Tachycardic with heart rate 110.  Chest tube removed.  Remains on amiodarone drip at 0.5.  8/20/2024: Increase beta-blocker to 75 mg twice daily.  EKG.  Amiodarone drip at 0.5.  Electrolyte replacement.  Remains on 4.5 L nasal cannula oxygen.  Lasix 40 mg IV twice daily.  8/21/24: Patient down to 2 L nasal cannula of oxygen.  Remains on Lasix 40 mg IV twice daily.  Lopressor  at 75 mg twice daily.  Amiodarone drip at 0.5.  Obtain EKG today to assess QTc.  Possible discharge tomorrow  8/22/24: Patient remains on 2 L nasal cannula of oxygen.  Remains on 40 mg Lasix IV twice daily.  Metoprolol switched to Toprol  mg daily.  Discontinue amiodarone drip.  Start amiodarone 200 mg daily.  Obtain EKG to assess QTc.    Vital Signs: /63   Pulse 91   Temp 98.2 °F (36.8 °C) (Oral)   Resp 20   Ht 1.778 m (5' 10\")   Wt 136.1 kg (300 lb)   SpO2 90%   BMI 43.05 kg/m²  O2 Flow Rate (L/min): 2 L/min   Admit Weight: Weight - Scale: 135.6 kg (299 lb)   WEIGHTWeight - Scale: 136.1 kg (300 lb)     I/O's:  I/O last 3 completed shifts:  In: 2165.9 [P.O.:720; I.V.:898.4; IV Piggyback:547.5]  Out: 3800 [Urine:3800]    Data:    CBC:   Recent Labs     08/20/24 0346 08/21/24 0438 08/22/24  0415   WBC 8.6 7.7 8.8   HGB 10.3* 10.8* 11.1*   HCT 33.3* 33.5* 34.8*   MCV 90.7 89.1 88.8    244 283     BMP:   Recent Labs     08/20/24 0346 08/21/24 0438 08/22/24  0415    140 140   K 4.3 4.2 4.3    102 100   CO2 26 31 32   PHOS 3.0 3.4 3.7   BUN 22 21 22   CREATININE 0.9 0.8* 1.0     PT/INR:   Recent Labs     08/20/24 0346 08/21/24  0438 08/22/24  0415   PROTIME 15.0* 14.5 14.1   INR 1.1 1.1 1.1     APTT:   Recent Labs     08/20/24 0346 08/21/24  0438 08/22/24  0415   APTT 41.8* 36.5 31.8       Chest X-Ray:   Chest X-ray     INDICATION: Postoperative examination.     COMPARISON:  None     TECHNIQUE: PA and lateral views of the chest were obtained.     IMPRESSION:  A small to moderate left pleural effusion remains, with adjacent atelectasis.  No evidence of pulmonary edema.  Lungs otherwise appear clear.     No right pleural effusion is seen.  No pneumothorax is evident.     Stable positioning of the right PICC line.     The cardiomediastinal silhouette is unchanged.      Scheduled Meds:    amiodarone  200 mg Oral Daily    metoprolol succinate  75 mg Oral Daily    sodium chloride

## 2024-08-22 NOTE — PROGRESS NOTES
Physical Therapy    Physical Therapy Treatment Note  Name: Mark Bourgeois MRN: [id number] :   1955   Date:  2024   Admission Date: 2024 Room:  -A   Restrictions/Precautions:          Fall Risk, sternal precautions   Communication with other providers:  Hand off with Nurse    Subjective:  Patient states:  Pt. Agreeable to work with therapy  Pain:   Location, Type, Intensity (0/10 to 10/10):  no c/o pain.   Objective:    Observation:  Pt. Up in recliner upon arrival.   Objective Measures:  SpO2 >90% throughout session, room air  Treatment, including education/measures:  Therapeutic Activity Training:   Therapeutic activity training was instructed today.  Cues were given for safety, sequence, UE/LE placement, awareness, and balance.    Activities performed today included bed mobility training, sup-sit, sit-stand    Bed Mobility: sit ->supine - Dennis for LE   Boost to HOB  with ModAx2  Sit to stand transfer: CGA    Sitting balance:SBA at EOB with feet on floor.  Standing balance:CGA-SBA with no AD    Gait Training:  Cues were given for safety, sequence, device management, balance, posture, awareness, path.    Amb 3h468oy, CGA-SBA, no AD.  Stair navigation training x5 steps, CGA, L hand rail.      Education:   Pt. Educated on importance of out of bed activity, safe functional mobility, and walker management.     Assessment / Impression:    Pt. Left supine in bed at end of session, all needs met, phone and call light in reach, bed/personal alarm active, and nursing updated.     Patient's tolerance of treatment:  Good   Adverse Reaction: none  Significant change in status and impact:  none  Barriers to improvement:  none  Plan for Next Session:    Cont per POC and progress as able.   Timed Code Treatment Minutes: 39 Minutes  PT Individual Minutes  Time In: 1312  Time Out: 1351  Minutes: 39              Previously filed items:  Social/Functional History  Lives With: Daughter, Other (comment) (2

## 2024-08-22 NOTE — PROGRESS NOTES
Lovenox, [x]  Heparin, [] SCDs, [x] Ambulation,  [] Eliquis, [] Xarelto   Code Status Full Code   Surrogate Decision Maker/ POA  Jenna Tripathi     Personally reviewed Lab Studies and Imaging     Drugs that require monitoring for toxicity include furosemide and heparin and the method of monitoring was CBC and CMP    Subjective:     Patient is awake and alert. He reports doing well with PT/OT. He is sitting up in the bedside chair this morning and having conversations and noted to be saturating 93% on RA. Nursing at bedside.    Review of Systems:    Review of Systems    As above    Objective:     Intake/Output Summary (Last 24 hours) at 8/22/2024 0712  Last data filed at 8/22/2024 0500  Gross per 24 hour   Intake 921.44 ml   Output 3000 ml   Net -2078.56 ml      Vitals:   Vitals:    08/22/24 0400 08/22/24 0438 08/22/24 0500 08/22/24 0600   BP: 136/75  125/63    Pulse: 94  91    Resp: 17 20 20    Temp:   98.2 °F (36.8 °C)    TempSrc:   Oral    SpO2: 94%  90%    Weight:    136.1 kg (300 lb)   Height:           Medications Prior to Admission     Prior to Admission medications    Medication Sig Start Date End Date Taking? Authorizing Provider   atorvastatin (LIPITOR) 40 MG tablet Take 2 tablets by mouth daily 5/23/24  Yes Maggie Kauffman APRN - CNP   metoprolol succinate (TOPROL XL) 200 MG extended release tablet Take 1 tablet by mouth at bedtime 5/16/24  Yes Maggie Kauffman APRN - CNP   amLODIPine (NORVASC) 10 MG tablet Take 1 tablet by mouth daily 4/4/24  Yes Hyacinth Garcia MD   famotidine (PEPCID) 40 MG tablet Take 1 tablet by mouth 2 times daily 4/4/24  Yes Hyacinth Garcia MD   aspirin 81 MG EC tablet Oral 7/30/15  Yes Provider, MD Magdiel   triamterene-hydroCHLOROthiazide (MAXZIDE-25) 37.5-25 MG per tablet Take 1 tablet by mouth daily as needed (swelling)  Patient not taking: Reported on 4/4/2024 2/20/24   Maggie Kauffman APRN - CNP   magnesium oxide (MAG-OX) 400 MG tablet Take 1 tablet by mouth  flushed, pulled back across the aortic valve revealing no gradient. The catheter was removed. There were no complications. Patient tolerated the procedure well. The patient was transferred to the holding area in stable condition. Findings are reviewed with patient and discussed with the family.  Questions answered. Blood Loss : 5 cc Results: Hemodynamic findings: Please see the full hemodynamics from the cath lab report Left ventricular pressure 136/36 mmHg, LVEDP was 16 mmHG Aortic pressure 135/74 mmHg Coronary anatomy: Right /  Dominant system The left main coronary artery has no significant disease. Left anterior descending artery is a type III .  Proximal LAD is diffusely diseased is about 6070% long lesion distally it is a type III vessel and gives collateral to the right coronary artery Circumflex artery has no significant disease.Om branch further bifurcates , the OM 1 has a 70 to 80% proximal stenosis The right coronary artery is a dominant vessel , the ostial RCA is 99% stenosed and may be a chronic total occlusion as distally RCA is a large vessel it is filled by collaterals from the left side After reviewing the anatomy we decided to proceed with RFR of the LAD XB 3 oh guide was used to engage the left main RFR wire was equalized and advanced into the LAD.  RFR was calculated X 3 to be 0.84 x 0.83 and 0.84 Impression: 70 to 80% proximal LAD stenosis 70 to 80% first OM stenosis 100% occluded possible  or 99% stenosis of the ostial RCA RCA is filled by collaterals from the left side RFR of the LAD is 0.84 Right subclavian artery is 100% occluded  Recommendations: Surgical evaluation for CABG Aggressive risk and lifestyle modification Findings are reviewed and discussed  with patient and family. Questions are answered.  Post procedure activity restrictions and continued risk modification and follow up recommended. Derek Scott MD, 8/14/2024 4:11 PM     XR CHEST (2 VW)    Result Date:

## 2024-08-22 NOTE — TELEPHONE ENCOUNTER
Per RAFAEL Max- Mark Bourgeois needs f/u with [facility] 8/29/24 s/p CABG with CXR and labs. He has EVH site erythema/warmth so I started abx for a week so just be mindful to check that in a week.    Appointment scheduled on 8/29/24 at 1200. Gracilea is aware.

## 2024-08-22 NOTE — PROGRESS NOTES
Cardiology Progress Note     Admit Date:  8/14/2024    Consult reason/ Seen today for :       Subjective and  Overnight Events :  got very sob today   Post CABG on 8/16/2024 with LIMA to LAD SVG to OM and SVG to PDA  Still quite swollen complaining of leg swelling but was able to walk    Chief complain on admission : 69 y.o.year old who is admitted forNo chief complaint on file.     Assessment / Plan:  ASCVD: Post CABG continue supportive care as per primary ct surgery started on Plavix as per CT surgery  He is short of breath hypoxic consider starting diuresis   Increase toprol Xl 100 mg daily , change to po amiodarone 200 mg    Echo showed preserved EF mildly enlarged left atrium right ventricle was dilated monitor closely  HTN: stable, continue To titrate up medication as needed  DVT Prophylaxis if no contraindication  Discussed with primary team, hospitalist service, bedside nursing staff and family  Past medical history:    has a past medical history of Hypertension.  Past surgical history:   has a past surgical history that includes Vasectomy; Total knee arthroplasty (Right); Tympanostomy tube placement (Left); Umbilical hernia repair; sinus surgery; Cardiac procedure (N/A, 8/14/2024); and Coronary artery bypass graft (N/A, 8/16/2024).  Social History:   reports that he has never smoked. He has never used smokeless tobacco. He reports that he does not drink alcohol and does not use drugs.  Family history:  family history includes Breast Cancer in his mother; Heart Failure in his father; Kidney Disease in his daughter; No Known Problems in his sister and sister.    No Known Allergies    Review of Systems:    All 14 systems were reviewed and are negative  Except for the positive findings  which as documented     /64   Pulse 92   Temp 98.1 °F (36.7 °C) (Oral)   Resp 20   Ht 1.778 m (5' 10\")   Wt 136.1 kg (300 lb)   SpO2 91%

## 2024-08-23 ENCOUNTER — APPOINTMENT (OUTPATIENT)
Dept: GENERAL RADIOLOGY | Age: 69
DRG: 233 | End: 2024-08-23
Attending: INTERNAL MEDICINE
Payer: COMMERCIAL

## 2024-08-23 VITALS
RESPIRATION RATE: 23 BRPM | HEART RATE: 89 BPM | HEIGHT: 70 IN | DIASTOLIC BLOOD PRESSURE: 89 MMHG | OXYGEN SATURATION: 95 % | WEIGHT: 300 LBS | TEMPERATURE: 98.5 F | SYSTOLIC BLOOD PRESSURE: 101 MMHG | BODY MASS INDEX: 42.95 KG/M2

## 2024-08-23 LAB
ANION GAP SERPL CALCULATED.3IONS-SCNC: 7 MMOL/L (ref 7–16)
APTT: 32.4 SECONDS (ref 25.1–37.1)
BUN SERPL-MCNC: 24 MG/DL (ref 6–23)
CALCIUM IONIZED: NORMAL MMOL/L (ref 1.12–1.32)
CALCIUM SERPL-MCNC: 8.4 MG/DL (ref 8.3–10.6)
CHLORIDE BLD-SCNC: 100 MMOL/L (ref 99–110)
CO2: 32 MMOL/L (ref 21–32)
CREAT SERPL-MCNC: 1 MG/DL (ref 0.9–1.3)
EKG ATRIAL RATE: 101 BPM
EKG DIAGNOSIS: NORMAL
EKG P AXIS: 39 DEGREES
EKG P-R INTERVAL: 140 MS
EKG Q-T INTERVAL: 308 MS
EKG QRS DURATION: 78 MS
EKG QTC CALCULATION (BAZETT): 399 MS
EKG R AXIS: 41 DEGREES
EKG T AXIS: 118 DEGREES
EKG VENTRICULAR RATE: 101 BPM
FIBRINOGEN: 623 MG/DL (ref 170–540)
GFR, ESTIMATED: 81 ML/MIN/1.73M2
GLUCOSE BLD-MCNC: 115 MG/DL (ref 70–99)
GLUCOSE SERPL-MCNC: 125 MG/DL (ref 70–99)
HCT VFR BLD CALC: 33.2 % (ref 42–52)
HEMOGLOBIN: 10.7 GM/DL (ref 13.5–18)
INR BLD: 1 INDEX
MAGNESIUM: 2 MG/DL (ref 1.8–2.4)
MCH RBC QN AUTO: 28.5 PG (ref 27–31)
MCHC RBC AUTO-ENTMCNC: 32.2 % (ref 32–36)
MCV RBC AUTO: 88.3 FL (ref 78–100)
PDW BLD-RTO: 13.9 % (ref 11.7–14.9)
PHOSPHORUS: 3.7 MG/DL (ref 2.5–4.9)
PLATELET # BLD: 296 K/CU MM (ref 140–440)
PMV BLD AUTO: 9.6 FL (ref 7.5–11.1)
POTASSIUM SERPL-SCNC: 4.3 MMOL/L (ref 3.5–5.1)
PROTHROMBIN TIME: 13.9 SECONDS (ref 11.7–14.5)
RBC # BLD: 3.76 M/CU MM (ref 4.6–6.2)
SODIUM BLD-SCNC: 139 MMOL/L (ref 135–145)
WBC # BLD: 8.1 K/CU MM (ref 4–10.5)

## 2024-08-23 PROCEDURE — 6360000002 HC RX W HCPCS: Performed by: PHYSICIAN ASSISTANT

## 2024-08-23 PROCEDURE — 83735 ASSAY OF MAGNESIUM: CPT

## 2024-08-23 PROCEDURE — 94669 MECHANICAL CHEST WALL OSCILL: CPT

## 2024-08-23 PROCEDURE — 2580000003 HC RX 258: Performed by: PHYSICIAN ASSISTANT

## 2024-08-23 PROCEDURE — 82330 ASSAY OF CALCIUM: CPT

## 2024-08-23 PROCEDURE — 6370000000 HC RX 637 (ALT 250 FOR IP): Performed by: PHYSICIAN ASSISTANT

## 2024-08-23 PROCEDURE — 93005 ELECTROCARDIOGRAM TRACING: CPT | Performed by: PHYSICIAN ASSISTANT

## 2024-08-23 PROCEDURE — 94640 AIRWAY INHALATION TREATMENT: CPT

## 2024-08-23 PROCEDURE — 85384 FIBRINOGEN ACTIVITY: CPT

## 2024-08-23 PROCEDURE — 93010 ELECTROCARDIOGRAM REPORT: CPT | Performed by: INTERNAL MEDICINE

## 2024-08-23 PROCEDURE — 82962 GLUCOSE BLOOD TEST: CPT

## 2024-08-23 PROCEDURE — 6370000000 HC RX 637 (ALT 250 FOR IP): Performed by: FAMILY MEDICINE

## 2024-08-23 PROCEDURE — 84100 ASSAY OF PHOSPHORUS: CPT

## 2024-08-23 PROCEDURE — 80048 BASIC METABOLIC PNL TOTAL CA: CPT

## 2024-08-23 PROCEDURE — 97116 GAIT TRAINING THERAPY: CPT

## 2024-08-23 PROCEDURE — 85027 COMPLETE CBC AUTOMATED: CPT

## 2024-08-23 PROCEDURE — 85730 THROMBOPLASTIN TIME PARTIAL: CPT

## 2024-08-23 PROCEDURE — 94761 N-INVAS EAR/PLS OXIMETRY MLT: CPT

## 2024-08-23 PROCEDURE — 85610 PROTHROMBIN TIME: CPT

## 2024-08-23 PROCEDURE — 94150 VITAL CAPACITY TEST: CPT

## 2024-08-23 PROCEDURE — 71046 X-RAY EXAM CHEST 2 VIEWS: CPT

## 2024-08-23 RX ORDER — POTASSIUM CHLORIDE 1500 MG/1
20 TABLET, EXTENDED RELEASE ORAL 2 TIMES DAILY
Qty: 30 TABLET | Refills: 0 | Status: SHIPPED | OUTPATIENT
Start: 2024-08-23

## 2024-08-23 RX ORDER — CLOPIDOGREL BISULFATE 75 MG/1
75 TABLET ORAL DAILY
Qty: 30 TABLET | Refills: 3 | Status: SHIPPED | OUTPATIENT
Start: 2024-08-23

## 2024-08-23 RX ORDER — CEPHALEXIN 500 MG/1
500 CAPSULE ORAL 4 TIMES DAILY
Qty: 26 CAPSULE | Refills: 0 | Status: SHIPPED | OUTPATIENT
Start: 2024-08-23 | End: 2024-08-30

## 2024-08-23 RX ORDER — ACETAMINOPHEN 500 MG
1000 TABLET ORAL EVERY 6 HOURS PRN
Qty: 120 TABLET | Refills: 3 | Status: SHIPPED | OUTPATIENT
Start: 2024-08-23

## 2024-08-23 RX ORDER — DOXYCYCLINE HYCLATE 100 MG
100 TABLET ORAL EVERY 12 HOURS SCHEDULED
Qty: 13 TABLET | Refills: 0 | Status: SHIPPED | OUTPATIENT
Start: 2024-08-23 | End: 2024-08-30

## 2024-08-23 RX ORDER — METOPROLOL SUCCINATE 100 MG/1
150 TABLET, EXTENDED RELEASE ORAL DAILY
Qty: 30 TABLET | Refills: 0 | Status: SHIPPED | OUTPATIENT
Start: 2024-08-23

## 2024-08-23 RX ORDER — TRAZODONE HYDROCHLORIDE 50 MG/1
25 TABLET, FILM COATED ORAL NIGHTLY PRN
Status: DISCONTINUED | OUTPATIENT
Start: 2024-08-23 | End: 2024-08-23 | Stop reason: HOSPADM

## 2024-08-23 RX ORDER — GABAPENTIN 300 MG/1
300 CAPSULE ORAL 3 TIMES DAILY
Qty: 90 CAPSULE | Refills: 0 | Status: SHIPPED | OUTPATIENT
Start: 2024-08-23 | End: 2024-09-22

## 2024-08-23 RX ORDER — METOPROLOL SUCCINATE 50 MG/1
50 TABLET, EXTENDED RELEASE ORAL ONCE
Status: COMPLETED | OUTPATIENT
Start: 2024-08-23 | End: 2024-08-23

## 2024-08-23 RX ORDER — FUROSEMIDE 40 MG
TABLET ORAL
Qty: 30 TABLET | Refills: 0 | Status: SHIPPED | OUTPATIENT
Start: 2024-08-23

## 2024-08-23 RX ORDER — GINSENG 100 MG
CAPSULE ORAL
Qty: 14 G | Refills: 3 | Status: SHIPPED | OUTPATIENT
Start: 2024-08-23 | End: 2024-09-02

## 2024-08-23 RX ORDER — TRAMADOL HYDROCHLORIDE 50 MG/1
50 TABLET ORAL EVERY 6 HOURS PRN
Qty: 10 TABLET | Refills: 0 | Status: SHIPPED | OUTPATIENT
Start: 2024-08-23 | End: 2024-09-02

## 2024-08-23 RX ORDER — TRAZODONE HYDROCHLORIDE 50 MG/1
25 TABLET, FILM COATED ORAL NIGHTLY PRN
Qty: 2 TABLET | Refills: 0 | Status: SHIPPED | OUTPATIENT
Start: 2024-08-23 | End: 2024-08-27

## 2024-08-23 RX ORDER — METOPROLOL SUCCINATE 50 MG/1
150 TABLET, EXTENDED RELEASE ORAL DAILY
Status: DISCONTINUED | OUTPATIENT
Start: 2024-08-23 | End: 2024-08-23 | Stop reason: HOSPADM

## 2024-08-23 RX ORDER — LACTOBACILLUS RHAMNOSUS GG 10B CELL
1 CAPSULE ORAL
Qty: 7 CAPSULE | Refills: 0 | Status: SHIPPED | OUTPATIENT
Start: 2024-08-23 | End: 2024-08-30

## 2024-08-23 RX ORDER — GUAIFENESIN 600 MG/1
600 TABLET, EXTENDED RELEASE ORAL 2 TIMES DAILY
Qty: 60 TABLET | Refills: 0 | Status: SHIPPED | OUTPATIENT
Start: 2024-08-23 | End: 2024-09-22

## 2024-08-23 RX ORDER — AMIODARONE HYDROCHLORIDE 200 MG/1
200 TABLET ORAL DAILY
Qty: 30 TABLET | Refills: 0 | Status: SHIPPED | OUTPATIENT
Start: 2024-08-23 | End: 2024-09-22

## 2024-08-23 RX ADMIN — CEPHALEXIN 500 MG: 250 CAPSULE ORAL at 12:58

## 2024-08-23 RX ADMIN — DOXYCYCLINE HYCLATE 100 MG: 100 TABLET, COATED ORAL at 09:30

## 2024-08-23 RX ADMIN — FAMOTIDINE 20 MG: 20 TABLET ORAL at 09:31

## 2024-08-23 RX ADMIN — POTASSIUM CHLORIDE 10 MEQ: 1500 TABLET, EXTENDED RELEASE ORAL at 09:31

## 2024-08-23 RX ADMIN — SENNOSIDES AND DOCUSATE SODIUM 1 TABLET: 50; 8.6 TABLET ORAL at 09:30

## 2024-08-23 RX ADMIN — METOPROLOL SUCCINATE 50 MG: 50 TABLET, EXTENDED RELEASE ORAL at 11:28

## 2024-08-23 RX ADMIN — SODIUM CHLORIDE, PRESERVATIVE FREE 10 ML: 5 INJECTION INTRAVENOUS at 08:00

## 2024-08-23 RX ADMIN — Medication 3 ML: at 07:42

## 2024-08-23 RX ADMIN — GABAPENTIN 300 MG: 300 CAPSULE ORAL at 09:30

## 2024-08-23 RX ADMIN — BACITRACIN: 500 OINTMENT TOPICAL at 08:00

## 2024-08-23 RX ADMIN — AMIODARONE HYDROCHLORIDE 200 MG: 200 TABLET ORAL at 09:30

## 2024-08-23 RX ADMIN — CLOPIDOGREL BISULFATE 75 MG: 75 TABLET ORAL at 09:32

## 2024-08-23 RX ADMIN — ASPIRIN 81 MG: 81 TABLET, CHEWABLE ORAL at 09:32

## 2024-08-23 RX ADMIN — TAMSULOSIN HYDROCHLORIDE 0.4 MG: 0.4 CAPSULE ORAL at 09:31

## 2024-08-23 RX ADMIN — HEPARIN SODIUM 5000 UNITS: 5000 INJECTION INTRAVENOUS; SUBCUTANEOUS at 07:17

## 2024-08-23 RX ADMIN — CEPHALEXIN 500 MG: 250 CAPSULE ORAL at 09:31

## 2024-08-23 RX ADMIN — Medication 1 TABLET: at 09:31

## 2024-08-23 RX ADMIN — ACETAMINOPHEN 1000 MG: 500 TABLET ORAL at 07:17

## 2024-08-23 RX ADMIN — TRAZODONE HYDROCHLORIDE 25 MG: 50 TABLET ORAL at 00:07

## 2024-08-23 RX ADMIN — Medication 1 CAPSULE: at 09:29

## 2024-08-23 RX ADMIN — METOPROLOL SUCCINATE 150 MG: 50 TABLET, EXTENDED RELEASE ORAL at 09:32

## 2024-08-23 RX ADMIN — GUAIFENESIN 600 MG: 600 TABLET, EXTENDED RELEASE ORAL at 09:31

## 2024-08-23 RX ADMIN — FUROSEMIDE 40 MG: 10 INJECTION, SOLUTION INTRAMUSCULAR; INTRAVENOUS at 09:29

## 2024-08-23 ASSESSMENT — PAIN SCALES - GENERAL
PAINLEVEL_OUTOF10: 0
PAINLEVEL_OUTOF10: 0

## 2024-08-23 ASSESSMENT — PAIN SCALES - WONG BAKER
WONGBAKER_NUMERICALRESPONSE: NO HURT
WONGBAKER_NUMERICALRESPONSE: NO HURT

## 2024-08-23 NOTE — PROGRESS NOTES
1301: this RN educated pt on discharge instructions by utilizing the teach-back method. Pt instructed to weigh themselves daily; preferably at the same time each day, after voiding, and before eating. This RN emphasized the importance of medication compliance; we reviewed medication modifications. Pt instructed to assess HR and BP prior to taking medications. This RN informed pt to hold blood pressure medication and notify physician, if HR is less than 60 bpm or SBP is less than 100 mmHg. Pt instructed to gently cleanse incisions with antibacterial soap; pat site dry, leaving it open to air, and to apply bacitracin bid. This RN educated to notify physician if any of the following s/s of infection arise, such as: redness, swelling, warmth-to-touch or painful-to-touch, purulent drainage, bleeding at site, odor, a temperature of 101 degrees fahrenheit or greater. We reviewed diet modifications, activity restrictions, and upcoming follow-up appointments. All questions and concerns addressed at this time. This RN provided CVICU Charge RN phone number for any further questions.

## 2024-08-23 NOTE — DISCHARGE SUMMARY
Cardiothoracic and Vascular Surgery Discharge Summary  Today's Date: 24  Name:  Mark Bourgeois /Age/Sex: 1955  (69 y.o. male)   MRN & CSN:  [id number] & [id number] Admission Date/Time: 2024 11:13 AM   Location:  -A PCP: Hyacinth Garcia MD       Admit date: 2024   Discharge date: 2024      Admitting Provider: Derek Scott MD   Discharge Provider: Graciela Chavez PA-C     Discharge Diagnoses:   Active Hospital Problems    Diagnosis Date Noted    ASCVD (arteriosclerotic cardiovascular disease) [I25.10] 2024    Abnormal nuclear stress test [R94.39] 2024     Discharged Condition: stable.    Operation    S/p CABGx3 (LIMA-LAD, SVG-OM1, SVG-PDA) on 2024.     Hospital Course:  24-08/15/24: Patient was admitted to the hospital by cardiology after Holter monitor found sinus tachycardia with PVCs.  He underwent a stress test that was positive for inferior wall ischemia.  He underwent left heart catheterization that revealed severe multivessel coronary artery disease therefore CT surgery team was consulted.  Preoperative testing was completed.  Patient was medically optimized preoperatively for surgery.  24: Patient underwent surgical intervention.  Tolerated the procedure well.  See op note for full details.  Transferred to the ICU in a stable condition.  Weaned off ventilator to oxygen.  24: remove arterial line, keep cordis and chest tubes, start lasix 20mg IV BID and metoprolol 25mg PO BID, wean O2   24: place PICC and remove cordis, increase lasix to 40mg IV BID d/t weight up and edematous on exam, start amio gtt d/t tachycardia in 120's with concern for conversion to afib, remove mejía, possibly remove pleural chest tube later this morning if output minimal, increase metoprolol to 37.5mg   2024: Patient is on 6 L nasal cannula of oxygen.  Electrolyte replacement.  Diuresis with Lasix 40 mg IV twice daily.  Lopressor increased to 50  having problems. It's also a good idea to know your test results and keep a list of the medicines you take.  When should you call for help?   Call 911 anytime you think you may need emergency care. For example, call if:    You passed out (lost consciousness).     You have severe trouble breathing.     You have sudden chest pain and shortness of breath, or you cough up blood.     You have severe pain in your chest.     You have symptoms of a heart attack. These may include:  Chest pain or pressure, or a strange feeling in the chest.  Sweating.  Shortness of breath.  Nausea or vomiting.  Pain, pressure, or a strange feeling in the back, neck, jaw, or upper belly or in one or both shoulders or arms.  Lightheadedness or sudden weakness.  A fast or irregular heartbeat.  After you call 911, the  may tell you to chew 1 adult-strength or 2 to 4 low-dose aspirin. Wait for an ambulance. Do not try to drive yourself.     You have angina symptoms (such as chest pain or pressure) that do not go away with rest or are not getting better within 5 minutes after you take a dose of nitroglycerin.     You have symptoms of a stroke. These may include:  Sudden numbness, tingling, weakness, or loss of movement in your face, arm, or leg, especially on only one side of your body.  Sudden vision changes.  Sudden trouble speaking.  Sudden confusion or trouble understanding simple statements.  Sudden problems with walking or balance.  A sudden, severe headache that is different from past headaches.   Call your doctor now or seek immediate medical care if:    You have pain that does not get better after you take pain medicine.     You have loose stitches, or your incision comes open.     You are bleeding a lot from the incision.     You have signs of infection, such as:  Increased pain, swelling, warmth, or redness.  Red streaks leading from the incision.  Pus draining from the incision.  A fever.     Your heartbeat feels very fast,  skips beats, or flutters.     You have signs of a blood clot in a leg. If you had a vein removed from your leg, you may have tenderness and swelling while your leg heals. But signs of a blood clot may be in a different part of your leg and may include:  Pain in your calf, back of the knee, thigh, or groin.  Redness and swelling in your leg or groin.     You have symptoms of heart failure, such as:  Swelling in your legs, ankles, or feet.  Sudden weight gain, such as more than 2 to 3 pounds in a day or 5 pounds in a week. (Your doctor may suggest a different range of weight gain.)     You are sick to your stomach or cannot keep fluids down.   Watch closely for changes in your health, and be sure to contact your doctor if:    You do not get better as expected.                     Signed: Graciela Chavez PA-C 12:44 PM 08/23/24

## 2024-08-23 NOTE — PROGRESS NOTES
1253: pt educated on appropriate breathing exercises for triple lumen CVC removal; pt demonstrated understanding. Site cleansed with CHG; sutures removed intact. Triple Lumen CVC removed at this time. Rico pressure held for 5 minutes; dry gauze secured by silk cloth tape. No complications, pt tolerated well; HR 92 bpm, NSR with no ectopy.

## 2024-08-23 NOTE — PROGRESS NOTES
1157: pt educated on appropriate breathing exercises for pacing wire removal; pt demonstrated understanding. Site cleansed with betadine. Sutures removed intact. ventricular removed at this time without any resistance. Dry gauze secured by silk cloth tape. No complications, pt tolerated well; HR 93 bpm, NSR.

## 2024-08-23 NOTE — PROGRESS NOTES
Physical Therapy    Physical Therapy Treatment Note  Name: Mark Bourgeois MRN: [id number] :   1955   Date:  2024   Admission Date: 2024 Room:  A   Restrictions/Precautions:        Sternal, no pushing, pulling or lifting more than 5 pounds for the first 2 weeks and then 10 pounds up to 3 months,   Arms are to support chest when changing position, coughing or sneezing. No lifting arms above 90 degrees except with the ex's  Communication with other providers:  Annia RN states pt is ok to see for therapy  Subjective:  Patient states:  \"I'm going home today\"  Pain:   Location, Type, Intensity (0/10 to 10/10):  3/10 sternum  Objective:    Observation:  Pt. Up in recliner upon arrival.   Treatment, including education/measures:  Vital Signs  HR: 103, B/P 134/76, O2 93  Education:  Pt was instructed in Sternal Precautions, Purpose of Exercise Program, Candelaria Scale and Signs and Symptoms of Exercise Intolerance per Cardiac Protocol  Transfers with line management of Tele and SpO2  Sit to stand :CGA and VC's for sternal precautions  Stand to sit :CGA and VC's for sternal precautions    Gait:  Pt amb for 450 ft with CGA-SBA, no AD    Safety  Patient left safely in the recliner, with call light/phone in reach with alarm applied. Gait belt was used for transfers and gait.  Assessment / Impression:    Patient's tolerance of treatment:  Good   Adverse Reaction: none  Significant change in status and impact:  Increased HR up to 120 with ambulation.   Barriers to improvement:  None  Plan for Next Session:    Will cont to progress ex's and gt per cardiac protocol and educate pt on sternal precautions, S & S of ex intolerance, purpose of ex's, progression of ex's and gt at home.   Time in:  0945   Time out:  1010  Timed treatment minutes:  25  Total treatment time:  25  Previously filed items:  Social/Functional History  Lives With: Daughter, Other (comment) (2 grandsons)  Type of Home: House  Home Layout:

## 2024-08-23 NOTE — PROGRESS NOTES
4 Eyes Skin Assessment     NAME:  Mark Bourgeois  YOB: 1955  MEDICAL RECORD NUMBER:  [id number]    The patient is being assessed for  Shift Handoff    I agree that at least one RN has performed a thorough Head to Toe Skin Assessment on the patient. ALL assessment sites listed below have been assessed.      Areas assessed by both nurses:    Head, Face, Ears, Shoulders, Back, Chest, Arms, Elbows, Hands, Sacrum. Buttock, Coccyx, Ischium, Legs. Feet and Heels, and Under Medical Devices         Does the Patient have a Wound? Yes       Jacek Prevention initiated by RN: Yes  Wound Care Orders initiated by RN: Yes    Pressure Injury (Stage 3,4, Unstageable, DTI, NWPT, and Complex wounds) if present, place Wound referral order by RN under : No    New Ostomies, if present place, Ostomy referral order under : No     Nurse 1 eSignature: Electronically signed by Annia Chavira RN on 8/23/24 at 07:30 AM EDT    **SHARE this note so that the co-signing nurse can place an eSignature**    Nurse 2 eSignature: Electronically signed by Roopa Shafer RN on 8/23/24 at 7:32 AM EDT

## 2024-08-23 NOTE — DISCHARGE INSTRUCTIONS
Cardiac Surgery Discharge Instructions:    DIET  Fat and cholesterol allowances are made for strength and healing purposes. Adequate caloric and protein intake is essential for wound healing; fiber and hydration aid in bowel motility. Follow a low fat, low sodium, high fiber, high protein diet.  NO SALT is to be used when cooking or added at the table. Restrict sodium intake to 1500 mg daily. We encourage lean meats, such as: lean pork, turkey, chicken, and fish. Meats should be baked, broiled, roasted, or grilled, NEVER fried. Try to eat more whole foods. Example 1) Eat a bake potato, instead of french fries. Example 2) Eat an apple, instead of apple pie. Avoid caffeinated beverages.    Stay away from the SALTY SEVEN:  1) BREAD  2) PIZZA  3) SANDWICHES  4) COLD CUTS or CURED MEATS  5) SOUPS of any kind / CANNED foods  6) BURRITOS or TACOS  7) CHEESE of any kind (swiss has the least amount of sodium)    ACTIVITY  Weigh yourself each day, at the same, after you void. Call your doctor for weight gain of 3 lbs in 1 day or 5 lbs in 3 days.  Must be up in chair during meal and/or snack times.   Put on your white stockings (MASSIEL hose) every morning and remove them at night.  Keep your legs elevated when sitting.  Remember to use your incentive spirometer every hour, 10 times an hour, while awake. Do not do this rapidly! Remember quality is better than quantity.  Take it easy for the next two weeks; no heavy lifting or work.   Gradually increase activity as tolerated. Plan rest periods, but do NOT sit in the same position for more than 2 hours.  Do the exercises that Physical Therapy showed you three times a day.  You may climb stairs when you feel strong enough.   You may resume sexual activity when you can climb two flights of stairs without getting short of breath.    For the next two weeks, avoid any heavy lifting, pushing, or pulling objects. Do not lift anything greater than a gallon of milk.  Avoid activity that  CARE  Inspect your incisions daily for signs and symptoms of infection. If you develop chills, a temperature of 101 degrees fahrenheit or greater, redness, lumps, warmth-to-touch or painfulness-to-touch, or pus-like drainage, call your doctor.  Gently cleanse your incisions with antibacterial soap, such as: Dial or SafeGuard twice a day and pat dry. The bar of soap should only be used on the surgical incisions. Use separate wash cloths on each incision to prevent cross contamination. Use a different wash cloth for the rest of your body.  Apply bacitracin.   Leave open to air.  Do NOT apply any lotions, creams, oils, or lubricants to your incisions.  NO baths, hot tubs, sauna, or submerging your body into water until your ALL incisions are fully healed.   Incision sites may swell. Elevating your limb may help.     VALVE CARE  Notify your primary care physician and your specialists (including dentist) that you've had valve surgery. You may need antibiotics prior to any upcoming procedures.  Regular dental care is important. Notify your dentist of any valve surgery. Wait 3 months post-surgery before any elective dental surgery.  If you have an MRI, let technician know that you've had valve surgery.    Home Record Sheet    Every day, twice a day, you should assess your blood pressure. You should check your blood pressure once in the morning and once in the evening, prior to taking medication. You should also weigh yourself daily; at the same time each day, preferably after you urinate and before you eat. If you have a weight increase of 3 lbs in one day or 5 lbs in 3 days, call [phone].    Date Time Systolic  (upper value)  Diastolic  (lower value)  Weight today Pulse  BM                                                                                                                                                              Weight Gain accompanied by the following symptoms may indicate that you are in Heart  Failure.         NOTIFY DOCTOR  If you have any of the follow symptoms above or swelling in the: feet, ankles, or abdomen; an ongoing cough, fatigue, and/or loss of energy.  If any incision and/or puncture sites begin to bleed, and if the bleeding persists.  If you have a weight gain of more than 3 lbs in 1 day or 5 lbs in 3 days.   If your bowel movements are sluggish or have stopped.  If you have difficulty breathing.  If you develop chest pain or the same type of chest discomfort that you experienced before surgery.    If you are having symptoms of a heart attack or stroke, CALL 911 IMMEDIATELY. DO NOT DRIVE YOURSELF.    For non-emergency questions, [facility] CVICU [phone]     Inpatient [facility] Cardiac Wellness  Monday- Friday, 8a-5p  [phone]  Leave a message and they will return your call in a timely manner.    Outpatient [facility] Cardiac Rehab  They will contact you within 6-8 weeks after you're discharged.  [phone]    [place]  There is a support group meeting that is held here at [facility] on the Ground level in Assembly Room A from 6-8p on every 3rd Tuesday. All cardiac patients are encouraged and welcomed to attend. For more information, please contact: [phone]. There aren't any meetings in June or July.     Thank you for choosing [facility] for your Cardiothoracic Care! You may receive a telephone call from the Cardio Vascular Intensive Care Unit within 48 hours of discharge inquiring as to your health and care received during your hospitalization. Please bring these instructions and a list of you medications  with you to your doctors' appointments.

## 2024-08-23 NOTE — PLAN OF CARE
Problem: Discharge Planning  Goal: Discharge to home or other facility with appropriate resources  8/23/2024 1254 by Annia Chavira RN  Outcome: Adequate for Discharge  8/23/2024 1133 by Annai Chavira RN  Outcome: Progressing  Flowsheets  Taken 8/23/2024 1100  Discharge to home or other facility with appropriate resources: Identify barriers to discharge with patient and caregiver  Taken 8/23/2024 0800  Discharge to home or other facility with appropriate resources: Identify barriers to discharge with patient and caregiver     Problem: ABCDS Injury Assessment  Goal: Absence of physical injury  8/23/2024 1254 by Annia Chavira RN  Outcome: Adequate for Discharge  8/23/2024 1133 by Annia Chavira RN  Outcome: Progressing     Problem: Safety - Adult  Goal: Free from fall injury  8/23/2024 1254 by Annia Chavira RN  Outcome: Adequate for Discharge  8/23/2024 1133 by Annia Chaviar RN  Outcome: Progressing     Problem: Pain  Goal: Verbalizes/displays adequate comfort level or baseline comfort level  8/23/2024 1254 by Annia Chavira RN  Outcome: Adequate for Discharge  8/23/2024 1133 by Annia Chavira RN  Outcome: Progressing  Flowsheets  Taken 8/23/2024 1100  Verbalizes/displays adequate comfort level or baseline comfort level: Assess pain using appropriate pain scale  Taken 8/23/2024 0800  Verbalizes/displays adequate comfort level or baseline comfort level: Assess pain using appropriate pain scale     Problem: Skin/Tissue Integrity  Goal: Absence of new skin breakdown  Description: 1.  Monitor for areas of redness and/or skin breakdown  2.  Assess vascular access sites hourly  3.  Every 4-6 hours minimum:  Change oxygen saturation probe site  4.  Every 4-6 hours:  If on nasal continuous positive airway pressure, respiratory therapy assess nares and determine need for appliance change or resting period.  8/23/2024 1254 by Annia Chavira RN  Outcome: Adequate for Discharge  8/23/2024 1133 by Annia Chavira RN  Outcome:  and Symptoms of Infection: ADMISSION and DAILY: Assess and document risk factors for pressure ulcer development  Taken 8/23/2024 0800  Incisions, Wounds, or Drain Sites Healing Without Sign and Symptoms of Infection: ADMISSION and DAILY: Assess and document risk factors for pressure ulcer development  Goal: Oral mucous membranes remain intact  8/23/2024 1254 by Annia Chavira RN  Outcome: Adequate for Discharge  8/23/2024 1133 by Annia Chavira RN  Outcome: Progressing  Flowsheets  Taken 8/23/2024 1100  Oral Mucous Membranes Remain Intact: Assess oral mucosa and hygiene practices  Taken 8/23/2024 0800  Oral Mucous Membranes Remain Intact: Assess oral mucosa and hygiene practices     Problem: Musculoskeletal - Adult  Goal: Return mobility to safest level of function  8/23/2024 1254 by Annia Chavira RN  Outcome: Adequate for Discharge  8/23/2024 1133 by Annia Chavira RN  Outcome: Progressing  Flowsheets  Taken 8/23/2024 1100  Return Mobility to Safest Level of Function: Assist with transfers and ambulation using safe patient handling equipment as needed  Taken 8/23/2024 0800  Return Mobility to Safest Level of Function: Assist with transfers and ambulation using safe patient handling equipment as needed  Goal: Maintain proper alignment of affected body part  8/23/2024 1254 by Annia Chavira RN  Outcome: Adequate for Discharge  8/23/2024 1133 by Annia Chavira RN  Outcome: Progressing  Flowsheets  Taken 8/23/2024 1100  Maintain proper alignment of affected body part: Support and protect limb and body alignment per provider's orders  Taken 8/23/2024 0800  Maintain proper alignment of affected body part: Support and protect limb and body alignment per provider's orders  Goal: Return ADL status to a safe level of function  8/23/2024 1254 by Annia Chavira RN  Outcome: Adequate for Discharge  8/23/2024 1133 by Annia Chavira RN  Outcome: Progressing  Flowsheets  Taken 8/23/2024 1100  Return ADL Status to a Safe Level of  Function: Assess activities of daily living deficits and provide assistive devices as needed  Taken 8/23/2024 0800  Return ADL Status to a Safe Level of Function: Assess activities of daily living deficits and provide assistive devices as needed     Problem: Gastrointestinal - Adult  Goal: Minimal or absence of nausea and vomiting  8/23/2024 1254 by Annia Chavira RN  Outcome: Adequate for Discharge  8/23/2024 1133 by Annia Chavira RN  Outcome: Progressing  Goal: Maintains or returns to baseline bowel function  8/23/2024 1254 by Annia Chavira RN  Outcome: Adequate for Discharge  8/23/2024 1133 by Annia Chavira RN  Outcome: Progressing  Goal: Maintains adequate nutritional intake  8/23/2024 1254 by Annia Chavira RN  Outcome: Adequate for Discharge  8/23/2024 1133 by Annia Chavira RN  Outcome: Progressing

## 2024-08-23 NOTE — PROGRESS NOTES
V2.0  [place] Consult Note      Name:  Mark Bourgeois /Age/Sex: 1955  (69 y.o. male)   MRN & CSN:  [id number] & [id number] Encounter Date/Time: 2024 5:17 PM EDT   Location:  -A PCP: Hyacinth Garcia MD     Attending:Eddie Zavala MD  Consulting Provider: Lu Carver MD      Hospital Day: 10    Assessment and Recommendations   Mark Bourgeois is a 69 y.o. male who presents with Abnormal nuclear stress test    Recommendations:  CAD s/p 3V CABG   ARF w/ hypoxia  Left heart cath : Proximal LAD stenosis of 70 to 80%, first OM stenosis of 70 to 80%, 100% occluded possible  or 99% stenosis of ostial RCA, RCA filled by collaterals from the left, right subclavian artery is 100% occluded  --Completed CT chest, BLE vein mapping and carotid duplex  --Echo 8/15: EF 55-60% indeterminate diastolic function, mod to severely dilated RV, mild TR  --He is s/p CABG with CT surgery who is his primary. POD 7  --Cardiology following, appreciate recs. Planned for cardiac rehanb  --Continue metoprolol 25mg BID, Aspirin and statin daily  --Continue IV diuresis, plan to transition to PO as appropriate, defer to CT surgery/cardio,   --Transitioned from IV amio to PO   --Chest tube removed 3 days ago, patient tolerated well, wean O2 as able, currently on RA  --Patient tolerating therapy. Started on bacitracin, keflex and doxy for soft tissue/skin infection as the site of his harvested veins is mildly erythematous (no noted drainage or warmth)    #Hypophosphatemia  Phos: 4.3  Replete and monitor    HTN  --Continue metoprolol and furosemide. Amlodipine discontinued?    Lung nodule x2  3.7 cm x 2.4 cm nodule at the L paratracheal location (thyroid nodule vs mediastinal adenopathy) and 8 mm nodule of RLL  --CT and/or thyroid US as outpatient. Repeat CT chest in 6-12 months to reassess RLL nodule    Class III obesity  BMI 41.72  --Low HDL, A1c: 5.8%  --Will need counseling for weight reduction and lifestyle  modifications    GERD  --Continue famotidine      Diet ADULT DIET; Regular; 5 carb choices (75 gm/meal); Low Fat/Low Chol/High Fiber/2 gm Na   DVT Prophylaxis [] Lovenox, [x]  Heparin, [] SCDs, [x] Ambulation,  [] Eliquis, [] Xarelto   Code Status Full Code   Surrogate Decision Maker/ POA  Jenna Tripathi     Personally reviewed Lab Studies and Imaging     Drugs that require monitoring for toxicity include furosemide and heparin and the method of monitoring was CBC and CMP    Subjective:     Patient is awake and alert. He reports doing well with PT/OT. He is sitting up in the bedside chair this morning and reports he was told he would be discharged today. He is endorsing feeling better and stronger. Discussed POC and he is amenable.    Review of Systems:    Review of Systems    As above    Objective:     Intake/Output Summary (Last 24 hours) at 8/23/2024 0722  Last data filed at 8/23/2024 0000  Gross per 24 hour   Intake 979.57 ml   Output 1900 ml   Net -920.43 ml      Vitals:   Vitals:    08/22/24 2243 08/22/24 2300 08/23/24 0000 08/23/24 0100   BP:  139/68 120/68 129/69   Pulse:  100 99 100   Resp: 20 19 20 18   Temp:   98.3 °F (36.8 °C)    TempSrc:   Oral    SpO2:  91% 93% 90%   Weight:       Height:           Medications Prior to Admission     Prior to Admission medications    Medication Sig Start Date End Date Taking? Authorizing Provider   atorvastatin (LIPITOR) 40 MG tablet Take 2 tablets by mouth daily 5/23/24  Yes Maggie Kauffman APRN - CNP   metoprolol succinate (TOPROL XL) 200 MG extended release tablet Take 1 tablet by mouth at bedtime 5/16/24  Yes Maggie Kauffman APRN - CNP   amLODIPine (NORVASC) 10 MG tablet Take 1 tablet by mouth daily 4/4/24  Yes Hyacinth Garcia MD   famotidine (PEPCID) 40 MG tablet Take 1 tablet by mouth 2 times daily 4/4/24  Yes Hyacinth Garcia MD   aspirin 81 MG EC tablet Oral 7/30/15  Yes Provider, MD Magdiel   triamterene-hydroCHLOROthiazide (MAXZIDE-25) 37.5-25 MG per

## 2024-08-23 NOTE — CARE COORDINATION
[facility] Liaison spoke with Yana erin daughter reviewed demo info and noted she  lives w/pt. Her number is [phone]. Also aware of discharge & will initiate [facility].

## 2024-08-23 NOTE — PLAN OF CARE
Problem: Discharge Planning  Goal: Discharge to home or other facility with appropriate resources  Outcome: Progressing  Flowsheets  Taken 8/23/2024 1100  Discharge to home or other facility with appropriate resources: Identify barriers to discharge with patient and caregiver  Taken 8/23/2024 0800  Discharge to home or other facility with appropriate resources: Identify barriers to discharge with patient and caregiver     Problem: ABCDS Injury Assessment  Goal: Absence of physical injury  Outcome: Progressing     Problem: Safety - Adult  Goal: Free from fall injury  Outcome: Progressing     Problem: Pain  Goal: Verbalizes/displays adequate comfort level or baseline comfort level  Outcome: Progressing  Flowsheets  Taken 8/23/2024 1100  Verbalizes/displays adequate comfort level or baseline comfort level: Assess pain using appropriate pain scale  Taken 8/23/2024 0800  Verbalizes/displays adequate comfort level or baseline comfort level: Assess pain using appropriate pain scale     Problem: Skin/Tissue Integrity  Goal: Absence of new skin breakdown  Description: 1.  Monitor for areas of redness and/or skin breakdown  2.  Assess vascular access sites hourly  3.  Every 4-6 hours minimum:  Change oxygen saturation probe site  4.  Every 4-6 hours:  If on nasal continuous positive airway pressure, respiratory therapy assess nares and determine need for appliance change or resting period.  Outcome: Progressing     Problem: Respiratory - Adult  Goal: Achieves optimal ventilation and oxygenation  Outcome: Progressing  Flowsheets (Taken 8/23/2024 0800)  Achieves optimal ventilation and oxygenation: Assess for changes in respiratory status     Problem: Cardiovascular - Adult  Goal: Maintains optimal cardiac output and hemodynamic stability  Outcome: Progressing  Flowsheets  Taken 8/23/2024 1100  Maintains optimal cardiac output and hemodynamic stability: Monitor blood pressure and heart rate  Taken 8/23/2024 0800  Maintains

## 2024-08-23 NOTE — PROGRESS NOTES
1121: received telephone order with readback to remove epicardial ventricular pacing wires per RAFAEL Max.

## 2024-08-23 NOTE — PROGRESS NOTES
Cardiology Progress Note     Admit Date:  8/14/2024    Consult reason/ Seen today for :       Subjective and  Overnight Events : Plan for discharge today heart rate still in 100  Post CABG on 8/16/2024 with LIMA to LAD SVG to OM and SVG to PDA  Still quite swollen complaining of leg swelling but was able to walk    Chief complain on admission : 69 y.o.year old who is admitted forNo chief complaint on file.     Assessment / Plan:  ASCVD: Post CABG continue supportive care as per primary ct surgery started on Plavix as per CT surgery  He is short of breath hypoxic consider starting diuresis   Increase toprol Xl 150 mg daily , change to po amiodarone 200 mg    Echo showed preserved EF mildly enlarged left atrium right ventricle was dilated monitor closely  HTN: stable, continue To titrate up medication as needed  DVT Prophylaxis if no contraindication  Discussed with primary team, hospitalist service, bedside nursing staff and family  Past medical history:    has a past medical history of Hypertension.  Past surgical history:   has a past surgical history that includes Vasectomy; Total knee arthroplasty (Right); Tympanostomy tube placement (Left); Umbilical hernia repair; sinus surgery; Cardiac procedure (N/A, 8/14/2024); and Coronary artery bypass graft (N/A, 8/16/2024).  Social History:   reports that he has never smoked. He has never used smokeless tobacco. He reports that he does not drink alcohol and does not use drugs.  Family history:  family history includes Breast Cancer in his mother; Heart Failure in his father; Kidney Disease in his daughter; No Known Problems in his sister and sister.    No Known Allergies    Review of Systems:    All 14 systems were reviewed and are negative  Except for the positive findings  which as documented     /70   Pulse 96   Temp 98.2 °F (36.8 °C) (Oral)   Resp 17   Ht 1.778 m (5' 10\")   Wt

## 2024-08-24 ENCOUNTER — TELEPHONE (OUTPATIENT)
Dept: CARDIOVASCULAR ICU | Age: 69
End: 2024-08-24

## 2024-08-24 ENCOUNTER — TELEPHONE (OUTPATIENT)
Dept: CARDIOTHORACIC SURGERY | Age: 69
End: 2024-08-24

## 2024-08-24 DIAGNOSIS — R60.0 BILATERAL LEG EDEMA: Primary | ICD-10-CM

## 2024-08-24 NOTE — PROGRESS NOTES
1616: Karen Bradley, Home Health Nurse called at this time to report +1 pitting edema in in LLE; EVH site has a copious amount of serosanguinous drainage. This RN notified RAFAEL Solis.    1620: received telephone order with readback to have patient come into office Monday, 08/26/2024 at 1000 per RAFAEL Solis. New order to follow for vascular duplex lower extremity venous bilateral.    1628: this RN called Karen Bradley Home Health Nurse. This RN instructed nurse and pt to continue to gently cleanse MSI, previous chest tube sites, LLE EVH site, and burn on LLQ of abdomen with antibacterial soap, pat dry, and apply bacitracin to sites; gauze and tape may be applied to previous chest tube sites and EVH site. This RN informed nurse of ultrasound order and a scheduled appointment for Monday, 08/26/2024 at 1000. All concerns addressed at this time.

## 2024-08-24 NOTE — PROGRESS NOTES
1640: pt called inquiring where to go for ultrasound testing.     1645: received telephone order with readback for pt to have ultrasound order completed at [facility] tomorrow, 08/25/2024 per RAFAEL Solis.    1700: this RN attempted to call pt at this time.     1715: vascular duplex lower extremity venous bilateral order to be modified to STAT per ARFAEL Solis.     1730: this RN spoke with pt's alternative contact in chart (pt's sister), Jenna Tripathi at this time; this RN emphasized the importance of having ultrasound completed tomorrow, 08/25/2024 at [facility], so the impression will be accessible for the follow-up visit on Monday, 08/26/2024. Pt instructed to call CVICU Charge RN tomorrow upon arrival to notify ultrasound staff d/t lack of  in lobby during the weekend. All questions and concerns addressed at this time.

## 2024-08-25 ENCOUNTER — HOSPITAL ENCOUNTER (OUTPATIENT)
Dept: ULTRASOUND IMAGING | Age: 69
Discharge: HOME | End: 2024-08-25
Payer: COMMERCIAL

## 2024-08-25 ENCOUNTER — HOSPITAL ENCOUNTER (OUTPATIENT)
Age: 69
Discharge: HOME | End: 2024-08-25
Payer: COMMERCIAL

## 2024-08-25 DIAGNOSIS — R60.0 BILATERAL LEG EDEMA: ICD-10-CM

## 2024-08-25 PROCEDURE — 93970 EXTREMITY STUDY: CPT

## 2024-08-26 ENCOUNTER — OFFICE VISIT (OUTPATIENT)
Dept: CARDIOTHORACIC SURGERY | Age: 69
End: 2024-08-26

## 2024-08-26 ENCOUNTER — TELEPHONE (OUTPATIENT)
Dept: FAMILY MEDICINE CLINIC | Age: 69
End: 2024-08-26

## 2024-08-26 VITALS
SYSTOLIC BLOOD PRESSURE: 144 MMHG | HEIGHT: 70 IN | DIASTOLIC BLOOD PRESSURE: 72 MMHG | OXYGEN SATURATION: 95 % | BODY MASS INDEX: 42.25 KG/M2 | WEIGHT: 295.13 LBS | HEART RATE: 100 BPM

## 2024-08-26 DIAGNOSIS — Z09 POSTOPERATIVE FOLLOW-UP: ICD-10-CM

## 2024-08-26 DIAGNOSIS — R60.0 BILATERAL LEG EDEMA: Primary | ICD-10-CM

## 2024-08-26 PROCEDURE — 99024 POSTOP FOLLOW-UP VISIT: CPT | Performed by: PHYSICIAN ASSISTANT

## 2024-08-26 NOTE — TELEPHONE ENCOUNTER
Is there anyway we can change his f/u the the same day but in the afternoon?  He sees his cardiothoracic surgeon the same day.  I would prefer to see him after the surgeon so that I'm not having to address surgical problems.

## 2024-08-26 NOTE — TELEPHONE ENCOUNTER
Care Transitions Initial Follow Up Call    Outreach made within 2 business days of discharge: Yes    Patient: Mark Bourgeois Patient : 1955   MRN: [id number]  Reason for Admission: Abnormal Nuclear Stress Test   Discharge Date: 24       Spoke with: YESICA for Mark to return call     Discharge department/facility: [facility]     TCM Interactive Patient Contact:  Was patient able to fill all prescriptions:   Was patient instructed to bring all medications to the follow-up visit:   Is patient taking all medications as directed in the discharge summary?   Does patient understand their discharge instructions:   Does patient have questions or concerns that need addressed prior to 7-14 day follow up office visit:     Additional needs identified to be addressed with provider               Scheduled appointment with PCP within 7-14 days    Follow Up  Future Appointments   Date Time Provider Department Center   2024 12:00 PM Eddie Zavala MD [facility] SURG [facility]   10/3/2024 10:15 AM Hyacinth Garcia MD [facility]   2024 11:15 AM Derek Hernández MD [facility] Heart [facility]       Aicha Hampton MA

## 2024-08-26 NOTE — TELEPHONE ENCOUNTER
Care Transitions Initial Follow Up Call    Outreach made within 2 business days of discharge: Yes    Patient: Mark Bourgeois Patient : 1955   MRN: [id number]  Reason for Admission: Abnormal Stress Test   Discharge Date: 24       Spoke with: Mark Barnes department/facility: [facility] Interactive Patient Contact:  Was patient able to fill all prescriptions: Yes  Was patient instructed to bring all medications to the follow-up visit: Yes  Is patient taking all medications as directed in the discharge summary? Yes  Does patient understand their discharge instructions: Yes  Does patient have questions or concerns that need addressed prior to 7-14 day follow up office visit: no    Additional needs identified to be addressed with provider  No needs identified             Scheduled appointment with PCP within 7-14 days    Follow Up  Future Appointments   Date Time Provider Department Center   2024 10:15 AM Hyacinth Garcia MD [facility] DEP   2024 12:00 PM Eddie Zavala MD [facility] SURG [facility]   10/3/2024 10:15 AM Hyacinth Garcia MD [facility] DEP   2024 11:15 AM Derek Hernández MD [facility] Heart [facility]       Aicha Hampton MA

## 2024-08-26 NOTE — PROGRESS NOTES
Cardiothoracic Surgery   Postoperative Follow Up    Cardiologist:  Dr Scott    Procedure:  CABGx3 (LIMA-LAD, SVG-OM1, SVG-PDA) on 8/16/2024.       HISTORY OF PRESENT ILLNESS       Mark Bourgeois is a 69 y.o. male who presents today for a postoperative follow up appointment.  Patient was recently discharged home on 8/24/24 with Doxy and Keflex for possible LLE cellulitis at EV site. He called over the weekend due to increasing bloody drainage from the incision as well as increased LLE edema. BLE venous duplex completed yesterday. Results below. Patient does have [facility] nurse who has been changing his dressings. Denies having any fever or chills or pustular drainage from incision. Currently taking Lasix 40mg PO BID. Weight is down 5+ lbs since DC    OBJECTIVE   VITALS:  BP (!) 144/72 (Site: Left Upper Arm, Position: Sitting, Cuff Size: Large Adult)   Pulse 100   Ht 1.778 m (5' 10\")   Wt 133.9 kg (295 lb 2 oz)   SpO2 95%   BMI 42.35 kg/m²      Physical Exam:  Gen: alert, well appearing, and in no distress, oriented to person, place, and time, and normal appearing weight  Chest:  Sternotomy site well approximated, clean and dry.  No signs of erythema, swelling, or drainage. Chest tube insertion sites clean and dry.  No signs of infection.  Lung: clear to auscultation, no wheezes or rales, and unlabored breathing  Heart: S1 and S2 normal, no murmur, click, gallop or rub  Extremities: trace-1+ BLE edema. LLE EVH site is well approximated. Small amount of dark bloody drainage noted from incision. No erythema or warmth noted. Mild TTP. No sign of infection. Appears to be a hematoma which is consistent with US    BLE venous duplex    FINDINGS: The major venous vascular structures of the lower extremities were  evaluated with ultrasound grayscale and venous duplex interrogation. Technically  limited examination due to patient body habitus and swelling.     The major venous vascular structures of the right lower extremity  appear patent,  without echogenic intraluminal thrombus. There is appropriate response to  technologist augmentation and compression.     In the left lower extremity, there is lack of compressibility of the greater  saphenous vein the remaining major venous vascular structures of the left lower  extremity are grossly patent.     Near the medial left calf incision, there is a 3.0 x 1.8 cm loculated hypoechoic  area.     IMPRESSION:  1. The major venous vascular structures of the right lower extremity are patent.     2. There is lack of compressibility of the left greater saphenous vein,  suggestive of thrombosis. The remaining major venous vascular structures of the  left lower extremity appear patent.     3. 3.0 x 1.8 lobulated hypoechoic area near the medial incision left calf area,  which could be due to a possible postoperative seroma or hematoma. Suggest close  clinical follow-up.       Assessment:     S/p CABGx3 (LIMA-LAD, SVG-OM1, SVG-PDA) on 8/16/2024.         Plan     Continue Lasix 40mg PO BID as ordered.  Continue antibiotics  Keep original post op appointment scheduled for this Thursday 8/29/24  Change dressings PRN

## 2024-08-28 ENCOUNTER — HOSPITAL ENCOUNTER (OUTPATIENT)
Dept: GENERAL RADIOLOGY | Age: 69
Discharge: HOME | End: 2024-08-28
Payer: COMMERCIAL

## 2024-08-28 ENCOUNTER — HOSPITAL ENCOUNTER (OUTPATIENT)
Age: 69
Discharge: HOME | End: 2024-08-28
Payer: COMMERCIAL

## 2024-08-28 DIAGNOSIS — I25.10 ASCVD (ARTERIOSCLEROTIC CARDIOVASCULAR DISEASE): ICD-10-CM

## 2024-08-28 LAB
ALBUMIN SERPL-MCNC: 3.7 GM/DL (ref 3.4–5)
ALP BLD-CCNC: 124 IU/L (ref 40–129)
ALT SERPL-CCNC: 31 U/L (ref 10–40)
ANION GAP SERPL CALCULATED.3IONS-SCNC: 12 MMOL/L (ref 7–16)
AST SERPL-CCNC: 22 IU/L (ref 15–37)
BASOPHILS ABSOLUTE: 0 K/CU MM
BASOPHILS RELATIVE PERCENT: 0.3 % (ref 0–1)
BILIRUB SERPL-MCNC: 0.5 MG/DL (ref 0–1)
BUN SERPL-MCNC: 26 MG/DL (ref 6–23)
CALCIUM SERPL-MCNC: 9.2 MG/DL (ref 8.3–10.6)
CHLORIDE BLD-SCNC: 104 MMOL/L (ref 99–110)
CO2: 26 MMOL/L (ref 21–32)
CREAT SERPL-MCNC: 1.4 MG/DL (ref 0.9–1.3)
DIFFERENTIAL TYPE: ABNORMAL
EOSINOPHILS ABSOLUTE: 0.2 K/CU MM
EOSINOPHILS RELATIVE PERCENT: 2.3 % (ref 0–3)
GFR, ESTIMATED: 54 ML/MIN/1.73M2
GLUCOSE SERPL-MCNC: 103 MG/DL (ref 70–99)
HCT VFR BLD CALC: 41.6 % (ref 42–52)
HEMOGLOBIN: 13 GM/DL (ref 13.5–18)
IMMATURE NEUTROPHIL %: 0.8 % (ref 0–0.43)
LYMPHOCYTES ABSOLUTE: 1.7 K/CU MM
LYMPHOCYTES RELATIVE PERCENT: 20.3 % (ref 24–44)
MCH RBC QN AUTO: 28.2 PG (ref 27–31)
MCHC RBC AUTO-ENTMCNC: 31.3 % (ref 32–36)
MCV RBC AUTO: 90.2 FL (ref 78–100)
MONOCYTES ABSOLUTE: 0.7 K/CU MM
MONOCYTES RELATIVE PERCENT: 7.9 % (ref 0–4)
NEUTROPHILS ABSOLUTE: 5.9 K/CU MM
NEUTROPHILS RELATIVE PERCENT: 68.4 % (ref 36–66)
NUCLEATED RBC %: 0 %
PDW BLD-RTO: 14.1 % (ref 11.7–14.9)
PLATELET # BLD: 467 K/CU MM (ref 140–440)
PMV BLD AUTO: 9.5 FL (ref 7.5–11.1)
POTASSIUM SERPL-SCNC: 4.5 MMOL/L (ref 3.5–5.1)
RBC # BLD: 4.61 M/CU MM (ref 4.6–6.2)
SODIUM BLD-SCNC: 142 MMOL/L (ref 135–145)
TOTAL IMMATURE NEUTOROPHIL: 0.07 K/CU MM
TOTAL NUCLEATED RBC: 0 K/CU MM
TOTAL PROTEIN: 6.7 GM/DL (ref 6.4–8.2)
WBC # BLD: 8.6 K/CU MM (ref 4–10.5)

## 2024-08-28 PROCEDURE — 36415 COLL VENOUS BLD VENIPUNCTURE: CPT

## 2024-08-28 PROCEDURE — 71046 X-RAY EXAM CHEST 2 VIEWS: CPT

## 2024-08-28 PROCEDURE — 85025 COMPLETE CBC W/AUTO DIFF WBC: CPT

## 2024-08-28 PROCEDURE — 80053 COMPREHEN METABOLIC PANEL: CPT

## 2024-08-28 NOTE — PROGRESS NOTES
Cardiothoracic Surgery   Postoperative Follow Up       Cardiologist:  Dr Scott     Procedure:  CABGx3 (LIMA-LAD, SVG-OM1, SVG-PDA) on 8/16/2024.       HISTORY OF PRESENT ILLNESS       Mark Bourgeois is a 69 y.o. male who presents today for a postoperative follow up appointment. Pt is doing well, continues to improve, increasing activity level as tolerated - no SOB or CP noted. Leg incision is healing, minimal bloody drainage from EVH site. small hematoma remains. He is still wearing compression stockings daily and taking Lasix as prescribed    OBJECTIVE   VITALS:  /66 (Site: Left Upper Arm, Position: Sitting, Cuff Size: Large Adult)   Pulse 87   Ht 1.778 m (5' 10\")   Wt 131.7 kg (290 lb 4 oz)   SpO2 95%   BMI 41.65 kg/m²      Physical Exam:  Gen: alert, well appearing, and in no distress, oriented to person, place, and time, and normal appearing weight  Chest:  Sternotomy site well approximated, clean and dry.  No signs of erythema, swelling, or drainage. Chest tube insertion sites clean and dry.  No signs of infection.  Lung: clear to auscultation, no wheezes or rales, and unlabored breathing  Heart: S1 and S2 normal, no murmur, click, gallop or rub  Extremities: peripheral pulses normal, trace-1+ BLE edema but improving. LLE EVH site is well approximated. trace bloody drainage noted from incision. No erythema or warmth noted. Mild TTP. No sign of infection. Hematoma improving    Radiological and other results:  CXR: no pneumothorax or effusion        Assessment:     S/p CABGx3 (LIMA-LAD, SVG-OM1, SVG-PDA) on 8/16/2024.         Plan     Follow up with Cardiology in September  RTO in 1 month  Call PRN if any issues with incisions

## 2024-08-29 ENCOUNTER — OFFICE VISIT (OUTPATIENT)
Dept: CARDIOTHORACIC SURGERY | Age: 69
End: 2024-08-29

## 2024-08-29 VITALS
OXYGEN SATURATION: 95 % | HEART RATE: 87 BPM | WEIGHT: 290.25 LBS | DIASTOLIC BLOOD PRESSURE: 66 MMHG | SYSTOLIC BLOOD PRESSURE: 118 MMHG | HEIGHT: 70 IN | BODY MASS INDEX: 41.55 KG/M2

## 2024-08-29 DIAGNOSIS — Z09 POSTOPERATIVE FOLLOW-UP: Primary | ICD-10-CM

## 2024-08-29 PROCEDURE — 99024 POSTOP FOLLOW-UP VISIT: CPT | Performed by: THORACIC SURGERY (CARDIOTHORACIC VASCULAR SURGERY)

## 2024-09-04 ENCOUNTER — OFFICE VISIT (OUTPATIENT)
Dept: FAMILY MEDICINE CLINIC | Age: 69
End: 2024-09-04

## 2024-09-04 VITALS
TEMPERATURE: 98.1 F | RESPIRATION RATE: 16 BRPM | OXYGEN SATURATION: 95 % | BODY MASS INDEX: 41.75 KG/M2 | DIASTOLIC BLOOD PRESSURE: 70 MMHG | HEART RATE: 91 BPM | WEIGHT: 291 LBS | SYSTOLIC BLOOD PRESSURE: 120 MMHG

## 2024-09-04 DIAGNOSIS — M25.561 CHRONIC KNEE PAIN AFTER TOTAL REPLACEMENT OF RIGHT KNEE JOINT: ICD-10-CM

## 2024-09-04 DIAGNOSIS — Z29.11 NEED FOR PROPHYLACTIC VACCINATION AND INOCULATION AGAINST RESPIRATORY SYNCYTIAL VIRUS (RSV): ICD-10-CM

## 2024-09-04 DIAGNOSIS — Z23 NEED FOR INFLUENZA VACCINATION: ICD-10-CM

## 2024-09-04 DIAGNOSIS — K21.9 GASTROESOPHAGEAL REFLUX DISEASE, UNSPECIFIED WHETHER ESOPHAGITIS PRESENT: ICD-10-CM

## 2024-09-04 DIAGNOSIS — Z23 NEEDS FLU SHOT: ICD-10-CM

## 2024-09-04 DIAGNOSIS — Z95.1 S/P CABG (CORONARY ARTERY BYPASS GRAFT): ICD-10-CM

## 2024-09-04 DIAGNOSIS — Z96.651 CHRONIC KNEE PAIN AFTER TOTAL REPLACEMENT OF RIGHT KNEE JOINT: ICD-10-CM

## 2024-09-04 DIAGNOSIS — I10 PRIMARY HYPERTENSION: ICD-10-CM

## 2024-09-04 DIAGNOSIS — Z23 NEED FOR SHINGLES VACCINE: ICD-10-CM

## 2024-09-04 DIAGNOSIS — G89.29 CHRONIC KNEE PAIN AFTER TOTAL REPLACEMENT OF RIGHT KNEE JOINT: ICD-10-CM

## 2024-09-04 DIAGNOSIS — K59.00 CONSTIPATION, UNSPECIFIED CONSTIPATION TYPE: ICD-10-CM

## 2024-09-04 DIAGNOSIS — Z09 HOSPITAL DISCHARGE FOLLOW-UP: Primary | ICD-10-CM

## 2024-09-04 DIAGNOSIS — Z23 NEED FOR COVID-19 VACCINE: ICD-10-CM

## 2024-09-04 DIAGNOSIS — R60.0 BILATERAL LEG EDEMA: ICD-10-CM

## 2024-09-04 DIAGNOSIS — Z79.899 ON POTASSIUM SPARING DIURETIC THERAPY: ICD-10-CM

## 2024-09-04 RX ORDER — POTASSIUM CHLORIDE 1500 MG/1
20 TABLET, EXTENDED RELEASE ORAL 2 TIMES DAILY
Qty: 180 TABLET | Refills: 3 | Status: SHIPPED | OUTPATIENT
Start: 2024-09-04

## 2024-09-04 RX ORDER — POTASSIUM CHLORIDE 1500 MG/1
20 TABLET, EXTENDED RELEASE ORAL 2 TIMES DAILY
Qty: 30 TABLET | Refills: 0 | OUTPATIENT
Start: 2024-09-04

## 2024-09-04 RX ORDER — FUROSEMIDE 40 MG
40 TABLET ORAL DAILY
Qty: 90 TABLET | Refills: 3 | Status: SHIPPED | OUTPATIENT
Start: 2024-09-04

## 2024-09-04 RX ORDER — FAMOTIDINE 40 MG/1
40 TABLET, FILM COATED ORAL 2 TIMES DAILY
Qty: 180 TABLET | Refills: 3 | Status: SHIPPED | OUTPATIENT
Start: 2024-09-04

## 2024-09-04 RX ORDER — METOPROLOL SUCCINATE 100 MG/1
150 TABLET, EXTENDED RELEASE ORAL DAILY
Qty: 135 TABLET | Refills: 0 | Status: SHIPPED | OUTPATIENT
Start: 2024-09-04 | End: 2024-12-03

## 2024-09-04 RX ORDER — TRAMADOL HYDROCHLORIDE 50 MG/1
50 TABLET ORAL EVERY 6 HOURS PRN
Qty: 10 TABLET | Refills: 0 | Status: SHIPPED | OUTPATIENT
Start: 2024-09-04 | End: 2024-09-14

## 2024-09-04 RX ORDER — LACTOBACILLUS RHAMNOSUS GG 10B CELL
1 CAPSULE ORAL
Qty: 90 CAPSULE | Refills: 3 | Status: SHIPPED | OUTPATIENT
Start: 2024-09-04 | End: 2025-08-30

## 2024-09-04 SDOH — ECONOMIC STABILITY: FOOD INSECURITY: WITHIN THE PAST 12 MONTHS, THE FOOD YOU BOUGHT JUST DIDN'T LAST AND YOU DIDN'T HAVE MONEY TO GET MORE.: NEVER TRUE

## 2024-09-04 SDOH — ECONOMIC STABILITY: INCOME INSECURITY: HOW HARD IS IT FOR YOU TO PAY FOR THE VERY BASICS LIKE FOOD, HOUSING, MEDICAL CARE, AND HEATING?: NOT VERY HARD

## 2024-09-04 SDOH — ECONOMIC STABILITY: FOOD INSECURITY: WITHIN THE PAST 12 MONTHS, YOU WORRIED THAT YOUR FOOD WOULD RUN OUT BEFORE YOU GOT MONEY TO BUY MORE.: NEVER TRUE

## 2024-09-04 NOTE — PROGRESS NOTES
Post-Discharge Transitional Care  Follow Up      Mark Bourgeois   YOB: 1955    Date of Office Visit:  9/4/2024  Date of Hospital Admission: 8/14/24  Date of Hospital Discharge: 8/23/24  Risk of hospital readmission (high >=14%. Medium >=10%) :Readmission Risk Score: 8.9      Care management risk score Rising risk (score 2-5) and Complex Care (Scores >=6): No Risk Score On File     Non face to face  following discharge, date last encounter closed (first attempt may have been earlier): 08/26/2024    Call initiated 2 business days of discharge: Yes    ASSESSMENT/PLAN:   Hospital discharge follow-up  -     OR DISCHARGE MEDS RECONCILED W/ CURRENT OUTPATIENT MED LIST  Need for prophylactic vaccination and inoculation against respiratory syncytial virus (RSV)  Need for shingles vaccine  Needs flu shot  Need for COVID-19 vaccine  Need for influenza vaccination  -     Influenza, FLUAD Trivalent, (age 65 y+), IM, Preservative Free, 0.5mL  On potassium sparing diuretic therapy  -     potassium chloride (KLOR-CON M) 20 MEQ extended release tablet; Take 1 tablet by mouth 2 times daily, Disp-180 tablet, R-3Normal  Bilateral leg edema  -     furosemide (LASIX) 40 MG tablet; Take 1 tablet by mouth daily, Disp-90 tablet, R-3Normal  Constipation, unspecified constipation type  Primary hypertension  -     metoprolol succinate (TOPROL XL) 100 MG extended release tablet; Take 1.5 tablets by mouth daily for 90 doses, Disp-135 tablet, R-0Normal  Gastroesophageal reflux disease, unspecified whether esophagitis present  -     famotidine (PEPCID) 40 MG tablet; Take 1 tablet by mouth 2 times daily, Disp-180 tablet, R-3Normal  S/P CABG (coronary artery bypass graft)  Chronic knee pain after total replacement of right knee joint  -     traMADol (ULTRAM) 50 MG tablet; Take 1 tablet by mouth every 6 hours as needed for Pain for up to 10 days. Max Daily Amount: 200 mg, Disp-10 tablet, R-0Normal      Medical Decision Making:

## 2024-09-10 DIAGNOSIS — R60.0 BILATERAL LEG EDEMA: ICD-10-CM

## 2024-09-10 RX ORDER — FUROSEMIDE 40 MG/1
40 TABLET ORAL DAILY
Qty: 90 TABLET | Refills: 1 | OUTPATIENT
Start: 2024-09-10

## 2024-09-10 RX ORDER — AMIODARONE HYDROCHLORIDE 200 MG/1
200 TABLET ORAL DAILY
Qty: 90 TABLET | Refills: 1 | OUTPATIENT
Start: 2024-09-10

## 2024-09-10 NOTE — TELEPHONE ENCOUNTER
Amiodarone completed once current bottle finished.   Dana just sent in 90 days lasix 6 days ago.   If not having increased swelling or weight gain will continue current lasix.

## 2024-09-10 NOTE — TELEPHONE ENCOUNTER
90 day patient called not sure if we can take these over   Or if he needs to call Dr Wright please advise   Appt 9/24

## 2024-09-12 ENCOUNTER — TELEPHONE (OUTPATIENT)
Dept: CARDIOLOGY CLINIC | Age: 69
End: 2024-09-12

## 2024-09-16 RX ORDER — GABAPENTIN 300 MG/1
300 CAPSULE ORAL 3 TIMES DAILY
Qty: 90 CAPSULE | Refills: 0 | Status: CANCELLED | OUTPATIENT
Start: 2024-09-16 | End: 2024-10-16

## 2024-09-16 RX ORDER — AMIODARONE HYDROCHLORIDE 200 MG/1
200 TABLET ORAL DAILY
Qty: 30 TABLET | Refills: 0 | Status: CANCELLED | OUTPATIENT
Start: 2024-09-16 | End: 2024-10-16

## 2024-09-18 RX ORDER — GABAPENTIN 300 MG/1
300 CAPSULE ORAL 3 TIMES DAILY
Qty: 90 CAPSULE | Refills: 0 | OUTPATIENT
Start: 2024-09-18 | End: 2024-10-18

## 2024-09-18 RX ORDER — AMIODARONE HYDROCHLORIDE 200 MG/1
200 TABLET ORAL DAILY
Qty: 30 TABLET | Refills: 0 | OUTPATIENT
Start: 2024-09-18 | End: 2024-10-18

## 2024-09-20 RX ORDER — FUROSEMIDE 40 MG/1
TABLET ORAL
Qty: 30 TABLET | Refills: 0 | OUTPATIENT
Start: 2024-09-20

## 2024-09-20 RX ORDER — AMIODARONE HYDROCHLORIDE 200 MG/1
200 TABLET ORAL DAILY
Qty: 30 TABLET | Refills: 0 | OUTPATIENT
Start: 2024-09-20 | End: 2024-10-20

## 2024-09-25 PROBLEM — Z09 POSTOPERATIVE FOLLOW-UP: Status: RESOLVED | Noted: 2024-08-26 | Resolved: 2024-09-25

## 2024-09-26 ENCOUNTER — TELEPHONE (OUTPATIENT)
Dept: CARDIOLOGY CLINIC | Age: 69
End: 2024-09-26

## 2024-09-27 NOTE — PROGRESS NOTES
Cardiothoracic Surgery   Postoperative Follow Up    Cardiologist:  Dr Scott     Procedure:  CABGx3 (LIMA-LAD, SVG-OM1, SVG-PDA) on 8/16/2024.       HISTORY OF PRESENT ILLNESS       Mark Bourgeois is a 69 y.o. male who presents today for a postoperative follow up appointment.     Pt is doing well, continues to improve, increasing activity level as tolerated - small area on EVH incision had a blister yesterday which has popped. Serosang drainage noted. No fevers or chills.    OBJECTIVE   VITALS:  /66 (Site: Left Upper Arm, Position: Sitting, Cuff Size: Large Adult)   Pulse 81   Ht 1.803 m (5' 10.98\")   Wt 130.3 kg (287 lb 4 oz)   SpO2 95%   BMI 40.08 kg/m²      Physical Exam:  Gen: alert, well appearing, and in no distress, oriented to person, place, and time, and normal appearing weight  Chest:  Sternotomy site well approximated, clean and dry.  No signs of erythema, swelling, or drainage. Chest tube insertion sites clean and dry.  No signs of infection.  Lung: clear to auscultation, no wheezes or rales, and unlabored breathing  Heart: S1 and S2 normal, no murmur, click, gallop or rub  Extremities: peripheral pulses normal, no pedal edema, no clubbing or cyanosis EVH site slightly dehisced with small amount of bloody drainage noted          Assessment:     S/p CABGx3 (LIMA-LAD, SVG-OM1, SVG-PDA) on 8/16/2024.         Plan     Refer to wound clinic  RTO 1 month for wound check

## 2024-09-30 ENCOUNTER — OFFICE VISIT (OUTPATIENT)
Dept: CARDIOLOGY CLINIC | Age: 69
End: 2024-09-30
Payer: COMMERCIAL

## 2024-09-30 VITALS
DIASTOLIC BLOOD PRESSURE: 72 MMHG | HEIGHT: 71 IN | WEIGHT: 286 LBS | BODY MASS INDEX: 40.04 KG/M2 | SYSTOLIC BLOOD PRESSURE: 122 MMHG | OXYGEN SATURATION: 97 % | HEART RATE: 77 BPM

## 2024-09-30 DIAGNOSIS — I10 PRIMARY HYPERTENSION: ICD-10-CM

## 2024-09-30 DIAGNOSIS — E78.2 MIXED HYPERLIPIDEMIA: ICD-10-CM

## 2024-09-30 DIAGNOSIS — E66.01 OBESITY, CLASS III, BMI 40-49.9 (MORBID OBESITY): ICD-10-CM

## 2024-09-30 DIAGNOSIS — R06.02 SOB (SHORTNESS OF BREATH) ON EXERTION: ICD-10-CM

## 2024-09-30 DIAGNOSIS — R00.2 PALPITATIONS: ICD-10-CM

## 2024-09-30 DIAGNOSIS — E78.5 HYPERLIPIDEMIA, UNSPECIFIED HYPERLIPIDEMIA TYPE: ICD-10-CM

## 2024-09-30 DIAGNOSIS — Z79.899 ON POTASSIUM SPARING DIURETIC THERAPY: ICD-10-CM

## 2024-09-30 DIAGNOSIS — I25.10 ASCVD (ARTERIOSCLEROTIC CARDIOVASCULAR DISEASE): Primary | ICD-10-CM

## 2024-09-30 DIAGNOSIS — Z82.49 FAMILY HISTORY OF EARLY CAD: ICD-10-CM

## 2024-09-30 DIAGNOSIS — R60.0 BILATERAL LEG EDEMA: ICD-10-CM

## 2024-09-30 DIAGNOSIS — I49.3 PVC (PREMATURE VENTRICULAR CONTRACTION): ICD-10-CM

## 2024-09-30 PROCEDURE — 3074F SYST BP LT 130 MM HG: CPT | Performed by: INTERNAL MEDICINE

## 2024-09-30 PROCEDURE — 3078F DIAST BP <80 MM HG: CPT | Performed by: INTERNAL MEDICINE

## 2024-09-30 PROCEDURE — 99214 OFFICE O/P EST MOD 30 MIN: CPT | Performed by: INTERNAL MEDICINE

## 2024-09-30 PROCEDURE — 1123F ACP DISCUSS/DSCN MKR DOCD: CPT | Performed by: INTERNAL MEDICINE

## 2024-09-30 RX ORDER — CLOPIDOGREL BISULFATE 75 MG/1
75 TABLET ORAL DAILY
Qty: 30 TABLET | Refills: 3 | Status: SHIPPED | OUTPATIENT
Start: 2024-09-30

## 2024-09-30 RX ORDER — FUROSEMIDE 40 MG
40 TABLET ORAL DAILY
Qty: 90 TABLET | Refills: 3 | Status: SHIPPED | OUTPATIENT
Start: 2024-09-30

## 2024-09-30 RX ORDER — ASPIRIN 81 MG/1
81 TABLET ORAL DAILY
Qty: 30 TABLET | Refills: 3 | Status: SHIPPED | OUTPATIENT
Start: 2024-09-30

## 2024-09-30 RX ORDER — ATORVASTATIN CALCIUM 40 MG/1
80 TABLET, FILM COATED ORAL DAILY
Qty: 90 TABLET | Refills: 3 | Status: SHIPPED | OUTPATIENT
Start: 2024-09-30 | End: 2024-10-03 | Stop reason: SDUPTHER

## 2024-09-30 RX ORDER — METOPROLOL SUCCINATE 100 MG/1
150 TABLET, EXTENDED RELEASE ORAL DAILY
Qty: 135 TABLET | Refills: 0 | Status: SHIPPED | OUTPATIENT
Start: 2024-09-30 | End: 2024-12-29

## 2024-09-30 RX ORDER — POTASSIUM CHLORIDE 1500 MG/1
20 TABLET, EXTENDED RELEASE ORAL 2 TIMES DAILY
Qty: 180 TABLET | Refills: 3 | Status: SHIPPED | OUTPATIENT
Start: 2024-09-30

## 2024-09-30 NOTE — PROGRESS NOTES
CARDIOLOGY  NOTE    Chief Complaint: SOB/PVC    HPI:   Mark is a 69 y.o. year old who has Past medical history as noted below.  Pavan had CABG in August 2024 due to multi vessel cad he says his breathign is gettign better but he still has an open wound onleft leg at the venous extraction site    On Holter he had runs of wide-complex tachycardia although he was not symptomatic on treadmill he could only complete 3 minutes of exercise in order to have multiple PVCs   He denies any chest pain as such but has exertional shortness of breath for quite some he has been hypertensive used to have a lot of palpitation which is somewhat better after titrating up of his metoprolol there is family history of coronary artery disease and heart failure father  had  heart problems.      Current Outpatient Medications   Medication Sig Dispense Refill    furosemide (LASIX) 40 MG tablet Take 1 tablet by mouth daily 90 tablet 3    clopidogrel (PLAVIX) 75 MG tablet Take 1 tablet by mouth daily 30 tablet 3    atorvastatin (LIPITOR) 40 MG tablet Take 2 tablets by mouth daily 90 tablet 3    metoprolol succinate (TOPROL XL) 100 MG extended release tablet Take 1.5 tablets by mouth daily for 90 doses 135 tablet 0    aspirin 81 MG EC tablet Take 1 tablet by mouth daily 30 tablet 3    potassium chloride (KLOR-CON M) 20 MEQ extended release tablet Take 1 tablet by mouth 2 times daily 180 tablet 3    famotidine (PEPCID) 40 MG tablet Take 1 tablet by mouth 2 times daily 180 tablet 3    acetaminophen (TYLENOL) 500 MG tablet Take 2 tablets by mouth every 6 hours as needed for Pain 120 tablet 3    lactobacillus (CULTURELLE) capsule Take 1 capsule by mouth daily (with breakfast) (Patient not taking: Reported on 9/30/2024) 90 capsule 3    amiodarone (CORDARONE) 200 MG tablet Take 1 tablet by mouth daily (Patient not taking: Reported on 9/30/2024) 30 tablet 0    gabapentin (NEURONTIN) 300 MG capsule Take 1 capsule

## 2024-10-01 ENCOUNTER — OFFICE VISIT (OUTPATIENT)
Dept: CARDIOTHORACIC SURGERY | Age: 69
End: 2024-10-01

## 2024-10-01 VITALS
OXYGEN SATURATION: 95 % | SYSTOLIC BLOOD PRESSURE: 112 MMHG | HEIGHT: 71 IN | DIASTOLIC BLOOD PRESSURE: 66 MMHG | HEART RATE: 81 BPM | WEIGHT: 287.25 LBS | BODY MASS INDEX: 40.21 KG/M2

## 2024-10-01 DIAGNOSIS — T81.89XA NON-HEALING SURGICAL WOUND, INITIAL ENCOUNTER: Primary | ICD-10-CM

## 2024-10-01 PROCEDURE — 99024 POSTOP FOLLOW-UP VISIT: CPT | Performed by: THORACIC SURGERY (CARDIOTHORACIC VASCULAR SURGERY)

## 2024-10-03 ENCOUNTER — HOSPITAL ENCOUNTER (OUTPATIENT)
Dept: WOUND CARE | Age: 69
Discharge: HOME | End: 2024-10-03
Attending: NURSE PRACTITIONER
Payer: COMMERCIAL

## 2024-10-03 ENCOUNTER — TELEPHONE (OUTPATIENT)
Dept: CARDIOLOGY CLINIC | Age: 69
End: 2024-10-03

## 2024-10-03 VITALS
DIASTOLIC BLOOD PRESSURE: 78 MMHG | TEMPERATURE: 98.1 F | RESPIRATION RATE: 18 BRPM | SYSTOLIC BLOOD PRESSURE: 156 MMHG | HEART RATE: 75 BPM

## 2024-10-03 DIAGNOSIS — E78.5 HYPERLIPIDEMIA, UNSPECIFIED HYPERLIPIDEMIA TYPE: ICD-10-CM

## 2024-10-03 DIAGNOSIS — E66.01 OBESITY, CLASS III, BMI 40-49.9 (MORBID OBESITY): ICD-10-CM

## 2024-10-03 DIAGNOSIS — L97.922 SKIN ULCER OF LEFT LOWER LEG WITH FAT LAYER EXPOSED (HCC): ICD-10-CM

## 2024-10-03 DIAGNOSIS — R73.03 PREDIABETES: ICD-10-CM

## 2024-10-03 DIAGNOSIS — T81.89XS NONHEALING SURGICAL WOUND, SEQUELA: Primary | ICD-10-CM

## 2024-10-03 PROCEDURE — 99213 OFFICE O/P EST LOW 20 MIN: CPT

## 2024-10-03 PROCEDURE — 11042 DBRDMT SUBQ TIS 1ST 20SQCM/<: CPT

## 2024-10-03 RX ORDER — ATORVASTATIN CALCIUM 80 MG/1
80 TABLET, FILM COATED ORAL DAILY
Qty: 90 TABLET | Refills: 3 | Status: SHIPPED | OUTPATIENT
Start: 2024-10-03

## 2024-10-03 NOTE — PATIENT INSTRUCTIONS
PHYSICIAN ORDERS AND DISCHARGE INSTRUCTIONS    Wound cleansing:     Do not scrub or use excessive force.   Wash hands with soap and water before and after dressing changes.   Prior to applying a clean dressing, cleanse wound with normal saline,               wound cleanser, or mild soap and water.    Ask the physician or nurse before getting the wound(s) wet in a shower      Wound Care Notes:  Cardiology: Dr Scott   S/P CABG          Orders for this week:  10/3/2024       Left Lower Leg :Wash with soap and water. Pat dry.   Apply betadine and stimulen powder   Bolster with steristrips  Cover with agile and tegaderm   Wrap with tubi F or G  Change: Leave in place for 1 week     Dispense 30 day quantity when ordering supplies.  Plan:       Nurse Visit: 1 week   Follow Up Instructions: At the Wound Care Center in 2 week   Primary Wound Care Provider: Carie Bowman CNP   Call  for any questions or concerns.  Central [phone] for imaging and lab work

## 2024-10-03 NOTE — TELEPHONE ENCOUNTER
Received PA request for Atorvastatin. RX was written for Atorvastatin 40 mg, take two tablets daily. The pharmacy advised that the patient was previously taking Atorvastatin 40 mg daily. If there has been an increase, insurance requests RX be written Atorvastatin 80 mg QD. Call to [facility] to verify that it is ok to change the RX to one 80 mg tablet. (Shivani AVALOS aware).

## 2024-10-03 NOTE — PROGRESS NOTES
Wound Care Center Initial Visit      Mark Bourgeois  AGE: 69 y.o.   GENDER: male  : 1955  EPISODE DATE:  10/3/2024   Referred by: Hyacinth Garcia MD     Subjective:     CHIEF COMPLAINT  WOUND   Problem List Items Addressed This Visit          Other    Obesity, Class III, BMI 40-49.9 (morbid obesity)    Prediabetes    Nonhealing surgical wound, sequela - Primary    Skin ulcer of left lower leg with fat layer exposed (HCC)     Chief Complaint   Patient presents with    Wound Check        HISTORY of PRESENT ILLNESS      Mark Bourgeois is a 69 y.o. 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 The condition is of moderate severity. The ulcer has been present since .  The underlying cause is thought to be nonhealing surgical post CABG 24.  The patients care to date has included evaluation per CV surgeon with referral to the wound clinic. The patient has significant underlying medical conditions as below. Hyacinth Garcia MD .    Living Situation  [x] Home [] SNF []  Assisted living  [] Other (ie rehab, etc.) [] Home Health  []  Transportation    Wound Pain Timing/Severity: intermittent, mild  Quality of pain: aching, tender  Severity of pain:  2 / 10   Modifying Factors: edema, venous stasis, obesity, and prediabetes  Associated Signs/Symptoms: edema, drainage, and pain    Wound status: on 10/3/2024     Regimen discussed and established with patient/family as below. The patients records were reviewed and discussed.  Time was given for questions. All questions were answered to the patients satisfaction.      [] Stable [] Improved [] Worse [x] Initial visit []  Revisit []  Healed    [x] Adjustments made to regimen of care  [] Off loading regimen  [] Compression regimen  [] Wound Vac Therapy  [] Increased frequency of dressing change  [] Revaluation of the wound in a shorter interval to assess

## 2024-10-10 ENCOUNTER — HOSPITAL ENCOUNTER (OUTPATIENT)
Dept: WOUND CARE | Age: 69
Discharge: HOME | End: 2024-10-10
Attending: NURSE PRACTITIONER
Payer: COMMERCIAL

## 2024-10-10 VITALS
TEMPERATURE: 97.8 F | SYSTOLIC BLOOD PRESSURE: 145 MMHG | DIASTOLIC BLOOD PRESSURE: 86 MMHG | RESPIRATION RATE: 18 BRPM | HEART RATE: 90 BPM

## 2024-10-10 DIAGNOSIS — L97.922 SKIN ULCER OF LEFT LOWER LEG WITH FAT LAYER EXPOSED (HCC): Primary | ICD-10-CM

## 2024-10-10 PROCEDURE — 99212 OFFICE O/P EST SF 10 MIN: CPT

## 2024-10-10 PROCEDURE — 99213 OFFICE O/P EST LOW 20 MIN: CPT | Performed by: SURGERY

## 2024-10-10 NOTE — PROGRESS NOTES
Instructions   PHYSICIAN ORDERS AND DISCHARGE INSTRUCTIONS    Wound cleansing:     Do not scrub or use excessive force.   Wash hands with soap and water before and after dressing changes.   Prior to applying a clean dressing, cleanse wound with normal saline,               wound cleanser, or mild soap and water.    Ask the physician or nurse before getting the wound(s) wet in a shower      Wound Care Notes:  Cardiology: Dr Scott   S/P CABG          Orders for this week:  10/10/2024       Left Lower Leg :Wash with soap and water. Pat dry.   Wrap with tubi F or G  Change: Leave in place for 1 week     Dispense 30 day quantity when ordering supplies.  Plan:        Follow Up Instructions: At the Wound Care Center in 1 week: Discharged   Primary Wound Care Provider: Carie Bowman CNP   Call  for any questions or concerns.  Central [phone] for imaging and lab work    Treatment Note      Written Patient Dismissal Instructions Given            Electronically signed by Kasi Rojas MD on 10/10/2024 at 10:07 AM

## 2024-10-10 NOTE — PATIENT INSTRUCTIONS
PHYSICIAN ORDERS AND DISCHARGE INSTRUCTIONS    Wound cleansing:     Do not scrub or use excessive force.   Wash hands with soap and water before and after dressing changes.   Prior to applying a clean dressing, cleanse wound with normal saline,               wound cleanser, or mild soap and water.    Ask the physician or nurse before getting the wound(s) wet in a shower      Wound Care Notes:  Cardiology: Dr Scott   S/P CABG          Orders for this week:  10/10/2024       Left Lower Leg :Wash with soap and water. Pat dry.   Wrap with tubi F or G  Change: Leave in place for 1 week     Dispense 30 day quantity when ordering supplies.  Plan:        Follow Up Instructions: At the Wound Care Center in 1 week: Discharged   Primary Wound Care Provider: Carie Bowman CNP   Call  for any questions or concerns.  Central [phone] for imaging and lab work

## 2024-10-16 ENCOUNTER — HOSPITAL ENCOUNTER (OUTPATIENT)
Dept: CARDIAC REHAB | Age: 69
Setting detail: THERAPIES SERIES
Discharge: HOME | End: 2024-10-16
Payer: COMMERCIAL

## 2024-10-16 PROCEDURE — G0422 INTENS CARDIAC REHAB W/EXERC: HCPCS

## 2024-10-16 PROCEDURE — G0423 INTENS CARDIAC REHAB NO EXER: HCPCS

## 2024-10-21 ENCOUNTER — HOSPITAL ENCOUNTER (OUTPATIENT)
Dept: CARDIAC REHAB | Age: 69
Setting detail: THERAPIES SERIES
Discharge: HOME | End: 2024-10-21
Payer: COMMERCIAL

## 2024-10-21 PROCEDURE — G0422 INTENS CARDIAC REHAB W/EXERC: HCPCS

## 2024-10-22 ENCOUNTER — HOSPITAL ENCOUNTER (OUTPATIENT)
Dept: CARDIAC REHAB | Age: 69
Setting detail: THERAPIES SERIES
Discharge: HOME | End: 2024-10-22
Payer: COMMERCIAL

## 2024-10-22 PROCEDURE — G0422 INTENS CARDIAC REHAB W/EXERC: HCPCS

## 2024-10-24 ENCOUNTER — HOSPITAL ENCOUNTER (OUTPATIENT)
Dept: CARDIAC REHAB | Age: 69
Setting detail: THERAPIES SERIES
Discharge: HOME | End: 2024-10-24
Payer: COMMERCIAL

## 2024-10-24 PROCEDURE — G0422 INTENS CARDIAC REHAB W/EXERC: HCPCS

## 2024-10-28 ENCOUNTER — HOSPITAL ENCOUNTER (OUTPATIENT)
Dept: CARDIAC REHAB | Age: 69
Setting detail: THERAPIES SERIES
Discharge: HOME | End: 2024-10-28
Payer: COMMERCIAL

## 2024-10-28 PROCEDURE — G0422 INTENS CARDIAC REHAB W/EXERC: HCPCS

## 2024-10-29 ENCOUNTER — HOSPITAL ENCOUNTER (OUTPATIENT)
Dept: CARDIAC REHAB | Age: 69
Setting detail: THERAPIES SERIES
Discharge: HOME | End: 2024-10-29
Payer: COMMERCIAL

## 2024-10-29 PROCEDURE — G0422 INTENS CARDIAC REHAB W/EXERC: HCPCS

## 2024-10-31 ENCOUNTER — HOSPITAL ENCOUNTER (OUTPATIENT)
Dept: CARDIAC REHAB | Age: 69
Setting detail: THERAPIES SERIES
Discharge: HOME | End: 2024-10-31
Payer: COMMERCIAL

## 2024-10-31 PROCEDURE — G0422 INTENS CARDIAC REHAB W/EXERC: HCPCS

## 2024-11-04 NOTE — PROGRESS NOTES
Cardiothoracic Surgery   Postoperative Follow Up    Cardiologist:  Dr Scott     Procedure:  CABGx3 (LIMA-LAD, SVG-OM1, SVG-PDA) on 8/16/2024.       HISTORY OF PRESENT ILLNESS       Mark Bourgeois is a 69 y.o. male who presents today for a postoperative follow up appointment.     Pt is doing well, continues to improve, increasing activity level as tolerated - he was previously referred to the wound clinic for a blister on his EVH site that was draining. This has since healed. He has started cardiac rehab and is doing well.   Of note, he noticed bleeding from his EVH site this morning and applied a dressing himself. No injuries or other complaints. He denies having any pustular drainage prior to bleeding.    OBJECTIVE   VITALS:  BP (!) 142/84 (Site: Left Upper Arm, Position: Sitting, Cuff Size: Large Adult)   Pulse 99   Ht 1.778 m (5' 10\")   Wt 129.5 kg (285 lb 9.6 oz)   SpO2 96%   BMI 40.98 kg/m²      Physical Exam:  Gen: alert, well appearing, and in no distress, oriented to person, place, and time, and normal appearing weight  Chest:  Sternotomy site well approximated, clean and dry.  No signs of erythema, swelling, or drainage. Chest tube insertion sites clean and dry.  No signs of infection.  Lung: clear to auscultation, no wheezes or rales, and unlabored breathing  Heart: S1 and S2 normal, no murmur, click, gallop or rub  Extremities: peripheral pulses normal, no pedal edema, no clubbing or cyanosis       LLE EVH Site: dark venous oozing from incision, possibly from old hematoma. No erythema, warmth, or edema noted. Incision otherwise healed except for small area in mid-distal incision. Ecchymosis noted. No sign of infection        Assessment:     S/p CABGx3 (LIMA-LAD, SVG-OM1, SVG-PDA) on 8/16/2024.         Plan     Pressure dressing applied to LLE in office. Incision still oozing after 30+ minutes.   Area cleansed with betadine and injected with lidocaine 1%. Incision sutured closed and bleeding

## 2024-11-05 ENCOUNTER — OFFICE VISIT (OUTPATIENT)
Dept: CARDIOTHORACIC SURGERY | Age: 69
End: 2024-11-05

## 2024-11-05 VITALS
WEIGHT: 285.6 LBS | DIASTOLIC BLOOD PRESSURE: 84 MMHG | BODY MASS INDEX: 40.89 KG/M2 | SYSTOLIC BLOOD PRESSURE: 142 MMHG | HEIGHT: 70 IN | HEART RATE: 99 BPM | OXYGEN SATURATION: 96 %

## 2024-11-05 DIAGNOSIS — Z09 POSTOPERATIVE FOLLOW-UP: Primary | ICD-10-CM

## 2024-11-06 ENCOUNTER — TELEPHONE (OUTPATIENT)
Dept: CARDIOTHORACIC SURGERY | Age: 69
End: 2024-11-06

## 2024-11-06 NOTE — TELEPHONE ENCOUNTER
Patient saw Dr. Zavala on 11/5/2024    He was told to call the office if he was still having bleeding.     He reports he is still bleeding, just not as bad.

## 2024-11-06 NOTE — TELEPHONE ENCOUNTER
Mark Bourgeois is a 69-year-old male was seen in our outpatient office by Dr. Zavala yesterday for bleeding out of his EVH incision.  He was told to call our office today with an update.    I called him back in around 1 PM and he said the bleeding has slowed but has continued.  I talk with Dr. Zavala and we determine the plan to be pressure dressing with gauze and wrap with Ace wrap for 1 more day.  Hold the Plavix for 3 days.  I told the patient to call our office tomorrow 11/7/24 for an update unless he becomes symptomatic with dizziness lightheadedness or anxiety about his incision and then to come to the emergency room.

## 2024-11-07 ENCOUNTER — OFFICE VISIT (OUTPATIENT)
Dept: CARDIOTHORACIC SURGERY | Age: 69
End: 2024-11-07

## 2024-11-07 VITALS
HEIGHT: 70 IN | OXYGEN SATURATION: 94 % | BODY MASS INDEX: 41.37 KG/M2 | WEIGHT: 289 LBS | DIASTOLIC BLOOD PRESSURE: 92 MMHG | SYSTOLIC BLOOD PRESSURE: 174 MMHG | HEART RATE: 77 BPM

## 2024-11-07 DIAGNOSIS — Z09 POSTOPERATIVE FOLLOW-UP: Primary | ICD-10-CM

## 2024-11-07 PROCEDURE — 99024 POSTOP FOLLOW-UP VISIT: CPT | Performed by: PHYSICIAN ASSISTANT

## 2024-11-07 NOTE — TELEPHONE ENCOUNTER
Patient calling stating that his incision is still bleeding. Denies any worsening or improvement with the bleeding. Patient states that as soon as he removes the ACE bandage, the incision begins to bleed. Please advise.

## 2024-11-07 NOTE — PROGRESS NOTES
Cardiothoracic Surgery   Postoperative Follow Up    Cardiologist:  Dr Scott     Procedure:  CABGx3 (LIMA-LAD, SVG-OM1, SVG-PDA) on 8/16/2024.       HISTORY OF PRESENT ILLNESS       Mark Bourgeois is a 69 y.o. male who presents today for a postoperative follow up appointment.    He is here today for a  wound check. Suture was placed in his LLE EVH incision on Tuesday due to bleeding. ACE wrap was placed for a pressure dressing however patient states he continued to have bleeding whenever he removed the dressing. It also soaked into the ACE bandage a little.   Stopped Plavix yesterday. Remains on ASA 81mg daily.    OBJECTIVE   VITALS:  BP (!) 174/92 (Site: Left Upper Arm, Position: Sitting, Cuff Size: Large Adult)   Pulse 77   Ht 1.778 m (5' 10\")   Wt 131.1 kg (289 lb)   SpO2 94%   BMI 41.47 kg/m²      Physical Exam:  Gen: alert, well appearing, and in no distress, oriented to person, place, and time, and normal appearing weight  Chest:  Sternotomy site well approximated, clean and dry.  No signs of erythema, swelling, or drainage. Chest tube insertion sites clean and dry.  No signs of infection.  Lung: clear to auscultation, no wheezes or rales, and unlabored breathing  Heart: S1 and S2 normal, no murmur, click, gallop or rub  Extremities: peripheral pulses normal, no pedal edema, no clubbing or cyanosis       LLE EVH Site: suture in place with small area still open. No bleeding noted today. otherwise healed except for small area in mid-distal incision. Some Ecchymosis noted. No sign of infection.         Assessment:     S/p CABGx3 (LIMA-LAD, SVG-OM1, SVG-PDA) on 8/16/2024.         Plan     Bacitracin and new pressure dressing/ACE bandage applied to incision  Recommend wearing compression stockings until ACE bandage is able to be removed.   Recommend leaving in place until atleast Saturday unless dressing needs changed.   RTO on Tuesday for wound check.   Continue holding Plavix.

## 2024-11-11 NOTE — PROGRESS NOTES
Cardiothoracic Surgery   Postoperative Follow Up    Cardiologist:  Dr Scott     Procedure:  CABGx3 (LIMA-LAD, SVG-OM1, SVG-PDA) on 8/16/2024.       HISTORY OF PRESENT ILLNESS       Mark Bourgeois is a 69 y.o. male who presents today for a postoperative follow up appointment. ACE bandage remained on LLE until Saturday. Patient reports no bleeding. ACE bandage reapplied and remains in place today. Wearing compression stockings as instructed. Still holding Plavix.        OBJECTIVE   VITALS:  BP (!) 144/88 (Site: Left Upper Arm, Position: Sitting, Cuff Size: Large Adult)   Pulse 94   Ht 1.778 m (5' 10\")   Wt 130.7 kg (288 lb 2 oz)   SpO2 98%   BMI 41.34 kg/m²      Physical Exam:  Gen: alert, well appearing, and in no distress, oriented to person, place, and time, and normal appearing weight  Chest:  Sternotomy site well approximated, clean and dry.  No signs of erythema, swelling, or drainage. Chest tube insertion sites clean and dry.  No signs of infection.  Lung: clear to auscultation, no wheezes or rales, and unlabored breathing  Heart: S1 and S2 normal, no murmur, click, gallop or rub  Extremities: peripheral pulses normal, no pedal edema, no clubbing or cyanosis       LLE EVH Site: suture in place with small area still open. No bleeding noted today. otherwise healed except for small area in mid-distal incision.  No sign of infection. LLE edema        Assessment:     S/p CABGx3 (LIMA-LAD, SVG-OM1, SVG-PDA) on 8/16/2024.         Plan     RTO in 2 weeks for suture removal  Continue ACE bandage and compression stockings.  Continue holding Plavix

## 2024-11-12 ENCOUNTER — OFFICE VISIT (OUTPATIENT)
Dept: CARDIOTHORACIC SURGERY | Age: 69
End: 2024-11-12

## 2024-11-12 VITALS
BODY MASS INDEX: 41.25 KG/M2 | HEIGHT: 70 IN | WEIGHT: 288.13 LBS | OXYGEN SATURATION: 98 % | HEART RATE: 94 BPM | DIASTOLIC BLOOD PRESSURE: 88 MMHG | SYSTOLIC BLOOD PRESSURE: 144 MMHG

## 2024-11-12 DIAGNOSIS — Z09 POSTOPERATIVE FOLLOW-UP: Primary | ICD-10-CM

## 2024-11-25 NOTE — PROGRESS NOTES
Cardiothoracic Surgery   Postoperative Follow Up    Cardiologist:  Dr Scott     Procedure:  CABGx3 (LIMA-LAD, SVG-OM1, SVG-PDA) on 8/16/2024.       HISTORY OF PRESENT ILLNESS       Mark Bourgeois is a 69 y.o. male who presents today for a postoperative follow up appointment. He denies having any bleeding from his leg incision other than a very small amount in the AM. ACE bandage remains in place      OBJECTIVE   VITALS:  /84 (Site: Left Upper Arm, Position: Sitting, Cuff Size: Large Adult)   Pulse 80   Ht 1.778 m (5' 10\")   Wt 130.7 kg (288 lb 2 oz)   SpO2 95%   BMI 41.34 kg/m²      Physical Exam:  Gen: alert, well appearing, and in no distress, oriented to person, place, and time, and normal appearing weight  Chest:  Sternotomy site well approximated, clean and dry.  No signs of erythema, swelling, or drainage. Chest tube insertion sites clean and dry.  No signs of infection.  Lung: clear to auscultation, no wheezes or rales, and unlabored breathing  Heart: S1 and S2 normal, no murmur, click, gallop or rub  Extremities: peripheral pulses normal, no pedal edema, no clubbing or cyanosis       LLE EVH Site: suture in place with very small area still open. No bleeding noted today. otherwise healed except for small area in mid-distal incision.  No sign of infection.         Assessment:     S/p CABGx3 (LIMA-LAD, SVG-OM1, SVG-PDA) on 8/16/2024.         Plan     Suture removed without complication. No bleeding.  Continue holding Plavix until Monday 12/2. If bleeding occurs after taking Plavix then patient will call the office.  RTO 3 weeks for wound check

## 2024-11-26 ENCOUNTER — OFFICE VISIT (OUTPATIENT)
Dept: CARDIOTHORACIC SURGERY | Age: 69
End: 2024-11-26

## 2024-11-26 VITALS
OXYGEN SATURATION: 95 % | DIASTOLIC BLOOD PRESSURE: 84 MMHG | HEIGHT: 70 IN | HEART RATE: 80 BPM | BODY MASS INDEX: 41.25 KG/M2 | SYSTOLIC BLOOD PRESSURE: 128 MMHG | WEIGHT: 288.13 LBS

## 2024-11-26 DIAGNOSIS — Z09 POSTOPERATIVE FOLLOW-UP: Primary | ICD-10-CM

## 2024-11-26 PROCEDURE — 99024 POSTOP FOLLOW-UP VISIT: CPT | Performed by: THORACIC SURGERY (CARDIOTHORACIC VASCULAR SURGERY)

## 2024-12-04 ENCOUNTER — ANESTHESIA EVENT (OUTPATIENT)
Dept: OPERATING ROOM | Age: 69
End: 2024-12-04
Payer: COMMERCIAL

## 2024-12-04 ENCOUNTER — OFFICE VISIT (OUTPATIENT)
Dept: CARDIOTHORACIC SURGERY | Age: 69
End: 2024-12-04
Payer: COMMERCIAL

## 2024-12-04 VITALS
HEIGHT: 70 IN | BODY MASS INDEX: 41.03 KG/M2 | WEIGHT: 286.6 LBS | HEART RATE: 93 BPM | DIASTOLIC BLOOD PRESSURE: 94 MMHG | SYSTOLIC BLOOD PRESSURE: 153 MMHG | OXYGEN SATURATION: 96 %

## 2024-12-04 DIAGNOSIS — L02.416 ABSCESS OF LEFT LOWER EXTREMITY: ICD-10-CM

## 2024-12-04 DIAGNOSIS — L02.416 ABSCESS OF LEFT LOWER EXTREMITY: Primary | ICD-10-CM

## 2024-12-04 PROCEDURE — 3077F SYST BP >= 140 MM HG: CPT | Performed by: PHYSICIAN ASSISTANT

## 2024-12-04 PROCEDURE — 1123F ACP DISCUSS/DSCN MKR DOCD: CPT | Performed by: PHYSICIAN ASSISTANT

## 2024-12-04 PROCEDURE — 3080F DIAST BP >= 90 MM HG: CPT | Performed by: PHYSICIAN ASSISTANT

## 2024-12-04 PROCEDURE — 99214 OFFICE O/P EST MOD 30 MIN: CPT | Performed by: PHYSICIAN ASSISTANT

## 2024-12-04 NOTE — PROGRESS NOTES
Surgery @ [facility] on 12/5/24, arrival at 0630 for surgery at 0830.    NOTHING TO EAT OR DRINK AFTER MIDNIGHT DAY OF SURGERY    1. Enter thru the hospital main entrance on day of surgery, check in at the Information Desk. If you arrive prior to 6:00am, enter thru the ER entrance.    2. Follow the directions as prescribed by the doctor for your procedure and medications.         Morning of surgery take: Metoprolol and Pepcid with a sip of water.          Stop vitamins, supplements and NSAIDS:  NOW.     Hasn't had Plavix in two weeks but took ASA today.     3. Check with your Doctor regarding stopping blood thinners and follow their instructions.    4. Do not smoke, vape or use chewing tobacco morning of surgery. Do not drink any alcoholic beverages 24 hours prior to surgery.       This includes NA Beer. No street drugs 7 days prior to surgery.    5. If you have dentures, contacts of glasses they will be removed before going to the OR; please bring a case.    6. Please bring picture ID, insurance card, paperwork from the doctor’s office (H & P, Consent, & card for implantable devices).    7. Take a shower with an antibacterial soap the night before surgery and the morning of surgery. Do not put anything on your skin      After your morning shower.    8. You will need a responsible adult to drive you home and check on you after surgery.

## 2024-12-04 NOTE — ANESTHESIA PRE PROCEDURE
Department of Anesthesiology  Preprocedure Note       Name:  Mark Bourgeois   Age:  69 y.o.  :  1955                                          MRN:  [id number]         Date:  2024      Surgeon: Surgeon(s):  Eddie Zavala MD    Procedure: Procedure(s):  LEFT LOWER EXTREMITY EXPLORATION WITH WASHOUT    Medications prior to admission:   Prior to Admission medications    Medication Sig Start Date End Date Taking? Authorizing Provider   atorvastatin (LIPITOR) 80 MG tablet Take 1 tablet by mouth daily 10/3/24   Derek Hernández MD   furosemide (LASIX) 40 MG tablet Take 1 tablet by mouth daily 24   Derek Hernández MD   clopidogrel (PLAVIX) 75 MG tablet Take 1 tablet by mouth daily 24   Derek Hernández MD   metoprolol succinate (TOPROL XL) 100 MG extended release tablet Take 1.5 tablets by mouth daily for 90 doses 24  Derek Hernández MD   aspirin 81 MG EC tablet Take 1 tablet by mouth daily 24   Derek Hernández MD   potassium chloride (KLOR-CON M) 20 MEQ extended release tablet Take 1 tablet by mouth 2 times daily 24   Derek Hernández MD   lactobacillus (CULTURELLE) capsule Take 1 capsule by mouth daily (with breakfast) 24  Hyacinth Garcia MD   famotidine (PEPCID) 40 MG tablet Take 1 tablet by mouth 2 times daily 24   Hyacinth Garcia MD   acetaminophen (TYLENOL) 500 MG tablet Take 2 tablets by mouth every 6 hours as needed for Pain 24   Graciela Chavez PA-C       Current medications:    No current facility-administered medications for this encounter.     Current Outpatient Medications   Medication Sig Dispense Refill   • atorvastatin (LIPITOR) 80 MG tablet Take 1 tablet by mouth daily 90 tablet 3   • furosemide (LASIX) 40 MG tablet Take 1 tablet by mouth daily 90 tablet 3   • clopidogrel (PLAVIX) 75 MG tablet Take 1 tablet by mouth daily 30 tablet 3   • metoprolol succinate (TOPROL XL) 100 MG extended release tablet Take 1.5

## 2024-12-05 ENCOUNTER — ANESTHESIA (OUTPATIENT)
Dept: OPERATING ROOM | Age: 69
End: 2024-12-05
Payer: COMMERCIAL

## 2024-12-05 ENCOUNTER — APPOINTMENT (OUTPATIENT)
Dept: GENERAL RADIOLOGY | Age: 69
DRG: 908 | End: 2024-12-05
Attending: THORACIC SURGERY (CARDIOTHORACIC VASCULAR SURGERY)
Payer: COMMERCIAL

## 2024-12-05 ENCOUNTER — HOSPITAL ENCOUNTER (INPATIENT)
Age: 69
LOS: 1 days | Discharge: HOME | DRG: 908 | End: 2024-12-06
Attending: THORACIC SURGERY (CARDIOTHORACIC VASCULAR SURGERY) | Admitting: THORACIC SURGERY (CARDIOTHORACIC VASCULAR SURGERY)
Payer: COMMERCIAL

## 2024-12-05 DIAGNOSIS — L02.416 ABSCESS OF LEFT LOWER EXTREMITY: ICD-10-CM

## 2024-12-05 DIAGNOSIS — T81.89XS NONHEALING SURGICAL WOUND, SEQUELA: Primary | ICD-10-CM

## 2024-12-05 PROBLEM — B99.9 INFECTION: Status: ACTIVE | Noted: 2024-12-05

## 2024-12-05 LAB
ABO + RH BLD: NORMAL
ANION GAP SERPL CALCULATED.3IONS-SCNC: 10 MMOL/L (ref 9–17)
ANION GAP SERPL CALCULATED.3IONS-SCNC: 11 MMOL/L (ref 9–17)
BASOPHILS # BLD: 0.02 K/UL
BASOPHILS NFR BLD: 0 % (ref 0–1)
BLOOD BANK SAMPLE EXPIRATION: NORMAL
BLOOD GROUP ANTIBODIES SERPL: NEGATIVE
BUN SERPL-MCNC: 22 MG/DL (ref 7–20)
BUN SERPL-MCNC: 23 MG/DL (ref 7–20)
CALCIUM SERPL-MCNC: 8.7 MG/DL (ref 8.3–10.6)
CALCIUM SERPL-MCNC: 9.6 MG/DL (ref 8.3–10.6)
CHLORIDE SERPL-SCNC: 105 MMOL/L (ref 99–110)
CHLORIDE SERPL-SCNC: 106 MMOL/L (ref 99–110)
CO2 SERPL-SCNC: 25 MMOL/L (ref 21–32)
CO2 SERPL-SCNC: 25 MMOL/L (ref 21–32)
CREAT SERPL-MCNC: 1.1 MG/DL (ref 0.8–1.3)
CREAT SERPL-MCNC: 1.1 MG/DL (ref 0.8–1.3)
EOSINOPHIL # BLD: 0.02 K/UL
EOSINOPHILS RELATIVE PERCENT: 0 % (ref 0–3)
ERYTHROCYTE [DISTWIDTH] IN BLOOD BY AUTOMATED COUNT: 14.6 % (ref 11.7–14.9)
ERYTHROCYTE [DISTWIDTH] IN BLOOD BY AUTOMATED COUNT: 14.6 % (ref 11.7–14.9)
GFR, ESTIMATED: 64 ML/MIN/1.73M2
GFR, ESTIMATED: 66 ML/MIN/1.73M2
GLUCOSE BLD-MCNC: 112 MG/DL (ref 74–99)
GLUCOSE BLD-MCNC: 117 MG/DL (ref 74–99)
GLUCOSE SERPL-MCNC: 109 MG/DL (ref 74–99)
GLUCOSE SERPL-MCNC: 120 MG/DL (ref 74–99)
HCT VFR BLD AUTO: 45.3 % (ref 42–52)
HCT VFR BLD AUTO: 45.7 % (ref 42–52)
HGB BLD-MCNC: 14.1 G/DL (ref 13.5–18)
HGB BLD-MCNC: 14.2 G/DL (ref 13.5–18)
IMM GRANULOCYTES # BLD AUTO: 0.09 K/UL
IMM GRANULOCYTES NFR BLD: 1 %
INR PPP: 1.1
LYMPHOCYTES NFR BLD: 1.06 K/UL
LYMPHOCYTES RELATIVE PERCENT: 13 % (ref 24–44)
MCH RBC QN AUTO: 25.1 PG (ref 27–31)
MCH RBC QN AUTO: 25.2 PG (ref 27–31)
MCHC RBC AUTO-ENTMCNC: 30.9 G/DL (ref 32–36)
MCHC RBC AUTO-ENTMCNC: 31.3 G/DL (ref 32–36)
MCV RBC AUTO: 80 FL (ref 78–100)
MCV RBC AUTO: 81.8 FL (ref 78–100)
MONOCYTES NFR BLD: 0.15 K/UL
MONOCYTES NFR BLD: 2 % (ref 0–4)
NEUTROPHILS NFR BLD: 83 % (ref 36–66)
NEUTS SEG NFR BLD: 6.65 K/UL
PARTIAL THROMBOPLASTIN TIME: 33.5 SEC (ref 25.1–37.1)
PLATELET # BLD AUTO: 293 K/UL (ref 140–440)
PLATELET # BLD AUTO: 303 K/UL (ref 140–440)
PMV BLD AUTO: 9.6 FL (ref 7.5–11.1)
PMV BLD AUTO: 9.6 FL (ref 7.5–11.1)
POTASSIUM SERPL-SCNC: 3.9 MMOL/L (ref 3.5–5.1)
POTASSIUM SERPL-SCNC: 4.3 MMOL/L (ref 3.5–5.1)
PROTHROMBIN TIME: 14.2 SEC (ref 11.7–14.5)
RBC # BLD AUTO: 5.59 M/UL (ref 4.6–6.2)
RBC # BLD AUTO: 5.66 M/UL (ref 4.6–6.2)
SODIUM SERPL-SCNC: 141 MMOL/L (ref 136–145)
SODIUM SERPL-SCNC: 141 MMOL/L (ref 136–145)
WBC OTHER # BLD: 6.9 K/UL (ref 4–10.5)
WBC OTHER # BLD: 8 K/UL (ref 4–10.5)

## 2024-12-05 PROCEDURE — 3600000002 HC SURGERY LEVEL 2 BASE: Performed by: THORACIC SURGERY (CARDIOTHORACIC VASCULAR SURGERY)

## 2024-12-05 PROCEDURE — 6370000000 HC RX 637 (ALT 250 FOR IP): Performed by: PHYSICIAN ASSISTANT

## 2024-12-05 PROCEDURE — 3700000000 HC ANESTHESIA ATTENDED CARE: Performed by: THORACIC SURGERY (CARDIOTHORACIC VASCULAR SURGERY)

## 2024-12-05 PROCEDURE — 88300 SURGICAL PATH GROSS: CPT

## 2024-12-05 PROCEDURE — 6360000002 HC RX W HCPCS: Performed by: ANESTHESIOLOGY

## 2024-12-05 PROCEDURE — 85027 COMPLETE CBC AUTOMATED: CPT

## 2024-12-05 PROCEDURE — 6360000002 HC RX W HCPCS

## 2024-12-05 PROCEDURE — 7100000001 HC PACU RECOVERY - ADDTL 15 MIN: Performed by: THORACIC SURGERY (CARDIOTHORACIC VASCULAR SURGERY)

## 2024-12-05 PROCEDURE — 85730 THROMBOPLASTIN TIME PARTIAL: CPT

## 2024-12-05 PROCEDURE — 87076 CULTURE ANAEROBE IDENT EACH: CPT

## 2024-12-05 PROCEDURE — 0JCH0ZZ EXTIRPATION OF MATTER FROM LEFT LOWER ARM SUBCUTANEOUS TISSUE AND FASCIA, OPEN APPROACH: ICD-10-PCS | Performed by: THORACIC SURGERY (CARDIOTHORACIC VASCULAR SURGERY)

## 2024-12-05 PROCEDURE — 2580000003 HC RX 258: Performed by: PHYSICIAN ASSISTANT

## 2024-12-05 PROCEDURE — 3600000012 HC SURGERY LEVEL 2 ADDTL 15MIN: Performed by: THORACIC SURGERY (CARDIOTHORACIC VASCULAR SURGERY)

## 2024-12-05 PROCEDURE — G0378 HOSPITAL OBSERVATION PER HR: HCPCS

## 2024-12-05 PROCEDURE — 85610 PROTHROMBIN TIME: CPT

## 2024-12-05 PROCEDURE — 82962 GLUCOSE BLOOD TEST: CPT

## 2024-12-05 PROCEDURE — 2100000000 HC CCU R&B

## 2024-12-05 PROCEDURE — 2500000003 HC RX 250 WO HCPCS

## 2024-12-05 PROCEDURE — 6360000002 HC RX W HCPCS: Performed by: PHYSICIAN ASSISTANT

## 2024-12-05 PROCEDURE — 86900 BLOOD TYPING SEROLOGIC ABO: CPT

## 2024-12-05 PROCEDURE — 87205 SMEAR GRAM STAIN: CPT

## 2024-12-05 PROCEDURE — 3700000001 HC ADD 15 MINUTES (ANESTHESIA): Performed by: THORACIC SURGERY (CARDIOTHORACIC VASCULAR SURGERY)

## 2024-12-05 PROCEDURE — 80048 BASIC METABOLIC PNL TOTAL CA: CPT

## 2024-12-05 PROCEDURE — 87075 CULTR BACTERIA EXCEPT BLOOD: CPT

## 2024-12-05 PROCEDURE — 85025 COMPLETE CBC W/AUTO DIFF WBC: CPT

## 2024-12-05 PROCEDURE — 73590 X-RAY EXAM OF LOWER LEG: CPT

## 2024-12-05 PROCEDURE — 2709999900 HC NON-CHARGEABLE SUPPLY: Performed by: THORACIC SURGERY (CARDIOTHORACIC VASCULAR SURGERY)

## 2024-12-05 PROCEDURE — 86901 BLOOD TYPING SEROLOGIC RH(D): CPT

## 2024-12-05 PROCEDURE — 6370000000 HC RX 637 (ALT 250 FOR IP): Performed by: THORACIC SURGERY (CARDIOTHORACIC VASCULAR SURGERY)

## 2024-12-05 PROCEDURE — 7100000000 HC PACU RECOVERY - FIRST 15 MIN: Performed by: THORACIC SURGERY (CARDIOTHORACIC VASCULAR SURGERY)

## 2024-12-05 PROCEDURE — 87070 CULTURE OTHR SPECIMN AEROBIC: CPT

## 2024-12-05 PROCEDURE — 86850 RBC ANTIBODY SCREEN: CPT

## 2024-12-05 PROCEDURE — 94761 N-INVAS EAR/PLS OXIMETRY MLT: CPT

## 2024-12-05 RX ORDER — POTASSIUM CHLORIDE 1500 MG/1
20 TABLET, EXTENDED RELEASE ORAL 2 TIMES DAILY
Status: DISCONTINUED | OUTPATIENT
Start: 2024-12-05 | End: 2024-12-06 | Stop reason: HOSPADM

## 2024-12-05 RX ORDER — SODIUM CHLORIDE, SODIUM LACTATE, POTASSIUM CHLORIDE, CALCIUM CHLORIDE 600; 310; 30; 20 MG/100ML; MG/100ML; MG/100ML; MG/100ML
INJECTION, SOLUTION INTRAVENOUS CONTINUOUS
Status: DISCONTINUED | OUTPATIENT
Start: 2024-12-05 | End: 2024-12-05

## 2024-12-05 RX ORDER — KETOROLAC TROMETHAMINE 30 MG/ML
15 INJECTION, SOLUTION INTRAMUSCULAR; INTRAVENOUS EVERY 6 HOURS
Status: DISCONTINUED | OUTPATIENT
Start: 2024-12-05 | End: 2024-12-05

## 2024-12-05 RX ORDER — FAMOTIDINE 20 MG/1
40 TABLET, FILM COATED ORAL 2 TIMES DAILY
Status: DISCONTINUED | OUTPATIENT
Start: 2024-12-05 | End: 2024-12-06 | Stop reason: HOSPADM

## 2024-12-05 RX ORDER — SODIUM CHLORIDE 9 MG/ML
INJECTION, SOLUTION INTRAVENOUS PRN
Status: DISCONTINUED | OUTPATIENT
Start: 2024-12-05 | End: 2024-12-05

## 2024-12-05 RX ORDER — VANCOMYCIN 2 G/400ML
2000 INJECTION, SOLUTION INTRAVENOUS
Status: DISCONTINUED | OUTPATIENT
Start: 2024-12-05 | End: 2024-12-05 | Stop reason: SDUPTHER

## 2024-12-05 RX ORDER — SODIUM CHLORIDE, SODIUM LACTATE, POTASSIUM CHLORIDE, CALCIUM CHLORIDE 600; 310; 30; 20 MG/100ML; MG/100ML; MG/100ML; MG/100ML
INJECTION, SOLUTION INTRAVENOUS CONTINUOUS
Status: DISCONTINUED | OUTPATIENT
Start: 2024-12-05 | End: 2024-12-05 | Stop reason: HOSPADM

## 2024-12-05 RX ORDER — FUROSEMIDE 20 MG/1
40 TABLET ORAL DAILY
Status: DISCONTINUED | OUTPATIENT
Start: 2024-12-05 | End: 2024-12-06 | Stop reason: HOSPADM

## 2024-12-05 RX ORDER — MIDAZOLAM HYDROCHLORIDE 1 MG/ML
INJECTION, SOLUTION INTRAMUSCULAR; INTRAVENOUS
Status: DISCONTINUED | OUTPATIENT
Start: 2024-12-05 | End: 2024-12-05 | Stop reason: SDUPTHER

## 2024-12-05 RX ORDER — FENTANYL CITRATE 50 UG/ML
50 INJECTION, SOLUTION INTRAMUSCULAR; INTRAVENOUS EVERY 5 MIN PRN
Status: DISCONTINUED | OUTPATIENT
Start: 2024-12-05 | End: 2024-12-05

## 2024-12-05 RX ORDER — CELECOXIB 200 MG/1
200 CAPSULE ORAL 2 TIMES DAILY
Status: DISCONTINUED | OUTPATIENT
Start: 2024-12-07 | End: 2024-12-05

## 2024-12-05 RX ORDER — SODIUM CHLORIDE 0.9 % (FLUSH) 0.9 %
5-40 SYRINGE (ML) INJECTION EVERY 12 HOURS SCHEDULED
Status: DISCONTINUED | OUTPATIENT
Start: 2024-12-05 | End: 2024-12-05 | Stop reason: HOSPADM

## 2024-12-05 RX ORDER — VANCOMYCIN 2 G/400ML
2000 INJECTION, SOLUTION INTRAVENOUS
Status: DISCONTINUED | OUTPATIENT
Start: 2024-12-05 | End: 2024-12-05

## 2024-12-05 RX ORDER — HYDRALAZINE HYDROCHLORIDE 20 MG/ML
10 INJECTION INTRAMUSCULAR; INTRAVENOUS
Status: DISCONTINUED | OUTPATIENT
Start: 2024-12-05 | End: 2024-12-05

## 2024-12-05 RX ORDER — SODIUM CHLORIDE 0.9 % (FLUSH) 0.9 %
5-40 SYRINGE (ML) INJECTION PRN
Status: DISCONTINUED | OUTPATIENT
Start: 2024-12-05 | End: 2024-12-05 | Stop reason: HOSPADM

## 2024-12-05 RX ORDER — DROPERIDOL 2.5 MG/ML
0.62 INJECTION, SOLUTION INTRAMUSCULAR; INTRAVENOUS
Status: DISCONTINUED | OUTPATIENT
Start: 2024-12-05 | End: 2024-12-05

## 2024-12-05 RX ORDER — FENTANYL CITRATE 50 UG/ML
INJECTION, SOLUTION INTRAMUSCULAR; INTRAVENOUS
Status: DISCONTINUED | OUTPATIENT
Start: 2024-12-05 | End: 2024-12-05 | Stop reason: SDUPTHER

## 2024-12-05 RX ORDER — GABAPENTIN 300 MG/1
300 CAPSULE ORAL 3 TIMES DAILY
Status: DISCONTINUED | OUTPATIENT
Start: 2024-12-05 | End: 2024-12-06 | Stop reason: HOSPADM

## 2024-12-05 RX ORDER — ROCURONIUM BROMIDE 10 MG/ML
INJECTION, SOLUTION INTRAVENOUS
Status: DISCONTINUED | OUTPATIENT
Start: 2024-12-05 | End: 2024-12-05 | Stop reason: SDUPTHER

## 2024-12-05 RX ORDER — ONDANSETRON 2 MG/ML
4 INJECTION INTRAMUSCULAR; INTRAVENOUS
Status: DISCONTINUED | OUTPATIENT
Start: 2024-12-05 | End: 2024-12-05

## 2024-12-05 RX ORDER — METOPROLOL SUCCINATE 50 MG/1
50 TABLET, EXTENDED RELEASE ORAL ONCE
Status: COMPLETED | OUTPATIENT
Start: 2024-12-05 | End: 2024-12-05

## 2024-12-05 RX ORDER — NALOXONE HYDROCHLORIDE 0.4 MG/ML
INJECTION, SOLUTION INTRAMUSCULAR; INTRAVENOUS; SUBCUTANEOUS PRN
Status: DISCONTINUED | OUTPATIENT
Start: 2024-12-05 | End: 2024-12-05

## 2024-12-05 RX ORDER — LIDOCAINE HYDROCHLORIDE 20 MG/ML
INJECTION, SOLUTION EPIDURAL; INFILTRATION; INTRACAUDAL; PERINEURAL
Status: DISCONTINUED | OUTPATIENT
Start: 2024-12-05 | End: 2024-12-05 | Stop reason: SDUPTHER

## 2024-12-05 RX ORDER — ATORVASTATIN CALCIUM 40 MG/1
80 TABLET, FILM COATED ORAL DAILY
Status: DISCONTINUED | OUTPATIENT
Start: 2024-12-06 | End: 2024-12-06 | Stop reason: HOSPADM

## 2024-12-05 RX ORDER — PROPOFOL 10 MG/ML
INJECTION, EMULSION INTRAVENOUS
Status: DISCONTINUED | OUTPATIENT
Start: 2024-12-05 | End: 2024-12-05 | Stop reason: SDUPTHER

## 2024-12-05 RX ORDER — MEPERIDINE HYDROCHLORIDE 25 MG/ML
12.5 INJECTION INTRAMUSCULAR; INTRAVENOUS; SUBCUTANEOUS EVERY 5 MIN PRN
Status: DISCONTINUED | OUTPATIENT
Start: 2024-12-05 | End: 2024-12-05

## 2024-12-05 RX ORDER — TRAMADOL HYDROCHLORIDE 50 MG/1
50 TABLET ORAL EVERY 6 HOURS PRN
Status: DISCONTINUED | OUTPATIENT
Start: 2024-12-05 | End: 2024-12-06 | Stop reason: HOSPADM

## 2024-12-05 RX ORDER — SODIUM CHLORIDE 0.9 % (FLUSH) 0.9 %
5-40 SYRINGE (ML) INJECTION PRN
Status: DISCONTINUED | OUTPATIENT
Start: 2024-12-05 | End: 2024-12-05

## 2024-12-05 RX ORDER — ACETAMINOPHEN 325 MG/1
650 TABLET ORAL EVERY 4 HOURS
Status: DISCONTINUED | OUTPATIENT
Start: 2024-12-05 | End: 2024-12-06 | Stop reason: HOSPADM

## 2024-12-05 RX ORDER — METOPROLOL SUCCINATE 50 MG/1
200 TABLET, EXTENDED RELEASE ORAL DAILY
Status: DISCONTINUED | OUTPATIENT
Start: 2024-12-06 | End: 2024-12-06 | Stop reason: HOSPADM

## 2024-12-05 RX ORDER — SODIUM CHLORIDE 0.9 % (FLUSH) 0.9 %
5-40 SYRINGE (ML) INJECTION EVERY 12 HOURS SCHEDULED
Status: DISCONTINUED | OUTPATIENT
Start: 2024-12-05 | End: 2024-12-05

## 2024-12-05 RX ORDER — ONDANSETRON 2 MG/ML
INJECTION INTRAMUSCULAR; INTRAVENOUS
Status: DISCONTINUED | OUTPATIENT
Start: 2024-12-05 | End: 2024-12-05 | Stop reason: SDUPTHER

## 2024-12-05 RX ORDER — ASPIRIN 81 MG/1
81 TABLET, CHEWABLE ORAL DAILY
Status: DISCONTINUED | OUTPATIENT
Start: 2024-12-05 | End: 2024-12-06 | Stop reason: HOSPADM

## 2024-12-05 RX ORDER — SODIUM CHLORIDE 9 MG/ML
INJECTION, SOLUTION INTRAVENOUS PRN
Status: DISCONTINUED | OUTPATIENT
Start: 2024-12-05 | End: 2024-12-05 | Stop reason: HOSPADM

## 2024-12-05 RX ORDER — OXYCODONE HYDROCHLORIDE 5 MG/1
5 TABLET ORAL
Status: DISCONTINUED | OUTPATIENT
Start: 2024-12-05 | End: 2024-12-05

## 2024-12-05 RX ORDER — DEXAMETHASONE SODIUM PHOSPHATE 4 MG/ML
INJECTION, SOLUTION INTRA-ARTICULAR; INTRALESIONAL; INTRAMUSCULAR; INTRAVENOUS; SOFT TISSUE
Status: DISCONTINUED | OUTPATIENT
Start: 2024-12-05 | End: 2024-12-05 | Stop reason: SDUPTHER

## 2024-12-05 RX ORDER — LABETALOL HYDROCHLORIDE 5 MG/ML
10 INJECTION, SOLUTION INTRAVENOUS
Status: DISCONTINUED | OUTPATIENT
Start: 2024-12-05 | End: 2024-12-05

## 2024-12-05 RX ADMIN — PROPOFOL 150 MG: 10 INJECTION, EMULSION INTRAVENOUS at 09:40

## 2024-12-05 RX ADMIN — ONDANSETRON 4 MG: 2 INJECTION INTRAMUSCULAR; INTRAVENOUS at 09:45

## 2024-12-05 RX ADMIN — FUROSEMIDE 40 MG: 20 TABLET ORAL at 12:39

## 2024-12-05 RX ADMIN — LIDOCAINE HYDROCHLORIDE 60 MG: 20 INJECTION, SOLUTION EPIDURAL; INFILTRATION; INTRACAUDAL; PERINEURAL at 09:40

## 2024-12-05 RX ADMIN — GABAPENTIN 300 MG: 300 CAPSULE ORAL at 17:53

## 2024-12-05 RX ADMIN — METOPROLOL SUCCINATE 50 MG: 50 TABLET, EXTENDED RELEASE ORAL at 15:49

## 2024-12-05 RX ADMIN — FAMOTIDINE 40 MG: 20 TABLET ORAL at 21:20

## 2024-12-05 RX ADMIN — SUGAMMADEX 200 MG: 100 INJECTION, SOLUTION INTRAVENOUS at 10:17

## 2024-12-05 RX ADMIN — GABAPENTIN 300 MG: 300 CAPSULE ORAL at 21:20

## 2024-12-05 RX ADMIN — ROCURONIUM BROMIDE 50 MG: 10 INJECTION, SOLUTION INTRAVENOUS at 09:40

## 2024-12-05 RX ADMIN — ACETAMINOPHEN 650 MG: 325 TABLET ORAL at 20:09

## 2024-12-05 RX ADMIN — DEXAMETHASONE SODIUM PHOSPHATE 4 MG: 4 INJECTION, SOLUTION INTRAMUSCULAR; INTRAVENOUS at 09:45

## 2024-12-05 RX ADMIN — ASPIRIN 81 MG: 81 TABLET, CHEWABLE ORAL at 12:39

## 2024-12-05 RX ADMIN — POTASSIUM CHLORIDE 20 MEQ: 1500 TABLET, EXTENDED RELEASE ORAL at 21:20

## 2024-12-05 RX ADMIN — FENTANYL CITRATE 50 MCG: 50 INJECTION, SOLUTION INTRAMUSCULAR; INTRAVENOUS at 10:49

## 2024-12-05 RX ADMIN — SODIUM CHLORIDE, POTASSIUM CHLORIDE, SODIUM LACTATE AND CALCIUM CHLORIDE: 600; 310; 30; 20 INJECTION, SOLUTION INTRAVENOUS at 09:35

## 2024-12-05 RX ADMIN — SODIUM CHLORIDE 3000 MG: 900 INJECTION INTRAVENOUS at 09:48

## 2024-12-05 RX ADMIN — Medication: at 13:50

## 2024-12-05 RX ADMIN — MIDAZOLAM 2 MG: 1 INJECTION INTRAMUSCULAR; INTRAVENOUS at 09:32

## 2024-12-05 RX ADMIN — ACETAMINOPHEN 650 MG: 325 TABLET ORAL at 15:48

## 2024-12-05 RX ADMIN — POTASSIUM CHLORIDE 20 MEQ: 1500 TABLET, EXTENDED RELEASE ORAL at 12:40

## 2024-12-05 RX ADMIN — FENTANYL CITRATE 50 MCG: 50 INJECTION, SOLUTION INTRAMUSCULAR; INTRAVENOUS at 10:03

## 2024-12-05 RX ADMIN — FENTANYL CITRATE 50 MCG: 50 INJECTION, SOLUTION INTRAMUSCULAR; INTRAVENOUS at 09:40

## 2024-12-05 ASSESSMENT — PAIN DESCRIPTION - FREQUENCY: FREQUENCY: CONTINUOUS

## 2024-12-05 ASSESSMENT — PAIN DESCRIPTION - PAIN TYPE: TYPE: SURGICAL PAIN

## 2024-12-05 ASSESSMENT — PAIN SCALES - GENERAL
PAINLEVEL_OUTOF10: 0
PAINLEVEL_OUTOF10: 7

## 2024-12-05 ASSESSMENT — PAIN - FUNCTIONAL ASSESSMENT
PAIN_FUNCTIONAL_ASSESSMENT: 0-10
PAIN_FUNCTIONAL_ASSESSMENT: PREVENTS OR INTERFERES SOME ACTIVE ACTIVITIES AND ADLS

## 2024-12-05 ASSESSMENT — PAIN DESCRIPTION - ORIENTATION: ORIENTATION: LEFT

## 2024-12-05 ASSESSMENT — PAIN DESCRIPTION - DESCRIPTORS: DESCRIPTORS: BURNING

## 2024-12-05 ASSESSMENT — PAIN DESCRIPTION - LOCATION: LOCATION: LEG

## 2024-12-05 ASSESSMENT — PAIN DESCRIPTION - ONSET: ONSET: ON-GOING

## 2024-12-05 NOTE — PLAN OF CARE
Problem: Discharge Planning  Goal: Discharge to home or other facility with appropriate resources  Outcome: Progressing     Problem: Pain  Goal: Verbalizes/displays adequate comfort level or baseline comfort level  Outcome: Progressing  Flowsheets  Taken 12/5/2024 1137 by Annia Chavira RN  Verbalizes/displays adequate comfort level or baseline comfort level: Assess pain using appropriate pain scale  Taken 12/5/2024 1049 by Lisa Sims RN  Verbalizes/displays adequate comfort level or baseline comfort level:   Encourage patient to monitor pain and request assistance   Assess pain using appropriate pain scale   Administer analgesics based on type and severity of pain and evaluate response   Implement non-pharmacological measures as appropriate and evaluate response   Consider cultural and social influences on pain and pain management   Notify Licensed Independent Practitioner if interventions unsuccessful or patient reports new pain     Problem: Safety - Adult  Goal: Free from fall injury  Outcome: Progressing     Problem: ABCDS Injury Assessment  Goal: Absence of physical injury  Outcome: Progressing

## 2024-12-05 NOTE — PROGRESS NOTES
4 Eyes Skin Assessment     NAME:  Mark Bourgeois  YOB: 1955  MEDICAL RECORD NUMBER:  [id number]    The patient is being assessed for  Post-Op Surgical    I agree that at least one RN has performed a thorough Head to Toe Skin Assessment on the patient. ALL assessment sites listed below have been assessed.      Areas assessed by both nurses:    Head, Face, Ears, Shoulders, Back, Chest, Arms, Elbows, Hands, Sacrum. Buttock, Coccyx, Ischium, Legs. Feet and Heels, and Under Medical Devices         Does the Patient have a Wound? Yes wound(s) were present on assessment. LDA wound assessment was Initiated and completed by RN       Jacek Prevention initiated by RN: Yes  Wound Care Orders initiated by RN: Yes    Pressure Injury (Stage 3,4, Unstageable, DTI, NWPT, and Complex wounds) if present, place Wound referral order by RN under : No    New Ostomies, if present place, Ostomy referral order under : No     Nurse 1 eSignature: Electronically signed by Annia Chavira RN on 12/5/24 at 12:03 PM EST    **SHARE this note so that the co-signing nurse can place an eSignature**    Nurse 2 eSignature: Electronically signed by Niraj Luo RN on 12/5/24 at 6:27 PM EST

## 2024-12-05 NOTE — PROGRESS NOTES
Cardiothoracic Surgery   Postoperative Follow Up    Cardiologist:  Dr Scott     Procedure:  CABGx3 (LIMA-LAD, SVG-OM1, SVG-PDA) on 8/16/2024.       HISTORY OF PRESENT ILLNESS       Mark Bourgeois is a 69 y.o. male who presents today for a postoperative follow up appointment. He has developed a gold ball size lump under his incision where bleeding was prior. No current drainage. But he states it is very tender and feels like it will pop open soon. No fevers or chills.      OBJECTIVE   VITALS:  BP (!) 153/94 (Site: Right Upper Arm, Position: Sitting, Cuff Size: Large Adult)   Pulse 93   Ht 1.78 m (5' 10.08\")   Wt 130 kg (286 lb 9.6 oz)   SpO2 96%   BMI 41.03 kg/m²      Physical Exam:  Gen: alert, well appearing, and in no distress, oriented to person, place, and time, and normal appearing weight  Chest:  Sternotomy site well approximated, clean and dry.  No signs of erythema, swelling, or drainage. Chest tube insertion sites clean and dry.  No signs of infection.  Lung: clear to auscultation, no wheezes or rales, and unlabored breathing  Heart: S1 and S2 normal, no murmur, click, gallop or rub  Extremities: peripheral pulses normal, no pedal edema, no clubbing or cyanosis       LLE EVH Site: gold ball size hematoma, possible seroma vs abscess. Darkened skin changes but not bright red erythema- does not appear to be acute cellulitis. No warmth to touch. TTP.    Assessment:     S/p CABGx3 (LIMA-LAD, SVG-OM1, SVG-PDA) on 8/16/2024.         Plan     Continue holding Plavix. Hold ASA today and tomorrow'  Plan for I&D/wash out of LLE incision.  Pre op orders placed.   Will allan need ABX post operatively.  Eddie Zavala MD 12/05/24 8:34 AM

## 2024-12-05 NOTE — CARE COORDINATION
12/05/24 1608   Service Assessment   Patient Orientation Alert and Oriented;Person;Place;Situation   Cognition Alert   History Provided By Patient   Primary Caregiver Self   Support Systems Children;Family Members   Patient's Healthcare Decision Maker is: Legal Next of Kin   PCP Verified by CM Yes   Last Visit to PCP Within last 6 months   Prior Functional Level Independent in ADLs/IADLs   Current Functional Level Assistance with the following:;Bathing;Shopping;Mobility;Housework;Cooking   Can patient return to prior living arrangement Yes   Ability to make needs known: Good   Family able to assist with home care needs: Yes   Would you like for me to discuss the discharge plan with any other family members/significant others, and if so, who? No   Condition of Participation: Discharge Planning   The Patient and/or Patient Representative was provided with a Choice of Provider? Patient   The Patient and/Or Patient Representative agree with the Discharge Plan? Yes     CM into see pt to initiate a safe discharge plan. Cm introduced self and explained role of CM. Pt is kind, alert and oriented. Pt lives at home in multilevel home but able to stay on the first floor. Pt has been independent. Cm informed per PA that pt will need a wound vac. CM called Anthony with [facility] [phone] who is covering for Radha the referral and informed discharge poss tomorrow. Pt Pt has no DME and does not feel a need. Pt has two supportive dtrs. Pt has a PCP and insurance, able to afford his medications. Groceries/ transportation per family as needed. CM discussed home care and pt has used [facility] and would like them to follow. CM contacted [facility] and spoke with Patsy. CM faxed information to [facility].   Pt had no other needs.   CM provided card and encouraged to call for any needs or concern.   CM is available if any needs arise.      Admission Reconciliation is Completed  Discharge Reconciliation is Not Complete Admission Reconciliation is Completed  Discharge Reconciliation is Completed

## 2024-12-05 NOTE — INTERVAL H&P NOTE
Update History & Physical    The patient's History and Physical of December 4, 2024 was reviewed with the patient and I examined the patient. There was no change. The surgical site was confirmed by the patient and me.     Plan: The risks, benefits, expected outcome, and alternative to the recommended procedure have been discussed with the patient. Patient understands and wants to proceed with the procedure.     Electronically signed by Eddie Zavala MD on 12/5/2024 at 8:34 AM

## 2024-12-05 NOTE — H&P (VIEW-ONLY)
Cardiothoracic Surgery   Postoperative Follow Up    Cardiologist:  Dr Scott     Procedure:  CABGx3 (LIMA-LAD, SVG-OM1, SVG-PDA) on 8/16/2024.       HISTORY OF PRESENT ILLNESS       Mark Bourgeois is a 69 y.o. male who presents today for a postoperative follow up appointment. He has developed a gold ball size lump under his incision where bleeding was prior. No current drainage. But he states it is very tender and feels like it will pop open soon. No fevers or chills.      OBJECTIVE   VITALS:  BP (!) 153/94 (Site: Right Upper Arm, Position: Sitting, Cuff Size: Large Adult)   Pulse 93   Ht 1.78 m (5' 10.08\")   Wt 130 kg (286 lb 9.6 oz)   SpO2 96%   BMI 41.03 kg/m²      Physical Exam:  Gen: alert, well appearing, and in no distress, oriented to person, place, and time, and normal appearing weight  Chest:  Sternotomy site well approximated, clean and dry.  No signs of erythema, swelling, or drainage. Chest tube insertion sites clean and dry.  No signs of infection.  Lung: clear to auscultation, no wheezes or rales, and unlabored breathing  Heart: S1 and S2 normal, no murmur, click, gallop or rub  Extremities: peripheral pulses normal, no pedal edema, no clubbing or cyanosis       LLE EVH Site: gold ball size hematoma, possible seroma vs abscess. Darkened skin changes but not bright red erythema- does not appear to be acute cellulitis. No warmth to touch. TTP.    Assessment:     S/p CABGx3 (LIMA-LAD, SVG-OM1, SVG-PDA) on 8/16/2024.         Plan     Continue holding Plavix. Hold ASA today and tomorrow'  Plan for I&D/wash out of LLE incision.  Pre op orders placed.   Will lalan need ABX post operatively.  Eddie Zavala MD 12/05/24 8:34 AM

## 2024-12-05 NOTE — CONSULTS
[facility] Ost[facility] Continence Nurse  Consult Note       Mark Bourgeois  AGE: 69 y.o.   GENDER: male  : 1955  TODAY'S DATE:  2024    Subjective:     Reason for Evaluation and Assessment: wound care eval.      Mark Bourgeois is a 69 y.o. male referred by:   [x] Physician  [] Nursing  [] Other:     Wound Identification:  Wound Type: non-healing surgical  Contributing Factors: edema, obesity, and cad, s/p left leg graft site washout        PAST MEDICAL HISTORY        Diagnosis Date    CAD (coronary artery disease)     CABG    Hyperlipidemia     Hypertension        PAST SURGICAL HISTORY    Past Surgical History:   Procedure Laterality Date    CARDIAC PROCEDURE N/A 2024    Left heart cath / coronary angiography performed by Derek Hernández MD at [facility] CARDIAC CATH LAB    COLONOSCOPY      CORONARY ARTERY BYPASS GRAFT N/A 2024    CORONARY ARTERY BYPASS GRAFT X3; LIMA-LAD; SVG-OM1; SVG- PDA; LEFT ENDOSCOPIC GREATER SAPHENOUS VEIN HARVEST; LINA performed by Eddie Zavala MD at [facility] OR    HERNIA REPAIR      SINUS SURGERY      TONSILLECTOMY      TOTAL KNEE ARTHROPLASTY Right     TYMPANOSTOMY TUBE PLACEMENT Left     UMBILICAL HERNIA REPAIR      VASCULAR SURGERY      graft for open heart    VASECTOMY         FAMILY HISTORY    Family History   Problem Relation Age of Onset    Breast Cancer Mother          58    Heart Failure Father     No Known Problems Sister     No Known Problems Sister     Kidney Disease Daughter         dialysis       SOCIAL HISTORY    Social History     Tobacco Use    Smoking status: Never    Smokeless tobacco: Never   Vaping Use    Vaping status: Never Used   Substance Use Topics    Alcohol use: Not Currently     Comment: A couple times a year. Caffiene: on occasion.    Drug use: No       ALLERGIES    No Known Allergies    MEDICATIONS    No current facility-administered medications on file prior to encounter.     Current Outpatient Medications on File Prior to Encounter  12/05/24 1400   Drainage Description Sanguinous 12/05/24 1400   Odor None 12/05/24 1400   Stephanie-wound Assessment Intact 12/05/24 1400   Margins Defined edges 12/05/24 1400   Wound Thickness Description not for Pressure Injury Full thickness 12/05/24 1400   Number of days: 0       Response to treatment:  With complaints of pain.     Pain Assessment:  Severity:  mild  Quality of pain: sore/painful  Wound Pain Timing/Severity: left leg wound care  Premedicated: no    Plan:     Plan of Care: Wound 12/05/24 Pretibial Left-Dressing/Treatment: Gauze dressing/dressing sponge, ABD, Roll gauze, Ace wrap (fibrillar)    Patient in bed wound care heard vac alarming blockage pt is s/p left leg graft harvest site washout and vac placed. Blockage of blood in tubing and dressing has large blood clot. Gaurang HALL cardiothoracic in the room ok to remove dressing. Removed black sponge x 1. Wound is bleeding held with pressure and fibrillar applied to wound dry gauze abd/kerlix/ace wrap. Wound care to apply wound vac tomorrow. Pt's nurse updated.     Specialty Bed Required : no  [] Low Air Loss   [] Pressure Redistribution  [] Fluid Immersion  [] Bariatric  [] Total Pressure Relief  [] Other:     Discharge Plan:  Placement for patient upon discharge: tbd  Hospice Care: no  Patient appropriate for Outpatient Wound Care Center: tbd    Patient/Caregiver Teaching:  Level of patient/caregiver understanding able to: pt voiced understanding.         Electronically signed by Emily Naylor RN, on 12/5/2024 at 2:26 PM

## 2024-12-05 NOTE — PROGRESS NOTES
1031 Patient arrives to PACU, placed on cardiac monitor, alarms on.  Patient arrives alert.  Respirations unlabored.  Strong, dry cough noted.  Patient with 1 surgical incision; wound vac placed with 1 black sponge.  Serosanguinous drainage noted.  Patient with small dime size blood collection noted at top of sponge/surgical site, under transparent dressing.  1049 Medicated with 50mcg Fentanyl per MAR for pain.  1052 Patient given ice chips per request.  1107 Bloody drainage collection approx size of quarter.  Dr. Zavala to bedside and notified.  No new orders received.  Dr. Zavala increased suction strength to medium.  No other changes in patient condition.  Reports improvement in pain but states still present.  Denies needing additional medication at this time.  1116 Report called to SERA Milner, CVICU.

## 2024-12-05 NOTE — PROGRESS NOTES
1137: pt arrived to CVICU at this time s/p LLE exploration with washout. Pt connected to monitor; wound vac intact and in place. 20g PIV in R hand. Report received from PACU RN Alisha. Vital signs stable. Assessment completed, see documentation. Care plan on going. All needs met at this time.

## 2024-12-05 NOTE — PROGRESS NOTES
1502: RAFAEL Max at bedside at this time; updated on pt's hemodynamic status. Received verbal order with readback to give 50 mg of metoprolol succinate now and modify metoprolol succinate to 200 mg daily, starting tomorrow per RAFAEL Max.

## 2024-12-06 ENCOUNTER — HOSPITAL ENCOUNTER (OUTPATIENT)
Age: 69
Setting detail: OBSERVATION
Discharge: HOME | End: 2024-12-08
Attending: THORACIC SURGERY (CARDIOTHORACIC VASCULAR SURGERY) | Admitting: THORACIC SURGERY (CARDIOTHORACIC VASCULAR SURGERY)
Payer: COMMERCIAL

## 2024-12-06 VITALS
HEIGHT: 70 IN | BODY MASS INDEX: 39.45 KG/M2 | OXYGEN SATURATION: 92 % | WEIGHT: 275.57 LBS | HEART RATE: 70 BPM | SYSTOLIC BLOOD PRESSURE: 117 MMHG | RESPIRATION RATE: 18 BRPM | DIASTOLIC BLOOD PRESSURE: 61 MMHG | TEMPERATURE: 97.7 F

## 2024-12-06 DIAGNOSIS — T81.89XS NONHEALING SURGICAL WOUND, SEQUELA: ICD-10-CM

## 2024-12-06 PROBLEM — T14.8XXA NON-HEALING NON-SURGICAL WOUND: Status: ACTIVE | Noted: 2024-12-06

## 2024-12-06 LAB
ANION GAP SERPL CALCULATED.3IONS-SCNC: 10 MMOL/L (ref 9–17)
BUN SERPL-MCNC: 27 MG/DL (ref 7–20)
CALCIUM SERPL-MCNC: 8.9 MG/DL (ref 8.3–10.6)
CHLORIDE SERPL-SCNC: 104 MMOL/L (ref 99–110)
CO2 SERPL-SCNC: 24 MMOL/L (ref 21–32)
CREAT SERPL-MCNC: 1.1 MG/DL (ref 0.8–1.3)
ERYTHROCYTE [DISTWIDTH] IN BLOOD BY AUTOMATED COUNT: 14.5 % (ref 11.7–14.9)
GFR, ESTIMATED: 70 ML/MIN/1.73M2
GLUCOSE SERPL-MCNC: 133 MG/DL (ref 74–99)
HCT VFR BLD AUTO: 41.3 % (ref 42–52)
HGB BLD-MCNC: 12.7 G/DL (ref 13.5–18)
MCH RBC QN AUTO: 25 PG (ref 27–31)
MCHC RBC AUTO-ENTMCNC: 30.8 G/DL (ref 32–36)
MCV RBC AUTO: 81.1 FL (ref 78–100)
PLATELET # BLD AUTO: 306 K/UL (ref 140–440)
PMV BLD AUTO: 9.7 FL (ref 7.5–11.1)
POTASSIUM SERPL-SCNC: 4.1 MMOL/L (ref 3.5–5.1)
RBC # BLD AUTO: 5.09 M/UL (ref 4.6–6.2)
SODIUM SERPL-SCNC: 138 MMOL/L (ref 136–145)
SURGICAL PATHOLOGY REPORT: NORMAL
WBC OTHER # BLD: 10.1 K/UL (ref 4–10.5)

## 2024-12-06 PROCEDURE — 85027 COMPLETE CBC AUTOMATED: CPT

## 2024-12-06 PROCEDURE — 80048 BASIC METABOLIC PNL TOTAL CA: CPT

## 2024-12-06 PROCEDURE — 6370000000 HC RX 637 (ALT 250 FOR IP): Performed by: PHYSICIAN ASSISTANT

## 2024-12-06 PROCEDURE — G0379 DIRECT REFER HOSPITAL OBSERV: HCPCS

## 2024-12-06 PROCEDURE — G0378 HOSPITAL OBSERVATION PER HR: HCPCS

## 2024-12-06 PROCEDURE — 6360000002 HC RX W HCPCS: Performed by: PHYSICIAN ASSISTANT

## 2024-12-06 PROCEDURE — 94761 N-INVAS EAR/PLS OXIMETRY MLT: CPT

## 2024-12-06 PROCEDURE — 2580000003 HC RX 258: Performed by: PHYSICIAN ASSISTANT

## 2024-12-06 RX ORDER — TRAMADOL HYDROCHLORIDE 50 MG/1
50 TABLET ORAL EVERY 6 HOURS PRN
Qty: 30 TABLET | Refills: 0 | Status: ON HOLD | OUTPATIENT
Start: 2024-12-06 | End: 2024-12-08

## 2024-12-06 RX ORDER — GABAPENTIN 300 MG/1
300 CAPSULE ORAL 3 TIMES DAILY
Qty: 87 CAPSULE | Refills: 0 | Status: SHIPPED | OUTPATIENT
Start: 2024-12-06 | End: 2025-01-04

## 2024-12-06 RX ORDER — ACETAMINOPHEN 325 MG/1
650 TABLET ORAL EVERY 6 HOURS PRN
Status: DISCONTINUED | OUTPATIENT
Start: 2024-12-06 | End: 2024-12-08 | Stop reason: HOSPADM

## 2024-12-06 RX ORDER — ACETAMINOPHEN 500 MG
1000 TABLET ORAL EVERY 6 HOURS PRN
Status: DISCONTINUED | OUTPATIENT
Start: 2024-12-06 | End: 2024-12-08 | Stop reason: HOSPADM

## 2024-12-06 RX ORDER — MAGNESIUM SULFATE IN WATER 40 MG/ML
2000 INJECTION, SOLUTION INTRAVENOUS PRN
Status: DISCONTINUED | OUTPATIENT
Start: 2024-12-06 | End: 2024-12-08 | Stop reason: HOSPADM

## 2024-12-06 RX ORDER — SODIUM CHLORIDE 0.9 % (FLUSH) 0.9 %
5-40 SYRINGE (ML) INJECTION PRN
Status: DISCONTINUED | OUTPATIENT
Start: 2024-12-06 | End: 2024-12-08 | Stop reason: HOSPADM

## 2024-12-06 RX ORDER — METOPROLOL SUCCINATE 50 MG/1
200 TABLET, EXTENDED RELEASE ORAL DAILY
Status: DISCONTINUED | OUTPATIENT
Start: 2024-12-07 | End: 2024-12-08 | Stop reason: HOSPADM

## 2024-12-06 RX ORDER — ACETAMINOPHEN 650 MG/1
650 SUPPOSITORY RECTAL EVERY 6 HOURS PRN
Status: DISCONTINUED | OUTPATIENT
Start: 2024-12-06 | End: 2024-12-08 | Stop reason: HOSPADM

## 2024-12-06 RX ORDER — POTASSIUM CHLORIDE 7.45 MG/ML
10 INJECTION INTRAVENOUS PRN
Status: DISCONTINUED | OUTPATIENT
Start: 2024-12-06 | End: 2024-12-08 | Stop reason: HOSPADM

## 2024-12-06 RX ORDER — POTASSIUM CHLORIDE 1500 MG/1
20 TABLET, EXTENDED RELEASE ORAL 2 TIMES DAILY
Status: DISCONTINUED | OUTPATIENT
Start: 2024-12-06 | End: 2024-12-06

## 2024-12-06 RX ORDER — CLOPIDOGREL BISULFATE 75 MG/1
75 TABLET ORAL DAILY
Status: DISCONTINUED | OUTPATIENT
Start: 2024-12-06 | End: 2024-12-06

## 2024-12-06 RX ORDER — ONDANSETRON 2 MG/ML
4 INJECTION INTRAMUSCULAR; INTRAVENOUS EVERY 6 HOURS PRN
Status: DISCONTINUED | OUTPATIENT
Start: 2024-12-06 | End: 2024-12-08 | Stop reason: HOSPADM

## 2024-12-06 RX ORDER — ATORVASTATIN CALCIUM 40 MG/1
80 TABLET, FILM COATED ORAL DAILY
Status: DISCONTINUED | OUTPATIENT
Start: 2024-12-07 | End: 2024-12-08 | Stop reason: HOSPADM

## 2024-12-06 RX ORDER — SODIUM CHLORIDE 0.9 % (FLUSH) 0.9 %
5-40 SYRINGE (ML) INJECTION EVERY 12 HOURS SCHEDULED
Status: DISCONTINUED | OUTPATIENT
Start: 2024-12-06 | End: 2024-12-08 | Stop reason: HOSPADM

## 2024-12-06 RX ORDER — FAMOTIDINE 20 MG/1
40 TABLET, FILM COATED ORAL 2 TIMES DAILY
Status: DISCONTINUED | OUTPATIENT
Start: 2024-12-06 | End: 2024-12-08 | Stop reason: HOSPADM

## 2024-12-06 RX ORDER — METOPROLOL SUCCINATE 50 MG/1
150 TABLET, EXTENDED RELEASE ORAL DAILY
Status: DISCONTINUED | OUTPATIENT
Start: 2024-12-06 | End: 2024-12-06

## 2024-12-06 RX ORDER — ONDANSETRON 4 MG/1
4 TABLET, ORALLY DISINTEGRATING ORAL EVERY 8 HOURS PRN
Status: DISCONTINUED | OUTPATIENT
Start: 2024-12-06 | End: 2024-12-08 | Stop reason: HOSPADM

## 2024-12-06 RX ORDER — FUROSEMIDE 40 MG/1
40 TABLET ORAL DAILY
Status: DISCONTINUED | OUTPATIENT
Start: 2024-12-06 | End: 2024-12-06

## 2024-12-06 RX ORDER — LACTOBACILLUS RHAMNOSUS GG 10B CELL
1 CAPSULE ORAL
Status: DISCONTINUED | OUTPATIENT
Start: 2024-12-07 | End: 2024-12-08 | Stop reason: HOSPADM

## 2024-12-06 RX ORDER — FUROSEMIDE 40 MG/1
40 TABLET ORAL DAILY
Status: DISCONTINUED | OUTPATIENT
Start: 2024-12-07 | End: 2024-12-08 | Stop reason: HOSPADM

## 2024-12-06 RX ORDER — POTASSIUM CHLORIDE 1500 MG/1
20 TABLET, EXTENDED RELEASE ORAL
Status: DISCONTINUED | OUTPATIENT
Start: 2024-12-07 | End: 2024-12-08 | Stop reason: HOSPADM

## 2024-12-06 RX ORDER — ASPIRIN 81 MG/1
81 TABLET, CHEWABLE ORAL DAILY
Status: DISCONTINUED | OUTPATIENT
Start: 2024-12-06 | End: 2024-12-06

## 2024-12-06 RX ORDER — POTASSIUM CHLORIDE 1500 MG/1
40 TABLET, EXTENDED RELEASE ORAL PRN
Status: DISCONTINUED | OUTPATIENT
Start: 2024-12-06 | End: 2024-12-08 | Stop reason: HOSPADM

## 2024-12-06 RX ORDER — TRAMADOL HYDROCHLORIDE 50 MG/1
50 TABLET ORAL EVERY 6 HOURS PRN
Status: DISCONTINUED | OUTPATIENT
Start: 2024-12-06 | End: 2024-12-08 | Stop reason: HOSPADM

## 2024-12-06 RX ORDER — SODIUM CHLORIDE 9 MG/ML
INJECTION, SOLUTION INTRAVENOUS PRN
Status: DISCONTINUED | OUTPATIENT
Start: 2024-12-06 | End: 2024-12-08 | Stop reason: HOSPADM

## 2024-12-06 RX ORDER — TRAMADOL HYDROCHLORIDE 50 MG/1
50 TABLET ORAL EVERY 6 HOURS PRN
Qty: 30 TABLET | Refills: 0 | Status: SHIPPED | OUTPATIENT
Start: 2024-12-06 | End: 2024-12-06

## 2024-12-06 RX ORDER — LIDOCAINE HYDROCHLORIDE AND EPINEPHRINE 10; 10 MG/ML; UG/ML
20 INJECTION, SOLUTION INFILTRATION; PERINEURAL ONCE
Status: COMPLETED | OUTPATIENT
Start: 2024-12-06 | End: 2024-12-06

## 2024-12-06 RX ORDER — POLYETHYLENE GLYCOL 3350 17 G/17G
17 POWDER, FOR SOLUTION ORAL DAILY PRN
Status: DISCONTINUED | OUTPATIENT
Start: 2024-12-06 | End: 2024-12-08 | Stop reason: HOSPADM

## 2024-12-06 RX ORDER — GABAPENTIN 300 MG/1
300 CAPSULE ORAL 3 TIMES DAILY
Status: DISCONTINUED | OUTPATIENT
Start: 2024-12-06 | End: 2024-12-08 | Stop reason: HOSPADM

## 2024-12-06 RX ORDER — ATORVASTATIN CALCIUM 40 MG/1
80 TABLET, FILM COATED ORAL DAILY
Status: DISCONTINUED | OUTPATIENT
Start: 2024-12-06 | End: 2024-12-06

## 2024-12-06 RX ADMIN — GABAPENTIN 300 MG: 300 CAPSULE ORAL at 08:15

## 2024-12-06 RX ADMIN — ATORVASTATIN CALCIUM 80 MG: 40 TABLET, FILM COATED ORAL at 08:15

## 2024-12-06 RX ADMIN — ACETAMINOPHEN 650 MG: 325 TABLET ORAL at 00:04

## 2024-12-06 RX ADMIN — Medication 1 EACH: at 14:57

## 2024-12-06 RX ADMIN — SODIUM CHLORIDE, PRESERVATIVE FREE 10 ML: 5 INJECTION INTRAVENOUS at 20:32

## 2024-12-06 RX ADMIN — FAMOTIDINE 40 MG: 20 TABLET ORAL at 20:31

## 2024-12-06 RX ADMIN — FUROSEMIDE 40 MG: 20 TABLET ORAL at 08:15

## 2024-12-06 RX ADMIN — METOPROLOL SUCCINATE 200 MG: 50 TABLET, EXTENDED RELEASE ORAL at 08:16

## 2024-12-06 RX ADMIN — ACETAMINOPHEN 650 MG: 325 TABLET ORAL at 08:15

## 2024-12-06 RX ADMIN — TRAMADOL HYDROCHLORIDE 50 MG: 50 TABLET, COATED ORAL at 23:12

## 2024-12-06 RX ADMIN — POTASSIUM CHLORIDE 20 MEQ: 1500 TABLET, EXTENDED RELEASE ORAL at 08:16

## 2024-12-06 RX ADMIN — LIDOCAINE HYDROCHLORIDE,EPINEPHRINE BITARTRATE 20 ML: 10; .01 INJECTION, SOLUTION INFILTRATION; PERINEURAL at 14:48

## 2024-12-06 RX ADMIN — ASPIRIN 81 MG: 81 TABLET, CHEWABLE ORAL at 08:16

## 2024-12-06 RX ADMIN — GABAPENTIN 300 MG: 300 CAPSULE ORAL at 20:31

## 2024-12-06 RX ADMIN — GABAPENTIN 300 MG: 300 CAPSULE ORAL at 14:56

## 2024-12-06 RX ADMIN — FAMOTIDINE 40 MG: 20 TABLET ORAL at 08:15

## 2024-12-06 RX ADMIN — ACETAMINOPHEN 650 MG: 325 TABLET ORAL at 05:19

## 2024-12-06 ASSESSMENT — PAIN DESCRIPTION - LOCATION: LOCATION: LEG

## 2024-12-06 ASSESSMENT — PAIN - FUNCTIONAL ASSESSMENT: PAIN_FUNCTIONAL_ASSESSMENT: ACTIVITIES ARE NOT PREVENTED

## 2024-12-06 ASSESSMENT — PAIN DESCRIPTION - ORIENTATION: ORIENTATION: LEFT;MID

## 2024-12-06 ASSESSMENT — PAIN SCALES - GENERAL
PAINLEVEL_OUTOF10: 0
PAINLEVEL_OUTOF10: 7

## 2024-12-06 ASSESSMENT — PAIN DESCRIPTION - PAIN TYPE: TYPE: ACUTE PAIN

## 2024-12-06 ASSESSMENT — PAIN DESCRIPTION - ONSET: ONSET: SUDDEN

## 2024-12-06 ASSESSMENT — PAIN DESCRIPTION - FREQUENCY: FREQUENCY: INTERMITTENT

## 2024-12-06 ASSESSMENT — PAIN DESCRIPTION - DESCRIPTORS: DESCRIPTORS: SHARP;SHOOTING

## 2024-12-06 NOTE — PROGRESS NOTES
Spoke with LAURIE HALL regarding left leg wound. Does not want NPWT applied at this time. He will change dressing to thigh today and wants pt to go to outpatient wound clinic on Monday. Called [place] outpatient wound clinic and set appointment for Monday 12/9/24 at 0930. Updated discharge instructions and updated pt. Updated nurse and LAURIE Saravia as well.

## 2024-12-06 NOTE — DISCHARGE INSTRUCTIONS
Follow up at [place] outpatient wound clinic on Monday December 9th at 9:30 AM. Call [phone] with issues

## 2024-12-06 NOTE — DISCHARGE SUMMARY
Cardiothoracic and Vascular Surgery Discharge Summary  Today's Date: 24  Name:  Mark BLACK/Age/Sex: 1955  (69 y.o. male)   MRN & CSN:  [id number] & [id number] Admission Date/Time: 2024  6:09 AM   Location:  -A PCP: Hyacinth Garcia MD       Admit date: 2024   Discharge date: 2024      Admitting Provider: Eddie Zavala MD   Discharge Provider: Gaurang Saravia PA-C     Discharge Diagnoses:   Active Hospital Problems    Diagnosis Date Noted    Infection [B99.9] 2024    Abscess of left lower extremity [L02.416] 2024     Discharged Condition: stable.    Operation    S/p s/p LEFT LOWER EXTREMITY EXPLORATION WITH WASHOUT; WOUND VAC PLACEMENT  on 24     Hospital Course   [facility] setup  Wet to dry dressing changes  Wound Care clinic setup for 24 for eval and treatment.    [facility]     [phone]   [address] [facility] [address]         Next Steps: Go on 2024  Instructions: For wound re-check at 9:30 AM            PT/OT evaluated patient and is recommending be discharged to Home      Consults: wound ostomy    PHYSICAL EXAM:    VS at discharge   Vitals:    24 1000   BP: 119/61   Pulse: 66   Resp: 19   Temp:    SpO2: 95%       Physical Exam:     General: A&O x 3, NAD noted  Neck:  supple, no carotid bruits, no JVD  ENT: MIGUEL  Heart: Normal S1, S2, RRR, No murmurs noted   Lungs: Diminished BS bilaterally at the bases.  Abdomen: Soft, non tender, non distended, Hypoactive BS x 4   : no mejía   Extremities: No edema noted bilateral LE.  Neuro: no focal deficits noted        Disposition: home        FOLLOW UP APPOINTMENTS    Future Appointments   Date Time Provider Department Center   2024  9:30 AM Carie Bowman APRN - CNP [facility]   2024 11:00 AM [facility] CARDIAC REHAB EXERCISE [facility] CAR [facility]   12/10/2024 11:00 AM [facility]   2024 11:00 AM

## 2024-12-06 NOTE — PROGRESS NOTES
4 Eyes Skin Assessment     NAME:  Mark Bourgeois  YOB: 1955  MEDICAL RECORD NUMBER:  [id number]    The patient is being assessed for  Admission    I agree that at least one RN has performed a thorough Head to Toe Skin Assessment on the patient. ALL assessment sites listed below have been assessed.      Areas assessed by both nurses:    Head, Face, Ears, Shoulders, Back, Chest, Arms, Elbows, Hands, Sacrum. Buttock, Coccyx, Ischium, and Legs. Feet and Heels        Does the Patient have a Wound? Yes, E [facility] site       Jacek Prevention initiated by RN: No  Wound Care Orders initiated by RN: No    Pressure Injury (Stage 3,4, Unstageable, DTI, NWPT, and Complex wounds) if present, place Wound referral order by RN under : Yes    New Ostomies, if present place, Ostomy referral order under : No     Nurse 1 eSignature: Electronically signed by Arsalan Brooks RN on 12/6/24 at 3:32 PM EST    **SHARE this note so that the co-signing nurse can place an eSignature**    Nurse 2 eSignature: Electronically signed by Mirna Ponce RN on 12/6/24 at 3:43 PM EST

## 2024-12-06 NOTE — H&P
[facility] Cardiothoracic Surgery  History and Physical     Surgeon:  Lucas    Procedure:  s/p LEFT LOWER EXTREMITY EXPLORATION WITH WASHOUT; WOUND VAC PLACEMENT  on 12/6/24    Subjective:    Patient is a 69-year-old male who was discharged earlier from the hospital today following a lower extremity exploration with washout on 12/6/2024.  When patient arrived home, he noticed overt bleeding from his lower extremity incision that went through his dressing and Ace bandage therefore he called the CVICU.  It was decided to bring the patient back to the hospital for admission.  He denies any fever, chills, nausea, vomiting, headache, lightheadedness, dizziness.      Vital Signs: There were no vitals taken for this visit.     Admit Weight:         I/O's:  No intake/output data recorded.    Data:    CBC:   Recent Labs     12/05/24  0730 12/05/24  1155 12/06/24  0530   WBC 6.9 8.0 10.1   HGB 14.2 14.1 12.7*   HCT 45.3 45.7 41.3*   MCV 80.0 81.8 81.1    303 306     BMP:   Recent Labs     12/05/24  0730 12/05/24  1155 12/06/24  0530    141 138   K 3.9 4.3 4.1    105 104   CO2 25 25 24   BUN 23* 22* 27*   CREATININE 1.1 1.1 1.1     PT/INR:   Recent Labs     12/05/24  0730   PROTIME 14.2   INR 1.1     APTT:   Recent Labs     12/05/24 0730   APTT 33.5       Scheduled Meds:    [START ON 12/7/2024] lactobacillus  1 capsule Oral Daily with breakfast    famotidine  40 mg Oral BID    gabapentin  300 mg Oral TID    sodium chloride flush  5-40 mL IntraVENous 2 times per day    [START ON 12/7/2024] furosemide  40 mg Oral Daily    [START ON 12/7/2024] atorvastatin  80 mg Oral Daily    [START ON 12/7/2024] metoprolol succinate  200 mg Oral Daily    [START ON 12/7/2024] potassium chloride  20 mEq Oral Daily with breakfast     Continuous Infusions:    sodium chloride             Physical Exam:    General: A&O x 3, NAD noted  Neck:  supple, no carotid bruits, no JVD  ENT:

## 2024-12-06 NOTE — PROGRESS NOTES
[facility] Cardiothoracic Surgery  Daily Progress Note    Surgeon:  Lucas    Procedure:  s/p LEFT LOWER EXTREMITY EXPLORATION WITH WASHOUT; WOUND VAC PLACEMENT  on 12/6/24    Subjective:     Patient was seen and examined at bedside this morning without any complaints.  Wet to dry dressing changes this morning      Vital Signs: /61   Pulse 66   Temp 97.7 °F (36.5 °C) (Oral)   Resp 19   Ht 1.778 m (5' 10\")   Wt 125 kg (275 lb 9.2 oz)   SpO2 95%   BMI 39.54 kg/m²      Admit Weight: Weight - Scale: 129.7 kg (286 lb)       I/O's:  I/O last 3 completed shifts:  In: 1795 [P.O.:980; I.V.:715; IV Piggyback:100]  Out: 1835 [Urine:1725; Drains:100; Blood:10]    Data:    CBC:   Recent Labs     12/05/24  0730 12/05/24  1155 12/06/24  0530   WBC 6.9 8.0 10.1   HGB 14.2 14.1 12.7*   HCT 45.3 45.7 41.3*   MCV 80.0 81.8 81.1    303 306     BMP:   Recent Labs     12/05/24  0730 12/05/24  1155 12/06/24  0530    141 138   K 3.9 4.3 4.1    105 104   CO2 25 25 24   BUN 23* 22* 27*   CREATININE 1.1 1.1 1.1     PT/INR:   Recent Labs     12/05/24  0730   PROTIME 14.2   INR 1.1     APTT:   Recent Labs     12/05/24  0730   APTT 33.5       Chest X-Ray: [unfilled]     Scheduled Meds:    gabapentin  300 mg Oral TID    acetaminophen  650 mg Oral Q4H    aspirin  81 mg Oral Daily    atorvastatin  80 mg Oral Daily    famotidine  40 mg Oral BID    furosemide  40 mg Oral Daily    potassium chloride  20 mEq Oral BID    metoprolol succinate  200 mg Oral Daily     Continuous Infusions:         Physical Exam:    General: A&O x 3, NAD noted  Neck:  supple, no carotid bruits, no JVD  ENT: MIGUEL  Heart: Normal S1, S2, RRR, No murmurs noted   Lungs: Diminished BS bilaterally at the bases.  Abdomen: Soft, non tender, non distended, Hypoactive BS x 4   : no mejía   Extremities: No edema noted bilateral LE.  Neuro: no focal deficits noted        Assessment:    Mr. Bourgeois is a 69 y.o.

## 2024-12-06 NOTE — PROGRESS NOTES
Pt meets all criteria for discharge at this time.Reviewed in detail all discharge instructions, medications, and follow-up appointments. Stressed importance of follow up with wound care. Phone numbers given to call for any questions. All belongings taken with patient at discharge.

## 2024-12-06 NOTE — PROGRESS NOTES
Patient readmitted to room 2006 due to bleeding of LLE EVH site. Cauterized at bedside by Graciela HALL soon after arrival. Plan to hold pt overnight for observation.

## 2024-12-06 NOTE — ANESTHESIA POSTPROCEDURE EVALUATION
Department of Anesthesiology  Postprocedure Note    Patient: Mark Bourgeois  MRN: [id number]  YOB: 1955  Date of evaluation: 12/6/2024    Procedure Summary       Date: 12/05/24 Room / Location: [address]    Anesthesia Start: 0935 Anesthesia Stop: 1036    Procedure: LEFT LOWER EXTREMITY EXPLORATION WITH WASHOUT; WOUND VAC PLACEMENT (Left) Diagnosis:       Abscess of left lower extremity      (Abscess of left lower extremity [L02.416])    Surgeons: Eddie Zavala MD Responsible Provider: Antony Villalba MD    Anesthesia Type: general ASA Status: 3            Anesthesia Type: No value filed.    Rei Phase I: Rei Score: 10    Rei Phase II:      Anesthesia Post Evaluation    Patient location during evaluation: PACU  Patient participation: complete - patient participated  Level of consciousness: awake  Pain score: 1  Airway patency: patent  Nausea & Vomiting: no nausea and no vomiting  Cardiovascular status: hemodynamically stable  Respiratory status: nasal cannula  Hydration status: euvolemic  Multimodal analgesia pain management approach    No notable events documented.

## 2024-12-06 NOTE — OP NOTE
12/5/2024    PATIENT: Mark Bourgeois    PREOPERATIVE DIAGNOSIS:  Hx of CABG 8/6/2024  Non-healing EVH site    POSTOPERATIVE DIAGNOSIS:  Hx of CABG 8/6/2024  Non-healing EVH site  Foreign body    ASSISTANT:  Mirna Markham SA    OPERATION:  Exploration of left leg wound  Pulsatile irrigation  Removal of foreign body    HISTORY and FINDINGS  Patient had CABG on 8/6/2024.  He had bleeding at the surgical EVH site and this was stitch off at the office.  Hemostasis was achieved and stitch was removed two weeks later.  His Plavix was started a week ago.  He complain of swelling at the wound site.  No history of fever, chills or any other infections signs or symptoms.    Left EVH site below the knee showed good skin healing. There was a.blood clot below the epidermis.  No pus or purulent tissue.    EBL:  20 mL    PQRI measures:  1. Patient received pre-operative beta-blockers.  2. Patient received pre-operative antibiotics.    TECHNIQUE:  The patient was brought in the room and placed in supine position.  After adequate general anesthesia, the left lower extremity was prepped and draped in the usual surgical fashion    An incision was done over the old surgical site.  A 5 mL blood clot was drained.  Exploration of the wound was continue and a foreign body was identified.  This was remove  and sent to pathology for gross examination.  Culture of the wound was obtained.  The wound was explore and there was not purulent tissue or any tissue to be resected.  Pulsatile irrigation was done.  Hemostasis was obtained.  A wound vac sponge was applied 5 X 4 X 2 cm.  Excellent vacuum was obtained.    All instruments and sponge counts were correct.    Patient tolerated well the procedure.    Eddie Zavala MD 12/06/24 8:43 AM

## 2024-12-06 NOTE — CARE COORDINATION
CM received note from [facility]care that pt did not need a woundvac at discharge. CM noted [facility] order dated today. CM faxed [facility] order to [facility] @ [phone].

## 2024-12-06 NOTE — CARE COORDINATION
Herman spoke with Anthony at [facility] and he advises that the wound Vac approval remains pending at this time. Brinda is covering for Radha today 12/6. Brinda can be reached at [phone].

## 2024-12-07 LAB
ANION GAP SERPL CALCULATED.3IONS-SCNC: 9 MMOL/L (ref 9–17)
BUN SERPL-MCNC: 25 MG/DL (ref 7–20)
CALCIUM SERPL-MCNC: 9.1 MG/DL (ref 8.3–10.6)
CHLORIDE SERPL-SCNC: 106 MMOL/L (ref 99–110)
CO2 SERPL-SCNC: 27 MMOL/L (ref 21–32)
CREAT SERPL-MCNC: 1 MG/DL (ref 0.8–1.3)
ERYTHROCYTE [DISTWIDTH] IN BLOOD BY AUTOMATED COUNT: 14.6 % (ref 11.7–14.9)
GFR, ESTIMATED: 73 ML/MIN/1.73M2
GLUCOSE SERPL-MCNC: 89 MG/DL (ref 74–99)
HCT VFR BLD AUTO: 44.2 % (ref 42–52)
HGB BLD-MCNC: 13.4 G/DL (ref 13.5–18)
MCH RBC QN AUTO: 25 PG (ref 27–31)
MCHC RBC AUTO-ENTMCNC: 30.3 G/DL (ref 32–36)
MCV RBC AUTO: 82.5 FL (ref 78–100)
PLATELET # BLD AUTO: 286 K/UL (ref 140–440)
PMV BLD AUTO: 9.9 FL (ref 7.5–11.1)
POTASSIUM SERPL-SCNC: 4 MMOL/L (ref 3.5–5.1)
RBC # BLD AUTO: 5.36 M/UL (ref 4.6–6.2)
SODIUM SERPL-SCNC: 142 MMOL/L (ref 136–145)
WBC OTHER # BLD: 8.2 K/UL (ref 4–10.5)

## 2024-12-07 PROCEDURE — 2580000003 HC RX 258: Performed by: PHYSICIAN ASSISTANT

## 2024-12-07 PROCEDURE — 85027 COMPLETE CBC AUTOMATED: CPT

## 2024-12-07 PROCEDURE — 6370000000 HC RX 637 (ALT 250 FOR IP): Performed by: PHYSICIAN ASSISTANT

## 2024-12-07 PROCEDURE — G0378 HOSPITAL OBSERVATION PER HR: HCPCS

## 2024-12-07 PROCEDURE — 6370000000 HC RX 637 (ALT 250 FOR IP): Performed by: THORACIC SURGERY (CARDIOTHORACIC VASCULAR SURGERY)

## 2024-12-07 PROCEDURE — 94761 N-INVAS EAR/PLS OXIMETRY MLT: CPT

## 2024-12-07 PROCEDURE — 80048 BASIC METABOLIC PNL TOTAL CA: CPT

## 2024-12-07 RX ORDER — ACETAMINOPHEN 500 MG
500 TABLET ORAL NIGHTLY
Status: DISCONTINUED | OUTPATIENT
Start: 2024-12-07 | End: 2024-12-08 | Stop reason: HOSPADM

## 2024-12-07 RX ORDER — DIPHENHYDRAMINE HCL 25 MG
25 TABLET ORAL NIGHTLY
Status: DISCONTINUED | OUTPATIENT
Start: 2024-12-07 | End: 2024-12-08 | Stop reason: HOSPADM

## 2024-12-07 RX ADMIN — Medication 1 CAPSULE: at 09:41

## 2024-12-07 RX ADMIN — Medication 1 EACH: at 08:00

## 2024-12-07 RX ADMIN — FAMOTIDINE 40 MG: 20 TABLET ORAL at 09:41

## 2024-12-07 RX ADMIN — POTASSIUM CHLORIDE 20 MEQ: 1500 TABLET, EXTENDED RELEASE ORAL at 09:42

## 2024-12-07 RX ADMIN — FAMOTIDINE 40 MG: 20 TABLET ORAL at 20:48

## 2024-12-07 RX ADMIN — TRAMADOL HYDROCHLORIDE 50 MG: 50 TABLET, COATED ORAL at 09:44

## 2024-12-07 RX ADMIN — SODIUM CHLORIDE, PRESERVATIVE FREE 10 ML: 5 INJECTION INTRAVENOUS at 20:49

## 2024-12-07 RX ADMIN — GABAPENTIN 300 MG: 300 CAPSULE ORAL at 20:48

## 2024-12-07 RX ADMIN — SODIUM CHLORIDE, PRESERVATIVE FREE 10 ML: 5 INJECTION INTRAVENOUS at 08:00

## 2024-12-07 RX ADMIN — GABAPENTIN 300 MG: 300 CAPSULE ORAL at 14:42

## 2024-12-07 RX ADMIN — ACETAMINOPHEN 1000 MG: 500 TABLET ORAL at 15:07

## 2024-12-07 RX ADMIN — ATORVASTATIN CALCIUM 80 MG: 40 TABLET, FILM COATED ORAL at 09:41

## 2024-12-07 RX ADMIN — ACETAMINOPHEN 500 MG: 500 TABLET ORAL at 20:48

## 2024-12-07 RX ADMIN — METOPROLOL SUCCINATE 200 MG: 50 TABLET, EXTENDED RELEASE ORAL at 09:41

## 2024-12-07 RX ADMIN — GABAPENTIN 300 MG: 300 CAPSULE ORAL at 09:41

## 2024-12-07 RX ADMIN — DIPHENHYDRAMINE HYDROCHLORIDE 25 MG: 25 TABLET ORAL at 20:48

## 2024-12-07 RX ADMIN — FUROSEMIDE 40 MG: 40 TABLET ORAL at 09:41

## 2024-12-07 ASSESSMENT — PAIN DESCRIPTION - DESCRIPTORS
DESCRIPTORS: BURNING
DESCRIPTORS: ACHING
DESCRIPTORS: ACHING;THROBBING
DESCRIPTORS: BURNING

## 2024-12-07 ASSESSMENT — PAIN DESCRIPTION - ONSET
ONSET: ON-GOING

## 2024-12-07 ASSESSMENT — PAIN SCALES - GENERAL
PAINLEVEL_OUTOF10: 4
PAINLEVEL_OUTOF10: 2
PAINLEVEL_OUTOF10: 5
PAINLEVEL_OUTOF10: 0
PAINLEVEL_OUTOF10: 6
PAINLEVEL_OUTOF10: 2
PAINLEVEL_OUTOF10: 0
PAINLEVEL_OUTOF10: 2

## 2024-12-07 ASSESSMENT — PAIN SCALES - WONG BAKER
WONGBAKER_NUMERICALRESPONSE: HURTS A LITTLE BIT
WONGBAKER_NUMERICALRESPONSE: NO HURT
WONGBAKER_NUMERICALRESPONSE: HURTS A LITTLE BIT
WONGBAKER_NUMERICALRESPONSE: NO HURT
WONGBAKER_NUMERICALRESPONSE: HURTS A LITTLE BIT
WONGBAKER_NUMERICALRESPONSE: NO HURT

## 2024-12-07 ASSESSMENT — PAIN DESCRIPTION - LOCATION
LOCATION: LEG

## 2024-12-07 ASSESSMENT — PAIN DESCRIPTION - FREQUENCY
FREQUENCY: CONTINUOUS

## 2024-12-07 ASSESSMENT — PAIN DESCRIPTION - ORIENTATION
ORIENTATION: LEFT;LOWER
ORIENTATION: RIGHT
ORIENTATION: LEFT;LOWER
ORIENTATION: LEFT;LOWER
ORIENTATION: RIGHT
ORIENTATION: LEFT;LOWER

## 2024-12-07 ASSESSMENT — PAIN DESCRIPTION - PAIN TYPE
TYPE: ACUTE PAIN;SURGICAL PAIN

## 2024-12-07 NOTE — PROGRESS NOTES
[facility] Cardiothoracic Surgery      Surgeon:  Lcuas    Procedure:  s/p LEFT LOWER EXTREMITY EXPLORATION WITH WASHOUT; WOUND VAC PLACEMENT  on 12/6/24    Subjective:    Patient seen and examined today while resting in bed. No complaints at this time. No bleeding from EVH site.      Vital Signs: BP (!) 141/76   Pulse 79   Temp 97.7 °F (36.5 °C) (Oral)   Resp 19   SpO2 96%      Admit Weight:         I/O's:  I/O last 3 completed shifts:  In: 200 [P.O.:200]  Out: 400 [Urine:400]    Data:    CBC:   Recent Labs     12/05/24  1155 12/06/24  0530 12/07/24  0430   WBC 8.0 10.1 8.2   HGB 14.1 12.7* 13.4*   HCT 45.7 41.3* 44.2   MCV 81.8 81.1 82.5    306 286     BMP:   Recent Labs     12/05/24  1155 12/06/24  0530 12/07/24  0430    138 142   K 4.3 4.1 4.0    104 106   CO2 25 24 27   BUN 22* 27* 25*   CREATININE 1.1 1.1 1.0     PT/INR:   Recent Labs     12/05/24  0730   PROTIME 14.2   INR 1.1     APTT:   Recent Labs     12/05/24  0730   APTT 33.5       Scheduled Meds:    lactobacillus  1 capsule Oral Daily with breakfast    famotidine  40 mg Oral BID    gabapentin  300 mg Oral TID    sodium chloride flush  5-40 mL IntraVENous 2 times per day    furosemide  40 mg Oral Daily    atorvastatin  80 mg Oral Daily    metoprolol succinate  200 mg Oral Daily    potassium chloride  20 mEq Oral Daily with breakfast     Continuous Infusions:    sodium chloride             Physical Exam:    General: A&O x 3, NAD noted  Neck:  supple, no carotid bruits, no JVD  ENT: MIGUEL  Heart: Normal S1, S2, RRR, No murmurs noted   Lungs: Diminished BS bilaterally at the bases.  Abdomen: Soft, non tender, non distended, Hypoactive BS x 4   : no mejía   Extremities: Left lower extremity with some edema.  Dressing completely saturated.  Upon removing the dressing, noted focal bleeding point within the incision.  No signs of purulence or severe erythema around the incision site.  Neuro: no

## 2024-12-07 NOTE — PROGRESS NOTES
0715: Dr. Levine and RAFAEL Chaudhry at bedside at this time; updated on pt's hemodynamic status. Received verbal order with readback for two silver nitrate Q-tips per Dr. Levine.

## 2024-12-07 NOTE — PROGRESS NOTES
0800: silver nitrate applied to wound by RAFAEL Chaudhry. Wound packed with dry gauze; LLE wrapped with kerlix and secured by silk cloth tape. Received verbal order for two hour bedrest per RAFAEL Chaudhry.

## 2024-12-07 NOTE — PLAN OF CARE
Problem: Safety - Adult  Goal: Free from fall injury  Outcome: Progressing     Problem: Pain  Goal: Verbalizes/displays adequate comfort level or baseline comfort level  Outcome: Progressing  Flowsheets  Taken 12/7/2024 1100  Verbalizes/displays adequate comfort level or baseline comfort level: Assess pain using appropriate pain scale  Taken 12/7/2024 0800  Verbalizes/displays adequate comfort level or baseline comfort level: Assess pain using appropriate pain scale     Problem: ABCDS Injury Assessment  Goal: Absence of physical injury  Outcome: Progressing

## 2024-12-07 NOTE — PROGRESS NOTES
0830: received verbal order with readback for two Q-tips of silver nitrate for tomorrow, 12/08/2024 per RAFAEL Chaudhry.

## 2024-12-08 VITALS
BODY MASS INDEX: 40.34 KG/M2 | HEART RATE: 76 BPM | HEIGHT: 70 IN | WEIGHT: 281.8 LBS | DIASTOLIC BLOOD PRESSURE: 76 MMHG | OXYGEN SATURATION: 94 % | SYSTOLIC BLOOD PRESSURE: 135 MMHG | RESPIRATION RATE: 15 BRPM | TEMPERATURE: 97.8 F

## 2024-12-08 LAB
ANION GAP SERPL CALCULATED.3IONS-SCNC: 9 MMOL/L (ref 9–17)
BUN SERPL-MCNC: 24 MG/DL (ref 7–20)
CALCIUM SERPL-MCNC: 8 MG/DL (ref 8.3–10.6)
CHLORIDE SERPL-SCNC: 105 MMOL/L (ref 99–110)
CO2 SERPL-SCNC: 26 MMOL/L (ref 21–32)
CREAT SERPL-MCNC: 1 MG/DL (ref 0.8–1.3)
ERYTHROCYTE [DISTWIDTH] IN BLOOD BY AUTOMATED COUNT: 14.6 % (ref 11.7–14.9)
GFR, ESTIMATED: 77 ML/MIN/1.73M2
GLUCOSE SERPL-MCNC: 104 MG/DL (ref 74–99)
HCT VFR BLD AUTO: 39.7 % (ref 42–52)
HGB BLD-MCNC: 12.1 G/DL (ref 13.5–18)
MCH RBC QN AUTO: 25.1 PG (ref 27–31)
MCHC RBC AUTO-ENTMCNC: 30.5 G/DL (ref 32–36)
MCV RBC AUTO: 82.4 FL (ref 78–100)
MICROORGANISM SPEC CULT: ABNORMAL
MICROORGANISM SPEC CULT: ABNORMAL
MICROORGANISM SPEC CULT: NORMAL
MICROORGANISM/AGENT SPEC: ABNORMAL
MICROORGANISM/AGENT SPEC: NORMAL
PLATELET # BLD AUTO: 259 K/UL (ref 140–440)
PMV BLD AUTO: 9.7 FL (ref 7.5–11.1)
POTASSIUM SERPL-SCNC: 3.9 MMOL/L (ref 3.5–5.1)
RBC # BLD AUTO: 4.82 M/UL (ref 4.6–6.2)
SERVICE CMNT-IMP: ABNORMAL
SERVICE CMNT-IMP: NORMAL
SODIUM SERPL-SCNC: 140 MMOL/L (ref 136–145)
SPECIMEN DESCRIPTION: ABNORMAL
SPECIMEN DESCRIPTION: NORMAL
WBC OTHER # BLD: 6.4 K/UL (ref 4–10.5)

## 2024-12-08 PROCEDURE — 80048 BASIC METABOLIC PNL TOTAL CA: CPT

## 2024-12-08 PROCEDURE — 94761 N-INVAS EAR/PLS OXIMETRY MLT: CPT

## 2024-12-08 PROCEDURE — 85027 COMPLETE CBC AUTOMATED: CPT

## 2024-12-08 PROCEDURE — 6370000000 HC RX 637 (ALT 250 FOR IP): Performed by: PHYSICIAN ASSISTANT

## 2024-12-08 PROCEDURE — 36415 COLL VENOUS BLD VENIPUNCTURE: CPT

## 2024-12-08 PROCEDURE — G0378 HOSPITAL OBSERVATION PER HR: HCPCS

## 2024-12-08 PROCEDURE — 2580000003 HC RX 258: Performed by: PHYSICIAN ASSISTANT

## 2024-12-08 RX ORDER — TRAMADOL HYDROCHLORIDE 50 MG/1
50 TABLET ORAL ONCE
Status: COMPLETED | OUTPATIENT
Start: 2024-12-08 | End: 2024-12-08

## 2024-12-08 RX ORDER — TRAMADOL HYDROCHLORIDE 50 MG/1
50 TABLET ORAL EVERY 6 HOURS PRN
Qty: 30 TABLET | Refills: 0 | Status: SHIPPED | OUTPATIENT
Start: 2024-12-08 | End: 2025-01-03

## 2024-12-08 RX ORDER — METOPROLOL SUCCINATE 200 MG/1
200 TABLET, EXTENDED RELEASE ORAL DAILY
Qty: 30 TABLET | Refills: 3 | Status: SHIPPED | OUTPATIENT
Start: 2024-12-09

## 2024-12-08 RX ADMIN — TRAMADOL HYDROCHLORIDE 50 MG: 50 TABLET, COATED ORAL at 12:56

## 2024-12-08 RX ADMIN — SODIUM CHLORIDE, PRESERVATIVE FREE 10 ML: 5 INJECTION INTRAVENOUS at 07:43

## 2024-12-08 RX ADMIN — TRAMADOL HYDROCHLORIDE 50 MG: 50 TABLET, COATED ORAL at 07:36

## 2024-12-08 RX ADMIN — POTASSIUM CHLORIDE 20 MEQ: 1500 TABLET, EXTENDED RELEASE ORAL at 07:36

## 2024-12-08 RX ADMIN — Medication 1 CAPSULE: at 07:36

## 2024-12-08 RX ADMIN — GABAPENTIN 300 MG: 300 CAPSULE ORAL at 13:56

## 2024-12-08 RX ADMIN — ATORVASTATIN CALCIUM 80 MG: 40 TABLET, FILM COATED ORAL at 07:42

## 2024-12-08 RX ADMIN — Medication 1 EACH: at 07:43

## 2024-12-08 RX ADMIN — GABAPENTIN 300 MG: 300 CAPSULE ORAL at 07:37

## 2024-12-08 RX ADMIN — Medication 1 EACH: at 07:42

## 2024-12-08 RX ADMIN — FUROSEMIDE 40 MG: 40 TABLET ORAL at 07:37

## 2024-12-08 RX ADMIN — FAMOTIDINE 40 MG: 20 TABLET ORAL at 07:36

## 2024-12-08 RX ADMIN — METOPROLOL SUCCINATE 200 MG: 50 TABLET, EXTENDED RELEASE ORAL at 07:36

## 2024-12-08 ASSESSMENT — PAIN SCALES - GENERAL
PAINLEVEL_OUTOF10: 2
PAINLEVEL_OUTOF10: 0
PAINLEVEL_OUTOF10: 6
PAINLEVEL_OUTOF10: 2
PAINLEVEL_OUTOF10: 4
PAINLEVEL_OUTOF10: 4

## 2024-12-08 ASSESSMENT — PAIN DESCRIPTION - FREQUENCY
FREQUENCY: CONTINUOUS

## 2024-12-08 ASSESSMENT — PAIN DESCRIPTION - DESCRIPTORS
DESCRIPTORS: BURNING
DESCRIPTORS: ACHING
DESCRIPTORS: BURNING

## 2024-12-08 ASSESSMENT — PAIN DESCRIPTION - PAIN TYPE
TYPE: ACUTE PAIN;SURGICAL PAIN

## 2024-12-08 ASSESSMENT — PAIN DESCRIPTION - ONSET
ONSET: ON-GOING

## 2024-12-08 ASSESSMENT — PAIN DESCRIPTION - ORIENTATION
ORIENTATION: LEFT;LOWER
ORIENTATION: LEFT
ORIENTATION: LEFT;LOWER
ORIENTATION: LEFT
ORIENTATION: LEFT;LOWER

## 2024-12-08 ASSESSMENT — PAIN DESCRIPTION - LOCATION
LOCATION: LEG

## 2024-12-08 ASSESSMENT — PAIN SCALES - WONG BAKER
WONGBAKER_NUMERICALRESPONSE: NO HURT
WONGBAKER_NUMERICALRESPONSE: NO HURT
WONGBAKER_NUMERICALRESPONSE: HURTS LITTLE MORE
WONGBAKER_NUMERICALRESPONSE: NO HURT
WONGBAKER_NUMERICALRESPONSE: HURTS A LITTLE BIT
WONGBAKER_NUMERICALRESPONSE: HURTS EVEN MORE
WONGBAKER_NUMERICALRESPONSE: HURTS A LITTLE BIT
WONGBAKER_NUMERICALRESPONSE: NO HURT
WONGBAKER_NUMERICALRESPONSE: HURTS LITTLE MORE

## 2024-12-08 ASSESSMENT — PAIN - FUNCTIONAL ASSESSMENT
PAIN_FUNCTIONAL_ASSESSMENT: ACTIVITIES ARE NOT PREVENTED

## 2024-12-08 NOTE — PLAN OF CARE
Problem: Safety - Adult  Goal: Free from fall injury  12/7/2024 2339 by Annamaria Griffin RN  Outcome: Progressing  12/7/2024 1229 by Annia Chavira RN  Outcome: Progressing     Problem: Pain  Goal: Verbalizes/displays adequate comfort level or baseline comfort level  12/7/2024 2339 by Annamaria Griffin RN  Outcome: Progressing  Flowsheets (Taken 12/7/2024 1600 by Annia Chavira RN)  Verbalizes/displays adequate comfort level or baseline comfort level: Assess pain using appropriate pain scale  12/7/2024 1229 by Annia Chavira RN  Outcome: Progressing  Flowsheets  Taken 12/7/2024 1100  Verbalizes/displays adequate comfort level or baseline comfort level: Assess pain using appropriate pain scale  Taken 12/7/2024 0800  Verbalizes/displays adequate comfort level or baseline comfort level: Assess pain using appropriate pain scale     Problem: ABCDS Injury Assessment  Goal: Absence of physical injury  12/7/2024 2339 by Annamaria Griffin RN  Outcome: Progressing  12/7/2024 1229 by Annia Chavira RN  Outcome: Progressing

## 2024-12-08 NOTE — PROGRESS NOTES
[facility] Cardiothoracic Surgery      Surgeon:  Lucas    Procedure:  s/p LEFT LOWER EXTREMITY EXPLORATION WITH WASHOUT; WOUND VAC PLACEMENT  on 12/6/24    Subjective:    Patient seen and examined today while resting in bed. No complaints at this time. No bleeding from EVH site.      Vital Signs: /80   Pulse 67   Temp 97.9 °F (36.6 °C) (Skin)   Resp 13   Ht 1.778 m (5' 10\")   Wt 127.8 kg (281 lb 12.8 oz)   SpO2 96%   BMI 40.43 kg/m²  O2 Flow Rate (L/min): 1 L/min   Admit Weight: Weight - Scale: 127.5 kg (281 lb)       I/O's:  I/O last 3 completed shifts:  In: 1040 [P.O.:1040]  Out: 1750 [Urine:1750]    Data:    CBC:   Recent Labs     12/06/24  0530 12/07/24  0430 12/08/24  0410   WBC 10.1 8.2 6.4   HGB 12.7* 13.4* 12.1*   HCT 41.3* 44.2 39.7*   MCV 81.1 82.5 82.4    286 259     BMP:   Recent Labs     12/06/24  0530 12/07/24  0430 12/08/24  0410    142 140   K 4.1 4.0 3.9    106 105   CO2 24 27 26   BUN 27* 25* 24*   CREATININE 1.1 1.0 1.0     PT/INR:   No results for input(s): \"PROTIME\", \"INR\" in the last 72 hours.    APTT:   No results for input(s): \"APTT\" in the last 72 hours.      Scheduled Meds:    silver nitrate applicators  1 each Topical Once    silver nitrate applicators  1 each Topical Once    acetaminophen  500 mg Oral Nightly    And    diphenhydrAMINE  25 mg Oral Nightly    lactobacillus  1 capsule Oral Daily with breakfast    famotidine  40 mg Oral BID    gabapentin  300 mg Oral TID    sodium chloride flush  5-40 mL IntraVENous 2 times per day    furosemide  40 mg Oral Daily    atorvastatin  80 mg Oral Daily    metoprolol succinate  200 mg Oral Daily    potassium chloride  20 mEq Oral Daily with breakfast     Continuous Infusions:    sodium chloride             Physical Exam:    General: A&O x 3, NAD noted  Neck:  supple, no carotid bruits, no JVD  ENT: MIGUEL  Heart: Normal S1, S2, RRR, No murmurs noted   Lungs: Diminished BS

## 2024-12-08 NOTE — PLAN OF CARE
Problem: Safety - Adult  Goal: Free from fall injury  12/8/2024 0825 by Annia Chavira RN  Outcome: Progressing  12/7/2024 2339 by Annamaria Griffin RN  Outcome: Progressing     Problem: Pain  Goal: Verbalizes/displays adequate comfort level or baseline comfort level  12/8/2024 0825 by Annia Chavira RN  Outcome: Progressing  Flowsheets (Taken 12/8/2024 0736)  Verbalizes/displays adequate comfort level or baseline comfort level: Assess pain using appropriate pain scale  12/7/2024 2339 by Annamaria Griffin RN  Outcome: Progressing  Flowsheets (Taken 12/7/2024 1600 by Annia Chavira RN)  Verbalizes/displays adequate comfort level or baseline comfort level: Assess pain using appropriate pain scale     Problem: ABCDS Injury Assessment  Goal: Absence of physical injury  12/8/2024 0825 by Annia Chavira RN  Outcome: Progressing  12/7/2024 2339 by Annamaria Griffin RN  Outcome: Progressing

## 2024-12-08 NOTE — PROGRESS NOTES
1440: this RN educated pt on discharge instructions by utilizing the teach-back method. Pt instructed to weigh themselves daily; preferably at the same time each day, after voiding, and before eating. This RN emphasized the importance of medication compliance; we reviewed medication modifications. Pt informed to restart aspirin and continue to hold plavix per RAFAEL Chaudhry. Pt instructed to assess HR and BP prior to taking medications. This RN informed pt to hold blood pressure medication and notify physician, if HR is less than 60 bpm or SBP is less than 100 mmHg. This RN educated to notify physician if bleeding reoccurs or if any of the following s/s of infection arise, such as: redness, swelling, warmth-to-touch or painful-to-touch, purulent drainage, bleeding at site, odor, a temperature of 101 degrees fahrenheit or greater. This RN reiterated the importance of attending follow-up appointment that's scheduled tomorrow with [facility]; address and phone number provided. All questions and concerns addressed at this time. This RN provided CVICU Charge RN phone number for any further questions.

## 2024-12-08 NOTE — PROGRESS NOTES
1246: pt has c/o burning sensation at [facility] site. Pt rating pain 6 out of 10, but tramadol cannot be administered until 1333. Received telephone order with readback to give 50 mg of tramadol PO now per RAFAEL Chaudhry.

## 2024-12-08 NOTE — DISCHARGE INSTRUCTIONS
Please attend follow-up appointment at [facility] [place] tomorrow, 12/09/2024.     [facility] [place] [address]  [phone]

## 2024-12-08 NOTE — PROGRESS NOTES
1955: pt states \"I take tylenol PM at bedtime and last night I couldn't fall asleep.\" Pt requesting sleep aid; RAFAEL Chaudhry notified.    1956: [facility] doesn't not carry tylenol PM, the equivalent is 500 mg of tylenol with 25 mg of benadryl per John, RHP. RAFAEL Chaudhry notified; received telephone order with readback to give 500 mg of tylenol and 25 mg of benadyl nightly per RAFAEL Chaudhry.

## 2024-12-08 NOTE — PROGRESS NOTES
1553: PIVs removed; dry gauze secured by silk cloth tape. No complications; pt tolerated well. All pt belongings provided to pt. Pt discharged from CVICU at this time.

## 2024-12-08 NOTE — PROGRESS NOTES
0800: RAFAEL Chaudhry at bedside at this time; updated on pt's hemodynamic status. Received verbal order with readback to ambulate patient and assess ability to climb stairs; if there's no bleeding and pt tolerates, may move forward with discharge planning per RAFAEL Chaudhry.

## 2024-12-09 ENCOUNTER — TELEPHONE (OUTPATIENT)
Dept: FAMILY MEDICINE CLINIC | Age: 69
End: 2024-12-09

## 2024-12-09 ENCOUNTER — HOSPITAL ENCOUNTER (OUTPATIENT)
Dept: WOUND CARE | Age: 69
Discharge: HOME | End: 2024-12-09
Attending: NURSE PRACTITIONER
Payer: COMMERCIAL

## 2024-12-09 VITALS
SYSTOLIC BLOOD PRESSURE: 150 MMHG | DIASTOLIC BLOOD PRESSURE: 83 MMHG | TEMPERATURE: 98.6 F | RESPIRATION RATE: 16 BRPM | HEART RATE: 83 BPM

## 2024-12-09 DIAGNOSIS — T81.89XS NONHEALING SURGICAL WOUND, SEQUELA: ICD-10-CM

## 2024-12-09 DIAGNOSIS — L97.922 SKIN ULCER OF LEFT LOWER LEG WITH FAT LAYER EXPOSED (HCC): Primary | ICD-10-CM

## 2024-12-09 PROCEDURE — 11042 DBRDMT SUBQ TIS 1ST 20SQCM/<: CPT

## 2024-12-09 PROCEDURE — 11042 DBRDMT SUBQ TIS 1ST 20SQCM/<: CPT | Performed by: NURSE PRACTITIONER

## 2024-12-09 ASSESSMENT — PAIN DESCRIPTION - DESCRIPTORS: DESCRIPTORS: ACHING

## 2024-12-09 ASSESSMENT — PAIN SCALES - GENERAL: PAINLEVEL_OUTOF10: 7

## 2024-12-09 ASSESSMENT — PAIN DESCRIPTION - ORIENTATION: ORIENTATION: LEFT

## 2024-12-09 ASSESSMENT — PAIN DESCRIPTION - LOCATION: LOCATION: LEG

## 2024-12-09 NOTE — PLAN OF CARE
Problem: Pain  Goal: Verbalizes/displays adequate comfort level or baseline comfort level  12/9/2024 1100 by Daljit Isabel RN  Outcome: Progressing  12/9/2024 1100 by Daljit Isabel RN  Outcome: Progressing     Problem: Wound:  Goal: Will show signs of wound healing; wound closure and no evidence of infection  Description: Will show signs of wound healing; wound closure and no evidence of infection  Outcome: Progressing

## 2024-12-09 NOTE — PATIENT INSTRUCTIONS
PHYSICIAN ORDERS AND DISCHARGE INSTRUCTIONS     Wound cleansing:               Do not scrub or use excessive force.              Wash hands with soap and water before and after dressing changes.              Prior to applying a clean dressing, cleanse wound with normal saline,               wound cleanser, or mild soap and water.               Ask the physician or nurse before getting the wound(s) wet in a shower                      Wound Care Notes:  Cardiology: Dr Scott   S/P CABG                                 Orders for this week:  2024              FAX TO [facility]     Left Lower Leg :Wash with soap and water. Pat dry.   Bolster with steristrips  Pack with mesalt and stimulen powder   Cover with agile and tegaderm secured with mastisol  Wrap with ace   Change: Leave in place for 1 week      Dispense 30 day quantity when ordering supplies.  Plan:          Follow Up Instructions: Nurse visit Thursday. 1 week   Primary Wound Care Provider: Carie Bowman CNP   Call  for any questions or concerns.  Central [phone] for imaging and lab work

## 2024-12-10 ENCOUNTER — OFFICE VISIT (OUTPATIENT)
Dept: CARDIOTHORACIC SURGERY | Age: 69
End: 2024-12-10

## 2024-12-10 ENCOUNTER — HOSPITAL ENCOUNTER (OUTPATIENT)
Dept: WOUND CARE | Age: 69
Discharge: HOME | End: 2024-12-10
Attending: NURSE PRACTITIONER
Payer: COMMERCIAL

## 2024-12-10 VITALS — TEMPERATURE: 97.7 F | SYSTOLIC BLOOD PRESSURE: 171 MMHG | DIASTOLIC BLOOD PRESSURE: 90 MMHG | HEART RATE: 81 BPM

## 2024-12-10 VITALS
DIASTOLIC BLOOD PRESSURE: 84 MMHG | HEIGHT: 70 IN | WEIGHT: 290.13 LBS | HEART RATE: 80 BPM | BODY MASS INDEX: 41.54 KG/M2 | OXYGEN SATURATION: 97 % | SYSTOLIC BLOOD PRESSURE: 140 MMHG

## 2024-12-10 DIAGNOSIS — Z09 POSTOPERATIVE FOLLOW-UP: Primary | ICD-10-CM

## 2024-12-10 LAB
MICROORGANISM SPEC CULT: NORMAL
MICROORGANISM/AGENT SPEC: NORMAL
SERVICE CMNT-IMP: NORMAL
SPECIMEN DESCRIPTION: NORMAL

## 2024-12-10 PROCEDURE — 99024 POSTOP FOLLOW-UP VISIT: CPT | Performed by: THORACIC SURGERY (CARDIOTHORACIC VASCULAR SURGERY)

## 2024-12-10 PROCEDURE — 99213 OFFICE O/P EST LOW 20 MIN: CPT

## 2024-12-10 NOTE — PATIENT INSTRUCTIONS
PHYSICIAN ORDERS AND DISCHARGE INSTRUCTIONS     Wound cleansing:               Do not scrub or use excessive force.              Wash hands with soap and water before and after dressing changes.              Prior to applying a clean dressing, cleanse wound with normal saline,               wound cleanser, or mild soap and water.               Ask the physician or nurse before getting the wound(s) wet in a shower                      Wound Care Notes:  Cardiology: Dr Scott   S/P CABG                                 Orders for this week:  12/10/2024              FAX TO [facility]     Left Lower Leg :Wash with soap and water. Pat dry.   Bolster with steristrips  Pack with mesalt and stimulen powder   Cover with agile and tegaderm secured with mastisol  Wrap with ace   Change: changed on 12/10/24 at [facility]      Dispense 30 day quantity when ordering supplies.  Plan:          Follow Up Instructions: Carie chowdhury  on 24. 1 week   Primary Wound Care Provider: Carie Bowman CNP   Call  for any questions or concerns.  Central [phone] for imaging and lab work

## 2024-12-11 ENCOUNTER — HOSPITAL ENCOUNTER (OUTPATIENT)
Dept: WOUND CARE | Age: 69
Discharge: HOME | End: 2024-12-11
Attending: NURSE PRACTITIONER
Payer: COMMERCIAL

## 2024-12-11 VITALS
HEART RATE: 82 BPM | SYSTOLIC BLOOD PRESSURE: 100 MMHG | RESPIRATION RATE: 16 BRPM | DIASTOLIC BLOOD PRESSURE: 84 MMHG | TEMPERATURE: 97.6 F

## 2024-12-11 PROCEDURE — 99213 OFFICE O/P EST LOW 20 MIN: CPT

## 2024-12-11 NOTE — PATIENT INSTRUCTIONS
PHYSICIAN ORDERS AND DISCHARGE INSTRUCTIONS     Wound cleansing:               Do not scrub or use excessive force.              Wash hands with soap and water before and after dressing changes.              Prior to applying a clean dressing, cleanse wound with normal saline,               wound cleanser, or mild soap and water.               Ask the physician or nurse before getting the wound(s) wet in a shower                      Wound Care Notes:  Cardiology: Dr Scott   S/P CABG                                 Orders for this week:  2024              FAX TO [facility]     Left Lower Leg :Wash with soap and water. Pat dry.   Bolster with steristrips  Stijmulen powder to wound bed  Pack with liquid silver nitrate damp gauze    Cover with agile and tegaderm secured with mastisol  Wrap with ace   Change: Leavbe in place until next visit to wound care center      Dispense 30 day quantity when ordering supplies.  Plan:          Follow Up Instructions: . 1 week Monday   Primary Wound Care Provider: Carie Bowman CNP   Call  for any questions or concerns.  Central [phone] for imaging and lab work

## 2024-12-12 ENCOUNTER — HOSPITAL ENCOUNTER (OUTPATIENT)
Dept: WOUND CARE | Age: 69
Discharge: HOME | End: 2024-12-12
Attending: NURSE PRACTITIONER
Payer: COMMERCIAL

## 2024-12-12 VITALS
RESPIRATION RATE: 16 BRPM | SYSTOLIC BLOOD PRESSURE: 134 MMHG | HEART RATE: 75 BPM | TEMPERATURE: 97.8 F | DIASTOLIC BLOOD PRESSURE: 77 MMHG

## 2024-12-12 PROCEDURE — 99213 OFFICE O/P EST LOW 20 MIN: CPT

## 2024-12-12 NOTE — PATIENT INSTRUCTIONS
PHYSICIAN ORDERS AND DISCHARGE INSTRUCTIONS     Wound cleansing:               Do not scrub or use excessive force.              Wash hands with soap and water before and after dressing changes.              Prior to applying a clean dressing, cleanse wound with normal saline,               wound cleanser, or mild soap and water.               Ask the physician or nurse before getting the wound(s) wet in a shower                      Wound Care Notes:  Cardiology: Dr Scott   S/P CABG                                 Orders for this week:  2024              FAX TO [facility]     Left Lower Leg :Wash with soap and water. Pat dry.   Bolster with steristrips  Stijmulen powder to wound bed  Pack with liquid silver nitrate damp gauze    Cover with agile and tegaderm secured with mastisol  Wrap with ace   Change: Leave in place until next visit to wound care center      Dispense 30 day quantity when ordering supplies.  Plan:         Nurse Visit Friday    Follow Up Instructions: . 1 week   Primary Wound Care Provider: Carie Bowman CNP   Call  for any questions or concerns.  Central [phone] for imaging and lab work

## 2024-12-12 NOTE — PROGRESS NOTES
Cardiothoracic Surgery   Postoperative Follow Up    Cardiologist:  Dr Scott     Procedure:  CABGx3 (LIMA-LAD, SVG-OM1, SVG-PDA) on 8/16/2024.   S/p LLE I&D, exploration with washout on 12/6/24      HISTORY OF PRESENT ILLNESS       Mark Bourgeois is a 69 y.o. male who presents today for a postoperative follow up appointment. He saw the wound care yesterday and dressing is still in place. There is a moderate amount of sanguinous drainage noted under the tegaderm. Otherwise no changes.      OBJECTIVE   VITALS:  BP (!) 140/84 (Site: Left Upper Arm, Position: Sitting, Cuff Size: Large Adult)   Pulse 80   Ht 1.778 m (5' 10\")   Wt 131.6 kg (290 lb 2 oz)   SpO2 97%   BMI 41.63 kg/m²      Physical Exam:  Gen: alert, well appearing, and in no distress, oriented to person, place, and time, and normal appearing weight  Chest:  Sternotomy site well approximated, clean and dry.  No signs of erythema, swelling, or drainage. Chest tube insertion sites clean and dry.  No signs of infection.  Lung: clear to auscultation, no wheezes or rales, and unlabored breathing  Heart: S1 and S2 normal, no murmur, click, gallop or rub  Extremities: peripheral pulses normal, no pedal edema, no clubbing or cyanosis       LLE EVH Site: dressing in place with sanguinous drainage noted. ACE bandage in place.    Assessment:     S/p CABGx3 (LIMA-LAD, SVG-OM1, SVG-PDA) on 8/16/2024.   S/p LLE I&D, exploration with washout on 12/6/24      Plan     Follow up with wound clinic as scheduled.   ACE bandage replaced today  RTO 1 week if no changes.      Marisol Hinkle PA-C 12/12/24 11:51 AM

## 2024-12-13 ENCOUNTER — HOSPITAL ENCOUNTER (OUTPATIENT)
Dept: WOUND CARE | Age: 69
Discharge: HOME | End: 2024-12-13
Attending: NURSE PRACTITIONER
Payer: COMMERCIAL

## 2024-12-13 VITALS
SYSTOLIC BLOOD PRESSURE: 149 MMHG | TEMPERATURE: 97.2 F | DIASTOLIC BLOOD PRESSURE: 85 MMHG | RESPIRATION RATE: 16 BRPM | HEART RATE: 79 BPM

## 2024-12-13 PROCEDURE — 17250 CHEM CAUT OF GRANLTJ TISSUE: CPT

## 2024-12-13 ASSESSMENT — PAIN DESCRIPTION - LOCATION: LOCATION: LEG

## 2024-12-13 ASSESSMENT — PAIN - FUNCTIONAL ASSESSMENT: PAIN_FUNCTIONAL_ASSESSMENT: ACTIVITIES ARE NOT PREVENTED

## 2024-12-13 ASSESSMENT — PAIN DESCRIPTION - ORIENTATION: ORIENTATION: LEFT

## 2024-12-13 ASSESSMENT — PAIN SCALES - GENERAL: PAINLEVEL_OUTOF10: 2

## 2024-12-13 ASSESSMENT — PAIN DESCRIPTION - DESCRIPTORS: DESCRIPTORS: BURNING

## 2024-12-13 ASSESSMENT — PAIN DESCRIPTION - ONSET: ONSET: ON-GOING

## 2024-12-13 ASSESSMENT — PAIN DESCRIPTION - FREQUENCY: FREQUENCY: CONTINUOUS

## 2024-12-13 ASSESSMENT — PAIN DESCRIPTION - PAIN TYPE: TYPE: ACUTE PAIN;SURGICAL PAIN

## 2024-12-13 NOTE — PROGRESS NOTES
Cardiothoracic Surgery   Postoperative Follow Up    Cardiologist:  Dr Scott     Procedure:  CABGx3 (LIMA-LAD, SVG-OM1, SVG-PDA) on 8/16/2024.   S/p LLE I&D, exploration with washout on 12/6/24      HISTORY OF PRESENT ILLNESS       Mark Bourgeois is a 69 y.o. male who presents today for a postoperative follow up appointment. He continues to follow with wound care- no antibiotics at this time. Wound is packed per wound center and ACE bandage remains in place.       OBJECTIVE   VITALS:  /76 (Site: Left Upper Arm, Position: Sitting, Cuff Size: Large Adult)   Pulse 79   Ht 1.778 m (5' 10\")   Wt 132 kg (291 lb)   SpO2 96%   BMI 41.75 kg/m²      Physical Exam:  Gen: alert, well appearing, and in no distress, oriented to person, place, and time, and normal appearing weight  Chest:  Sternotomy site well approximated, clean and dry.  No signs of erythema, swelling, or drainage. Chest tube insertion sites clean and dry.  No signs of infection.  Lung: clear to auscultation, no wheezes or rales, and unlabored breathing  Heart: S1 and S2 normal, no murmur, click, gallop or rub  Extremities: peripheral pulses normal, no pedal edema, no clubbing or cyanosis       [facility] Site: ACE bandage in place    Assessment:     S/p CABGx3 (LIMA-LAD, SVG-OM1, SVG-PDA) on 8/16/2024.   S/p LLE I&D, exploration with washout on 12/6/24      Plan     Follow up with wound clinic as scheduled.   ACE bandage/dressing not removed at today's office visit.   RTO in 2-3 weeks                Marisol Hinkle PA-C 12/17/24 8:57 AM

## 2024-12-13 NOTE — PROGRESS NOTES
Nurse visit-cleansed wound cleanser patted dry and redressed per most recent wound care orders tolerated well.

## 2024-12-16 ENCOUNTER — HOSPITAL ENCOUNTER (OUTPATIENT)
Dept: WOUND CARE | Age: 69
Discharge: HOME | End: 2024-12-16
Attending: NURSE PRACTITIONER
Payer: COMMERCIAL

## 2024-12-16 VITALS — SYSTOLIC BLOOD PRESSURE: 171 MMHG | RESPIRATION RATE: 18 BRPM | DIASTOLIC BLOOD PRESSURE: 97 MMHG | HEART RATE: 77 BPM

## 2024-12-16 DIAGNOSIS — T81.89XD NONHEALING SURGICAL WOUND, SUBSEQUENT ENCOUNTER: Primary | ICD-10-CM

## 2024-12-16 DIAGNOSIS — R73.03 PREDIABETES: ICD-10-CM

## 2024-12-16 DIAGNOSIS — L97.922 SKIN ULCER OF LEFT LOWER LEG WITH FAT LAYER EXPOSED (HCC): ICD-10-CM

## 2024-12-16 PROBLEM — T81.89XS NONHEALING SURGICAL WOUND, SEQUELA: Status: RESOLVED | Noted: 2024-10-03 | Resolved: 2024-12-16

## 2024-12-16 PROCEDURE — 11042 DBRDMT SUBQ TIS 1ST 20SQCM/<: CPT

## 2024-12-16 PROCEDURE — 99213 OFFICE O/P EST LOW 20 MIN: CPT | Performed by: NURSE PRACTITIONER

## 2024-12-16 ASSESSMENT — PAIN DESCRIPTION - ORIENTATION: ORIENTATION: LEFT

## 2024-12-16 ASSESSMENT — PAIN DESCRIPTION - FREQUENCY: FREQUENCY: INTERMITTENT

## 2024-12-16 ASSESSMENT — PAIN DESCRIPTION - LOCATION: LOCATION: LEG

## 2024-12-16 ASSESSMENT — PAIN DESCRIPTION - DESCRIPTORS: DESCRIPTORS: SHARP

## 2024-12-16 ASSESSMENT — PAIN SCALES - GENERAL: PAINLEVEL_OUTOF10: 6

## 2024-12-16 NOTE — PATIENT INSTRUCTIONS
PHYSICIAN ORDERS AND DISCHARGE INSTRUCTIONS     Wound cleansing:               Do not scrub or use excessive force.              Wash hands with soap and water before and after dressing changes.              Prior to applying a clean dressing, cleanse wound with normal saline,               wound cleanser, or mild soap and water.               Ask the physician or nurse before getting the wound(s) wet in a shower                      Wound Care Notes:  Cardiology: Dr Scott   S/P CABG                                 Orders for this week:  2024              FAX TO [facility]     Left Lower Leg : Wash with soap and water, pat dry.   Bolster with steri strips.  Stimulen powder to wound bed.  Pack with liquid silver nitrate damp gauze.  Cover with Large agile and tegaderm secured with mastisol.  Wrap with double Ace from ankle to knee.   Leave in place until next visit to wound care center (please allow Carie to remove dressing below the steri strips for the next couple of weeks - need to moisten gauze with saline prior to removing)     Dispense 30 day quantity when ordering supplies.  Plan:        Nurse visit on Tuesday afternoon 24 (only if dressing removed at Dr. Zavala appt)   Otherwise will need nurse visits on  and .  Follow Up Instructions: 1 week   Primary Wound Care Provider: Carie Bowman CNP   Call  for any questions or concerns.  Central [phone] for imaging and lab work

## 2024-12-17 ENCOUNTER — HOSPITAL ENCOUNTER (OUTPATIENT)
Dept: WOUND CARE | Age: 69
Discharge: HOME | End: 2024-12-17
Attending: NURSE PRACTITIONER

## 2024-12-17 ENCOUNTER — OFFICE VISIT (OUTPATIENT)
Dept: CARDIOTHORACIC SURGERY | Age: 69
End: 2024-12-17

## 2024-12-17 VITALS
WEIGHT: 291 LBS | SYSTOLIC BLOOD PRESSURE: 128 MMHG | DIASTOLIC BLOOD PRESSURE: 76 MMHG | OXYGEN SATURATION: 96 % | HEIGHT: 70 IN | HEART RATE: 79 BPM | BODY MASS INDEX: 41.66 KG/M2

## 2024-12-17 DIAGNOSIS — L97.922 SKIN ULCER OF LEFT LOWER LEG WITH FAT LAYER EXPOSED (HCC): Primary | ICD-10-CM

## 2024-12-17 PROCEDURE — 99024 POSTOP FOLLOW-UP VISIT: CPT | Performed by: THORACIC SURGERY (CARDIOTHORACIC VASCULAR SURGERY)

## 2024-12-18 ENCOUNTER — HOSPITAL ENCOUNTER (OUTPATIENT)
Dept: WOUND CARE | Age: 69
Discharge: HOME | End: 2024-12-18
Attending: NURSE PRACTITIONER
Payer: COMMERCIAL

## 2024-12-18 VITALS
RESPIRATION RATE: 18 BRPM | SYSTOLIC BLOOD PRESSURE: 146 MMHG | HEART RATE: 77 BPM | TEMPERATURE: 97.2 F | DIASTOLIC BLOOD PRESSURE: 80 MMHG

## 2024-12-18 PROCEDURE — 99213 OFFICE O/P EST LOW 20 MIN: CPT

## 2024-12-18 ASSESSMENT — PAIN SCALES - GENERAL: PAINLEVEL_OUTOF10: 0

## 2024-12-18 ASSESSMENT — PAIN SCALES - WONG BAKER: WONGBAKER_NUMERICALRESPONSE: NO HURT

## 2024-12-19 ENCOUNTER — OFFICE VISIT (OUTPATIENT)
Dept: FAMILY MEDICINE CLINIC | Age: 69
End: 2024-12-19

## 2024-12-19 VITALS
HEART RATE: 81 BPM | DIASTOLIC BLOOD PRESSURE: 70 MMHG | OXYGEN SATURATION: 97 % | BODY MASS INDEX: 43.04 KG/M2 | SYSTOLIC BLOOD PRESSURE: 138 MMHG | TEMPERATURE: 97.5 F | HEIGHT: 69 IN | WEIGHT: 290.6 LBS

## 2024-12-19 DIAGNOSIS — Z23 NEED FOR TETANUS BOOSTER: ICD-10-CM

## 2024-12-19 DIAGNOSIS — Z23 NEED FOR SHINGLES VACCINE: ICD-10-CM

## 2024-12-19 DIAGNOSIS — J01.00 ACUTE MAXILLARY SINUSITIS, RECURRENCE NOT SPECIFIED: ICD-10-CM

## 2024-12-19 DIAGNOSIS — Z23 NEED FOR COVID-19 VACCINE: ICD-10-CM

## 2024-12-19 DIAGNOSIS — Z00.00 MEDICARE ANNUAL WELLNESS VISIT, SUBSEQUENT: Primary | ICD-10-CM

## 2024-12-19 DIAGNOSIS — Z29.11 NEED FOR PROPHYLACTIC VACCINATION AND INOCULATION AGAINST RESPIRATORY SYNCYTIAL VIRUS (RSV): ICD-10-CM

## 2024-12-19 LAB — SARS-COV-2: NEGATIVE

## 2024-12-19 SDOH — HEALTH STABILITY: PHYSICAL HEALTH: ON AVERAGE, HOW MANY DAYS PER WEEK DO YOU ENGAGE IN MODERATE TO STRENUOUS EXERCISE (LIKE A BRISK WALK)?: 0 DAYS

## 2024-12-19 ASSESSMENT — LIFESTYLE VARIABLES
HOW MANY STANDARD DRINKS CONTAINING ALCOHOL DO YOU HAVE ON A TYPICAL DAY: 1 OR 2
HOW OFTEN DO YOU HAVE A DRINK CONTAINING ALCOHOL: 2
HOW OFTEN DO YOU HAVE A DRINK CONTAINING ALCOHOL: MONTHLY OR LESS
HOW OFTEN DO YOU HAVE SIX OR MORE DRINKS ON ONE OCCASION: 1
HOW MANY STANDARD DRINKS CONTAINING ALCOHOL DO YOU HAVE ON A TYPICAL DAY: 1

## 2024-12-19 ASSESSMENT — PATIENT HEALTH QUESTIONNAIRE - PHQ9
SUM OF ALL RESPONSES TO PHQ9 QUESTIONS 1 & 2: 0
SUM OF ALL RESPONSES TO PHQ QUESTIONS 1-9: 0
1. LITTLE INTEREST OR PLEASURE IN DOING THINGS: NOT AT ALL
SUM OF ALL RESPONSES TO PHQ QUESTIONS 1-9: 0
2. FEELING DOWN, DEPRESSED OR HOPELESS: NOT AT ALL

## 2024-12-19 NOTE — PROGRESS NOTES
Surg Hx  Soc Hx  Fam Hx               Diagnosis Orders   1. Medicare annual wellness visit, subsequent        2. Need for prophylactic vaccination and inoculation against respiratory syncytial virus (RSV)        3. Need for tetanus booster        4. Need for shingles vaccine        5. Need for COVID-19 vaccine        6. Acute maxillary sinusitis, recurrence not specified  COVID-19        Get vaccines.  Has early sinus infection.  No ABX for now

## 2024-12-19 NOTE — PATIENT INSTRUCTIONS
range of preventive health benefits. Some of the tests and screenings are paid in full while other may be subject to a deductible, co-insurance, and/or copay.    Some of these benefits include a comprehensive review of your medical history including lifestyle, illnesses that may run in your family, and various assessments and screenings as appropriate.    After reviewing your medical record and screening and assessments performed today your provider may have ordered immunizations, labs, imaging, and/or referrals for you.  A list of these orders (if applicable) as well as your Preventive Care list are included within your After Visit Summary for your review.    Other Preventive Recommendations:    A preventive eye exam performed by an eye specialist is recommended every 1-2 years to screen for glaucoma; cataracts, macular degeneration, and other eye disorders.  A preventive dental visit is recommended every 6 months.  Try to get at least 150 minutes of exercise per week or 10,000 steps per day on a pedometer .  Order or download the FREE \"Exercise & Physical Activity: Your Everyday Guide\" from [employer]. Call 1-376.272.9766 or search [employer] online.  You need 4345-5109 mg of calcium and 1173-5624 IU of vitamin D per day. It is possible to meet your calcium requirement with diet alone, but a vitamin D supplement is usually necessary to meet this goal.  When exposed to the sun, use a sunscreen that protects against both UVA and UVB radiation with an SPF of 30 or greater. Reapply every 2 to 3 hours or after sweating, drying off with a towel, or swimming.  Always wear a seat belt when traveling in a car. Always wear a helmet when riding a bicycle or motorcycle.

## 2024-12-23 ENCOUNTER — HOSPITAL ENCOUNTER (OUTPATIENT)
Dept: WOUND CARE | Age: 69
Discharge: HOME | End: 2024-12-23
Attending: NURSE PRACTITIONER
Payer: COMMERCIAL

## 2024-12-23 ENCOUNTER — OFFICE VISIT (OUTPATIENT)
Dept: CARDIOLOGY CLINIC | Age: 69
End: 2024-12-23
Payer: COMMERCIAL

## 2024-12-23 VITALS
HEART RATE: 72 BPM | DIASTOLIC BLOOD PRESSURE: 84 MMHG | OXYGEN SATURATION: 98 % | WEIGHT: 288 LBS | HEIGHT: 71 IN | SYSTOLIC BLOOD PRESSURE: 138 MMHG | BODY MASS INDEX: 40.32 KG/M2

## 2024-12-23 VITALS
HEART RATE: 72 BPM | RESPIRATION RATE: 18 BRPM | DIASTOLIC BLOOD PRESSURE: 96 MMHG | SYSTOLIC BLOOD PRESSURE: 137 MMHG | TEMPERATURE: 98.8 F

## 2024-12-23 DIAGNOSIS — E78.5 HYPERLIPIDEMIA, UNSPECIFIED HYPERLIPIDEMIA TYPE: ICD-10-CM

## 2024-12-23 DIAGNOSIS — I25.10 ASCVD (ARTERIOSCLEROTIC CARDIOVASCULAR DISEASE): Primary | ICD-10-CM

## 2024-12-23 DIAGNOSIS — T14.8XXA NON-HEALING NON-SURGICAL WOUND: ICD-10-CM

## 2024-12-23 DIAGNOSIS — I10 PRIMARY HYPERTENSION: ICD-10-CM

## 2024-12-23 DIAGNOSIS — L97.922 SKIN ULCER OF LEFT LOWER LEG WITH FAT LAYER EXPOSED (HCC): ICD-10-CM

## 2024-12-23 DIAGNOSIS — E78.2 MIXED HYPERLIPIDEMIA: ICD-10-CM

## 2024-12-23 DIAGNOSIS — E66.01 OBESITY, CLASS III, BMI 40-49.9 (MORBID OBESITY): ICD-10-CM

## 2024-12-23 DIAGNOSIS — T81.89XD NONHEALING SURGICAL WOUND, SUBSEQUENT ENCOUNTER: Primary | ICD-10-CM

## 2024-12-23 PROCEDURE — 1123F ACP DISCUSS/DSCN MKR DOCD: CPT | Performed by: NURSE PRACTITIONER

## 2024-12-23 PROCEDURE — 1160F RVW MEDS BY RX/DR IN RCRD: CPT | Performed by: NURSE PRACTITIONER

## 2024-12-23 PROCEDURE — 3079F DIAST BP 80-89 MM HG: CPT | Performed by: NURSE PRACTITIONER

## 2024-12-23 PROCEDURE — 99213 OFFICE O/P EST LOW 20 MIN: CPT | Performed by: NURSE PRACTITIONER

## 2024-12-23 PROCEDURE — 1159F MED LIST DOCD IN RCRD: CPT | Performed by: NURSE PRACTITIONER

## 2024-12-23 PROCEDURE — 3075F SYST BP GE 130 - 139MM HG: CPT | Performed by: NURSE PRACTITIONER

## 2024-12-23 PROCEDURE — 99214 OFFICE O/P EST MOD 30 MIN: CPT | Performed by: NURSE PRACTITIONER

## 2024-12-23 PROCEDURE — 17250 CHEM CAUT OF GRANLTJ TISSUE: CPT

## 2024-12-23 ASSESSMENT — PAIN SCALES - GENERAL: PAINLEVEL_OUTOF10: 0

## 2024-12-23 ASSESSMENT — PAIN SCALES - WONG BAKER: WONGBAKER_NUMERICALRESPONSE: NO HURT

## 2024-12-23 ASSESSMENT — ENCOUNTER SYMPTOMS
SHORTNESS OF BREATH: 0
COUGH: 0

## 2024-12-23 NOTE — PATIENT INSTRUCTIONS
PHYSICIAN ORDERS AND DISCHARGE INSTRUCTIONS     Wound cleansing:               Do not scrub or use excessive force.              Wash hands with soap and water before and after dressing changes.              Prior to applying a clean dressing, cleanse wound with normal saline,               wound cleanser, or mild soap and water.               Ask the physician or nurse before getting the wound(s) wet in a shower                      Wound Care Notes:  Cardiology: Dr Scott   S/P CABG                                 Orders for this week:  2024              Left Lower Leg : Wash with soap and water, pat dry.   Bolster with steri strips.  Stimulen powder to wound bed.  Pack with liquid silver nitrate damp gauze.  Cover with Large agile and tegaderm secured with mastisol.  Wrap with double Ace from ankle to knee.   CHANGE : MONDAY   Dispense  day quantity when ordering supplies.  Plan:        Nurse Visit Thursday  Follow Up Instructions:  1 week   Primary Wound Care Provider: Carie Bowman CNP   Call  for any questions or concerns.  Central [phone] for imaging and lab work

## 2024-12-23 NOTE — PATIENT INSTRUCTIONS
We are committed to providing you the best care possible.    If you receive a survey after visiting one of our offices, please take time to share your experience concerning your physician office visit.  These surveys are confidential and no health information about you is shared.    We are eager to improve for you and we are counting on your feedback to help make that happen.      **It is YOUR responsibilty to bring medication bottles and/or updated medication list to EACH APPOINTMENT. This will allow us to better serve you and all your healthcare needs**  Thank you for allowing us to care for you today!   We want to ensure we can follow your treatment plan and we strive to give you the best outcomes and experience possible.   If you ever have a life threatening emergency and call 911 - for an ambulance (EMS)   Our providers can only care for you at:   [facility] or [facility].   Even if you have someone take you or you drive yourself we can only care for you in a [facility] facility. Our providers are not setup at the other healthcare locations!     Please be informed that if you contact our office outside of normal business hours the physician on call cannot help with any scheduling or rescheduling issues, procedure instruction questions or any type of medication issue.    We advise you for any urgent/emergency that you go to the nearest emergency room!    PLEASE CALL OUR OFFICE DURING NORMAL BUSINESS HOURS    Monday - Friday   8 am to 5 pm    [place]: [phone]    [place]: [phone]    [place]:  [phone]

## 2024-12-23 NOTE — PROGRESS NOTES
problems, questions, or concerns.Greater than 60 % of time spent counseling besides reviewing data and images       No follow-ups on file.      An electronic signature was used to authenticate this note.    Electronically signed by IVETTE Way CNP on 12/23/2024 at 11:52 AM

## 2024-12-23 NOTE — PROGRESS NOTES
Wound Care Center Progress Visit      Mark Bourgeois  AGE: 69 y.o.   GENDER: male  : 1955  EPISODE DATE:  2024   Referred by: Hyacinth Garcia MD     Subjective:     CHIEF COMPLAINT  WOUND   Problem List Items Addressed This Visit          Other    Skin ulcer of left lower leg with fat layer exposed (HCC)    Non-healing non-surgical wound    Nonhealing surgical wound, subsequent encounter - Primary       Chief Complaint   Patient presents with    Wound Check        HISTORY of PRESENT ILLNESS      Mark Bourgeois is a 69 y.o. 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 The condition is of moderate severity. The ulcer has been present since .  The underlying cause is thought to be nonhealing surgical post CABG 24. Most recently  s/p LEFT LOWER EXTREMITY EXPLORATION WITH WASHOUT; WOUND VAC PLACEMENT  on 24. The patients care to date has included evaluation per CV surgeon with referral to the wound clinic. The patient has significant underlying medical conditions as below. Hyacinth Garcia MD . Advanced wound care modalities established with initiation of care at the wound clinic.The patient has significant underlying medical conditions as below. Hyacinth Garcia MD .    Living Situation  [x] Home [] SNF []  Assisted living  [] Other (ie rehab, etc.) [] Home Health  []  Transportation    Wound Pain Timing/Severity: waxing and waning, mild  Quality of pain: dull, aching, tender  Severity of pain:  3 / 10   Modifying Factors: edema, obesity, and prediabetes  Associated Signs/Symptoms: edema, drainage, and pain    Wound status:  2024       Patient following up with Cardio, increased drainage with concern for excess bleeding with a history of overt bleeding post I&D washout. The dressing has moderate serosanguinous drainage, likely not related to complication with surgical wound but

## 2024-12-24 NOTE — PATIENT INSTRUCTIONS
PHYSICIAN ORDERS AND DISCHARGE INSTRUCTIONS     Wound cleansing:               Do not scrub or use excessive force.              Wash hands with soap and water before and after dressing changes.              Prior to applying a clean dressing, cleanse wound with normal saline,               wound cleanser, or mild soap and water.               Ask the physician or nurse before getting the wound(s) wet in a shower                      Wound Care Notes:  Cardiology: Dr Scott   S/P CABG                                 Orders for this week:  2024              Left Lower Leg : Wash with soap and water, pat dry.   Bolster with steri strips.  Stimulen powder to wound bed.  Pack with liquid silver nitrate damp gauze.  Cover with Large agile and tegaderm secured with mastisol.  Wrap with double Ace from ankle to knee.   CHANGE : MONDAY AND THURSDAY   Dispense 30 day quantity when ordering supplies.  Plan:          Follow Up Instructions:  1 week   Primary Wound Care Provider: Carie Bowman CNP   Call  for any questions or concerns.  Central [phone] for imaging and lab work

## 2024-12-26 ENCOUNTER — HOSPITAL ENCOUNTER (OUTPATIENT)
Dept: WOUND CARE | Age: 69
Discharge: HOME | End: 2024-12-26
Attending: NURSE PRACTITIONER
Payer: COMMERCIAL

## 2024-12-26 VITALS
HEART RATE: 74 BPM | TEMPERATURE: 98.5 F | RESPIRATION RATE: 18 BRPM | SYSTOLIC BLOOD PRESSURE: 139 MMHG | DIASTOLIC BLOOD PRESSURE: 82 MMHG

## 2024-12-26 PROCEDURE — 99213 OFFICE O/P EST LOW 20 MIN: CPT

## 2024-12-26 ASSESSMENT — PAIN DESCRIPTION - ORIENTATION: ORIENTATION: LEFT

## 2024-12-26 ASSESSMENT — PAIN SCALES - GENERAL: PAINLEVEL_OUTOF10: 5

## 2024-12-26 ASSESSMENT — PAIN DESCRIPTION - DESCRIPTORS: DESCRIPTORS: SHARP;BURNING

## 2024-12-26 ASSESSMENT — PAIN DESCRIPTION - LOCATION: LOCATION: LEG

## 2024-12-30 ENCOUNTER — HOSPITAL ENCOUNTER (OUTPATIENT)
Dept: WOUND CARE | Age: 69
Discharge: HOME | End: 2024-12-30
Attending: NURSE PRACTITIONER
Payer: COMMERCIAL

## 2024-12-30 VITALS
TEMPERATURE: 97.7 F | RESPIRATION RATE: 18 BRPM | DIASTOLIC BLOOD PRESSURE: 91 MMHG | SYSTOLIC BLOOD PRESSURE: 152 MMHG | HEART RATE: 80 BPM

## 2024-12-30 DIAGNOSIS — L97.922 SKIN ULCER OF LEFT LOWER LEG WITH FAT LAYER EXPOSED (HCC): Primary | ICD-10-CM

## 2024-12-30 DIAGNOSIS — T14.8XXA NON-HEALING NON-SURGICAL WOUND: ICD-10-CM

## 2024-12-30 DIAGNOSIS — R73.03 PREDIABETES: ICD-10-CM

## 2024-12-30 DIAGNOSIS — T81.89XD NONHEALING SURGICAL WOUND, SUBSEQUENT ENCOUNTER: ICD-10-CM

## 2024-12-30 PROCEDURE — 97597 DBRDMT OPN WND 1ST 20 CM/<: CPT

## 2024-12-30 PROCEDURE — 97597 DBRDMT OPN WND 1ST 20 CM/<: CPT | Performed by: NURSE PRACTITIONER

## 2024-12-30 RX ORDER — MUPIROCIN 20 MG/G
OINTMENT TOPICAL ONCE
OUTPATIENT
Start: 2024-12-30 | End: 2024-12-30

## 2024-12-30 RX ORDER — SILVER SULFADIAZINE 10 MG/G
CREAM TOPICAL ONCE
OUTPATIENT
Start: 2024-12-30 | End: 2024-12-30

## 2024-12-30 RX ORDER — BACITRACIN ZINC AND POLYMYXIN B SULFATE 500; 1000 [USP'U]/G; [USP'U]/G
OINTMENT TOPICAL ONCE
OUTPATIENT
Start: 2024-12-30 | End: 2024-12-30

## 2024-12-30 RX ORDER — SODIUM CHLOR/HYPOCHLOROUS ACID 0.033 %
SOLUTION, IRRIGATION IRRIGATION ONCE
OUTPATIENT
Start: 2024-12-30 | End: 2024-12-30

## 2024-12-30 RX ORDER — NEOMYCIN/BACITRACIN/POLYMYXINB 3.5-400-5K
OINTMENT (GRAM) TOPICAL ONCE
OUTPATIENT
Start: 2024-12-30 | End: 2024-12-30

## 2024-12-30 RX ORDER — LIDOCAINE 50 MG/G
OINTMENT TOPICAL ONCE
OUTPATIENT
Start: 2024-12-30 | End: 2024-12-30

## 2024-12-30 RX ORDER — CLOBETASOL PROPIONATE 0.5 MG/G
OINTMENT TOPICAL ONCE
OUTPATIENT
Start: 2024-12-30 | End: 2024-12-30

## 2024-12-30 RX ORDER — LIDOCAINE HYDROCHLORIDE 20 MG/ML
JELLY TOPICAL ONCE
OUTPATIENT
Start: 2024-12-30 | End: 2024-12-30

## 2024-12-30 RX ORDER — BETAMETHASONE DIPROPIONATE 0.5 MG/G
CREAM TOPICAL ONCE
OUTPATIENT
Start: 2024-12-30 | End: 2024-12-30

## 2024-12-30 RX ORDER — GENTAMICIN SULFATE 1 MG/G
OINTMENT TOPICAL ONCE
OUTPATIENT
Start: 2024-12-30 | End: 2024-12-30

## 2024-12-30 RX ORDER — LIDOCAINE 40 MG/G
CREAM TOPICAL ONCE
OUTPATIENT
Start: 2024-12-30 | End: 2024-12-30

## 2024-12-30 RX ORDER — TRIAMCINOLONE ACETONIDE 1 MG/G
OINTMENT TOPICAL ONCE
OUTPATIENT
Start: 2024-12-30 | End: 2024-12-30

## 2024-12-30 RX ORDER — GINSENG 100 MG
CAPSULE ORAL ONCE
OUTPATIENT
Start: 2024-12-30 | End: 2024-12-30

## 2024-12-30 RX ORDER — LIDOCAINE HYDROCHLORIDE 40 MG/ML
SOLUTION TOPICAL ONCE
OUTPATIENT
Start: 2024-12-30 | End: 2024-12-30

## 2024-12-30 ASSESSMENT — PAIN SCALES - GENERAL: PAINLEVEL_OUTOF10: 0

## 2024-12-30 NOTE — PATIENT INSTRUCTIONS
PHYSICIAN ORDERS AND DISCHARGE INSTRUCTIONS     Wound cleansing:               Do not scrub or use excessive force.              Wash hands with soap and water before and after dressing changes.              Prior to applying a clean dressing, cleanse wound with normal saline,               wound cleanser, or mild soap and water.               Ask the physician or nurse before getting the wound(s) wet in a shower                      Wound Care Notes:  Cardiology: Dr Scott   S/P CABG                                 Orders for this week:  2024              Left Lower Leg : Wash with soap and water, pat dry.   Bolster with steri strips.  Stimulen powder to wound bed.  Pack with liquid silver nitrate damp gauze.  Cover with Large agile and tegaderm secured with mastisol.  Wrap with double Ace from ankle to knee.   CHANGE :    Dispense 30 day quantity when ordering supplies.  Plan:          Follow Up Instructions:  1 week   Primary Wound Care Provider: Carie Bowman CNP   Call  for any questions or concerns.  Central [phone] for imaging and lab work

## 2024-12-30 NOTE — PROGRESS NOTES
Undermining Maxium Distance (cm) 0 24   Wound Assessment Pink/red 24   Drainage Amount Moderate (25-50%) 24   Drainage Description Serosanguinous 24   Odor None 24   Stephanie-wound Assessment Fragile;Intact 24   Margins Defined edges 24   Wound Thickness Description not for Pressure Injury Full thickness 24   Number of days: 24       Total  Area  Debrided:  5.85 sq cm     Bleeding:  None    Hemostasis Achieved:  not needed    Procedural Pain:  2  / 10     Post Procedural Pain:  1 / 10     Response to treatment:  Well tolerated by patient.         Plan:     Discharge instructions:    Patient Instructions   PHYSICIAN ORDERS AND DISCHARGE INSTRUCTIONS     Wound cleansing:               Do not scrub or use excessive force.              Wash hands with soap and water before and after dressing changes.              Prior to applying a clean dressing, cleanse wound with normal saline,               wound cleanser, or mild soap and water.               Ask the physician or nurse before getting the wound(s) wet in a shower                      Wound Care Notes:  Cardiology: Dr Scott   S/P CABG                                 Orders for this week:  2024              Left Lower Leg : Wash with soap and water, pat dry.   Bolster with steri strips.  Stimulen powder to wound bed.  Pack with liquid silver nitrate damp gauze.  Cover with Large agile and tegaderm secured with mastisol.  Wrap with double Ace from ankle to knee.   CHANGE :    Dispense 30 day quantity when ordering supplies.  Plan:          Follow Up Instructions:  1 week   Primary Wound Care Provider: Carie Bowman CNP   Call  for any questions or concerns.  Central [phone] for imaging and lab work    Treatment Note Wound 24 Pretibial Left;Medial #1-Dressing/Treatment: Other (comment) (bolster with steristrips, stimulen, liquid

## 2025-01-02 ENCOUNTER — TELEPHONE (OUTPATIENT)
Dept: CARDIOTHORACIC SURGERY | Age: 70
End: 2025-01-02

## 2025-01-06 ENCOUNTER — HOSPITAL ENCOUNTER (OUTPATIENT)
Dept: WOUND CARE | Age: 70
Discharge: HOME | End: 2025-01-06
Attending: NURSE PRACTITIONER
Payer: COMMERCIAL

## 2025-01-06 VITALS
TEMPERATURE: 97.2 F | DIASTOLIC BLOOD PRESSURE: 85 MMHG | HEART RATE: 92 BPM | RESPIRATION RATE: 18 BRPM | SYSTOLIC BLOOD PRESSURE: 153 MMHG

## 2025-01-06 DIAGNOSIS — T81.89XD NONHEALING SURGICAL WOUND, SUBSEQUENT ENCOUNTER: ICD-10-CM

## 2025-01-06 DIAGNOSIS — R73.03 PREDIABETES: ICD-10-CM

## 2025-01-06 DIAGNOSIS — R60.0 BILATERAL LEG EDEMA: ICD-10-CM

## 2025-01-06 DIAGNOSIS — T14.8XXA NON-HEALING NON-SURGICAL WOUND: ICD-10-CM

## 2025-01-06 DIAGNOSIS — E66.01 OBESITY, CLASS III, BMI 40-49.9 (MORBID OBESITY): ICD-10-CM

## 2025-01-06 DIAGNOSIS — L97.922 SKIN ULCER OF LEFT LOWER LEG WITH FAT LAYER EXPOSED (HCC): Primary | ICD-10-CM

## 2025-01-06 PROCEDURE — 97597 DBRDMT OPN WND 1ST 20 CM/<: CPT | Performed by: NURSE PRACTITIONER

## 2025-01-06 PROCEDURE — 11042 DBRDMT SUBQ TIS 1ST 20SQCM/<: CPT

## 2025-01-06 RX ORDER — SODIUM CHLOR/HYPOCHLOROUS ACID 0.033 %
SOLUTION, IRRIGATION IRRIGATION ONCE
OUTPATIENT
Start: 2025-01-06 | End: 2025-01-06

## 2025-01-06 RX ORDER — TRIAMCINOLONE ACETONIDE 1 MG/G
OINTMENT TOPICAL ONCE
OUTPATIENT
Start: 2025-01-06 | End: 2025-01-06

## 2025-01-06 RX ORDER — LIDOCAINE 40 MG/G
CREAM TOPICAL ONCE
OUTPATIENT
Start: 2025-01-06 | End: 2025-01-06

## 2025-01-06 RX ORDER — LIDOCAINE HYDROCHLORIDE 20 MG/ML
JELLY TOPICAL ONCE
OUTPATIENT
Start: 2025-01-06 | End: 2025-01-06

## 2025-01-06 RX ORDER — CLOBETASOL PROPIONATE 0.5 MG/G
OINTMENT TOPICAL ONCE
OUTPATIENT
Start: 2025-01-06 | End: 2025-01-06

## 2025-01-06 RX ORDER — SILVER SULFADIAZINE 10 MG/G
CREAM TOPICAL ONCE
OUTPATIENT
Start: 2025-01-06 | End: 2025-01-06

## 2025-01-06 RX ORDER — BETAMETHASONE DIPROPIONATE 0.5 MG/G
CREAM TOPICAL ONCE
OUTPATIENT
Start: 2025-01-06 | End: 2025-01-06

## 2025-01-06 RX ORDER — GENTAMICIN SULFATE 1 MG/G
OINTMENT TOPICAL ONCE
OUTPATIENT
Start: 2025-01-06 | End: 2025-01-06

## 2025-01-06 RX ORDER — NEOMYCIN/BACITRACIN/POLYMYXINB 3.5-400-5K
OINTMENT (GRAM) TOPICAL ONCE
OUTPATIENT
Start: 2025-01-06 | End: 2025-01-06

## 2025-01-06 RX ORDER — LIDOCAINE HYDROCHLORIDE 40 MG/ML
SOLUTION TOPICAL ONCE
OUTPATIENT
Start: 2025-01-06 | End: 2025-01-06

## 2025-01-06 RX ORDER — MUPIROCIN 20 MG/G
OINTMENT TOPICAL ONCE
OUTPATIENT
Start: 2025-01-06 | End: 2025-01-06

## 2025-01-06 RX ORDER — LIDOCAINE 50 MG/G
OINTMENT TOPICAL ONCE
OUTPATIENT
Start: 2025-01-06 | End: 2025-01-06

## 2025-01-06 RX ORDER — GINSENG 100 MG
CAPSULE ORAL ONCE
OUTPATIENT
Start: 2025-01-06 | End: 2025-01-06

## 2025-01-06 RX ORDER — BACITRACIN ZINC AND POLYMYXIN B SULFATE 500; 1000 [USP'U]/G; [USP'U]/G
OINTMENT TOPICAL ONCE
OUTPATIENT
Start: 2025-01-06 | End: 2025-01-06

## 2025-01-06 ASSESSMENT — PAIN DESCRIPTION - LOCATION: LOCATION: LEG

## 2025-01-06 ASSESSMENT — PAIN DESCRIPTION - ORIENTATION: ORIENTATION: LEFT

## 2025-01-06 ASSESSMENT — PAIN DESCRIPTION - DESCRIPTORS: DESCRIPTORS: BURNING;SHARP

## 2025-01-06 ASSESSMENT — PAIN SCALES - GENERAL: PAINLEVEL_OUTOF10: 3

## 2025-01-06 NOTE — PROGRESS NOTES
Obesity, Class III, BMI 40-49.9 (morbid obesity)    Relevant Orders    Initiate Outpatient Wound Care Protocol    Prediabetes    Relevant Orders    Initiate Outpatient Wound Care Protocol    Bilateral leg edema    Relevant Orders    Initiate Outpatient Wound Care Protocol    Skin ulcer of left lower leg with fat layer exposed (HCC) - Primary    Relevant Orders    Initiate Outpatient Wound Care Protocol    Non-healing non-surgical wound    Relevant Orders    Initiate Outpatient Wound Care Protocol    Nonhealing surgical wound, subsequent encounter    Relevant Orders    Initiate Outpatient Wound Care Protocol       Procedure Note    Indications:  Based on my examination of this patient's wound(s) today, sharp excision into devitalized tissue is required to promote healing and evaluate the extent of previous healing.    Performed by: IVETTE Dunham - CNP    Consent obtained: Yes    Time out taken:  Yes    Pain Control: not needed    Debridement: Excisional Debridement    Using curette the wound(s) was/were sharply debrided down through and including the removal of devitalized tissue.        Devitalized Tissue Debrided: slough and exudate    Pre Debridement Measurements:  Are located in the Wound Documentation Flow Sheet    All active wounds listed below with today's date are evaluated  Wound(s) debrided this date include # : 1     Post  Debridement Measurements:  Wound 08/15/24 Abdomen Lower;Medial (Active)   Number of days: 144       Wound 12/05/24 Pretibial Left;Medial #1 (Active)   Wound Image   01/06/25 0815   Wound Etiology Non-Healing Surgical 12/18/24 1035   Dressing Status New dressing applied;Clean;Dry 01/06/25 0844   Wound Cleansed Soap and water;Wound cleanser 01/06/25 0815   Dressing/Treatment Other (comment) 12/30/24 0851   Offloading for Diabetic Foot Ulcers Offloading not required 01/06/25 0844   Dressing Change Due 12/06/24 12/08/24 1500   Wound Length (cm) 3.5 cm 01/06/25 0815   Wound Width (cm)

## 2025-01-06 NOTE — PATIENT INSTRUCTIONS
PHYSICIAN ORDERS AND DISCHARGE INSTRUCTIONS     Wound cleansing:               Do not scrub or use excessive force.              Wash hands with soap and water before and after dressing changes.              Prior to applying a clean dressing, cleanse wound with normal saline,               wound cleanser, or mild soap and water.               Ask the physician or nurse before getting the wound(s) wet in a shower                      Wound Care Notes:  Cardiology: Dr Scott   S/P CABG                                 Orders for this week:  2025              Left Lower Leg : Wash with soap and water, pat dry.   Stimulen powder to wound bed.  Apply saline damp hydraferra blue to depth and cut to fit   Cover with Large agile and tegaderm secured with mastisol.  Wrap with double Ace from ankle to knee.   CHANGE :    Dispense 30 day quantity when ordering supplies.  Plan:          Follow Up Instructions:  1 week   Primary Wound Care Provider: Carie Bowman CNP   Call  for any questions or concerns.  Central [phone] for imaging and lab work

## 2025-01-07 ENCOUNTER — OFFICE VISIT (OUTPATIENT)
Dept: CARDIOTHORACIC SURGERY | Age: 70
End: 2025-01-07
Payer: COMMERCIAL

## 2025-01-07 VITALS
OXYGEN SATURATION: 96 % | WEIGHT: 290.25 LBS | DIASTOLIC BLOOD PRESSURE: 78 MMHG | BODY MASS INDEX: 40.64 KG/M2 | HEIGHT: 71 IN | HEART RATE: 72 BPM | SYSTOLIC BLOOD PRESSURE: 126 MMHG

## 2025-01-07 DIAGNOSIS — L97.922 SKIN ULCER OF LEFT LOWER LEG WITH FAT LAYER EXPOSED (HCC): ICD-10-CM

## 2025-01-07 DIAGNOSIS — Z09 POSTOPERATIVE FOLLOW-UP: Primary | ICD-10-CM

## 2025-01-07 PROCEDURE — 3074F SYST BP LT 130 MM HG: CPT | Performed by: PHYSICIAN ASSISTANT

## 2025-01-07 PROCEDURE — 3078F DIAST BP <80 MM HG: CPT | Performed by: PHYSICIAN ASSISTANT

## 2025-01-07 PROCEDURE — 99213 OFFICE O/P EST LOW 20 MIN: CPT | Performed by: PHYSICIAN ASSISTANT

## 2025-01-07 PROCEDURE — 1159F MED LIST DOCD IN RCRD: CPT | Performed by: PHYSICIAN ASSISTANT

## 2025-01-07 PROCEDURE — 1123F ACP DISCUSS/DSCN MKR DOCD: CPT | Performed by: PHYSICIAN ASSISTANT

## 2025-01-07 NOTE — PROGRESS NOTES
Cardiothoracic Surgery   Postoperative Follow Up    Cardiologist:  Dr Scott     Procedure:  CABGx3 (LIMA-LAD, SVG-OM1, SVG-PDA) on 8/16/2024.   S/p LLE I&D, exploration with washout on 12/6/24      HISTORY OF PRESENT ILLNESS       Mark Bourgeois is a 69 y.o. male who presents today for a postoperative follow up appointment. He continues to follow weekly with wound care- no antibiotics at this time. Wound is packed per wound center and ACE bandage remains in place.       OBJECTIVE   VITALS:  /78 (Site: Left Upper Arm, Position: Sitting, Cuff Size: Large Adult)   Pulse 72   Ht 1.803 m (5' 10.98\")   Wt 131.7 kg (290 lb 4 oz)   SpO2 96%   BMI 40.50 kg/m²      Physical Exam:  Gen: alert, well appearing, and in no distress, oriented to person, place, and time, and normal appearing weight  Chest:  Sternotomy site well approximated, clean and dry.  No signs of erythema, swelling, or drainage. Chest tube insertion sites clean and dry.  No signs of infection.  Lung: clear to auscultation, no wheezes or rales, and unlabored breathing  Heart: S1 and S2 normal, no murmur, click, gallop or rub  Extremities: peripheral pulses normal, no pedal edema, no clubbing or cyanosis       [facility] Site: ACE bandage in place    Assessment:     S/p CABGx3 (LIMA-LAD, SVG-OM1, SVG-PDA) on 8/16/2024.   S/p LLE I&D, exploration with washout on 12/6/24      Plan     Follow up with wound clinic as scheduled.   ACE bandage/dressing not removed at today's office visit.   RTO in 6 weeks when wound is healed  Continue Lasix 40mg+ Potassium 20meq daily      Marisol Hinkle PA-C 01/07/25 9:51 AM

## 2025-01-08 ENCOUNTER — TELEPHONE (OUTPATIENT)
Dept: CARDIOTHORACIC SURGERY | Age: 70
End: 2025-01-08

## 2025-01-08 NOTE — TELEPHONE ENCOUNTER
Per Dr. Zavala, patient is to resume his Plavix & ASA 81mg daily.     Spoke with patient, patient verbalized understanding.

## 2025-01-09 RX ORDER — CLOPIDOGREL BISULFATE 75 MG/1
75 TABLET ORAL DAILY
Qty: 30 TABLET | Refills: 3 | Status: SHIPPED | OUTPATIENT
Start: 2025-01-09

## 2025-01-15 ENCOUNTER — HOSPITAL ENCOUNTER (OUTPATIENT)
Dept: WOUND CARE | Age: 70
Discharge: HOME | End: 2025-01-15
Attending: NURSE PRACTITIONER
Payer: COMMERCIAL

## 2025-01-15 VITALS
DIASTOLIC BLOOD PRESSURE: 78 MMHG | TEMPERATURE: 98 F | SYSTOLIC BLOOD PRESSURE: 146 MMHG | RESPIRATION RATE: 18 BRPM | HEART RATE: 84 BPM

## 2025-01-15 DIAGNOSIS — T14.8XXA NON-HEALING NON-SURGICAL WOUND: ICD-10-CM

## 2025-01-15 DIAGNOSIS — R73.03 PREDIABETES: ICD-10-CM

## 2025-01-15 DIAGNOSIS — L97.922 SKIN ULCER OF LEFT LOWER LEG WITH FAT LAYER EXPOSED (HCC): Primary | ICD-10-CM

## 2025-01-15 DIAGNOSIS — E66.01 OBESITY, CLASS III, BMI 40-49.9 (MORBID OBESITY): ICD-10-CM

## 2025-01-15 DIAGNOSIS — T81.89XD NONHEALING SURGICAL WOUND, SUBSEQUENT ENCOUNTER: ICD-10-CM

## 2025-01-15 DIAGNOSIS — R60.0 BILATERAL LEG EDEMA: ICD-10-CM

## 2025-01-15 PROCEDURE — 11042 DBRDMT SUBQ TIS 1ST 20SQCM/<: CPT | Performed by: NURSE PRACTITIONER

## 2025-01-15 PROCEDURE — 11042 DBRDMT SUBQ TIS 1ST 20SQCM/<: CPT

## 2025-01-15 RX ORDER — GENTAMICIN SULFATE 1 MG/G
OINTMENT TOPICAL ONCE
OUTPATIENT
Start: 2025-01-15 | End: 2025-01-15

## 2025-01-15 RX ORDER — TRIAMCINOLONE ACETONIDE 1 MG/G
OINTMENT TOPICAL ONCE
OUTPATIENT
Start: 2025-01-15 | End: 2025-01-15

## 2025-01-15 RX ORDER — MUPIROCIN 20 MG/G
OINTMENT TOPICAL ONCE
OUTPATIENT
Start: 2025-01-15 | End: 2025-01-15

## 2025-01-15 RX ORDER — LIDOCAINE 50 MG/G
OINTMENT TOPICAL ONCE
OUTPATIENT
Start: 2025-01-15 | End: 2025-01-15

## 2025-01-15 RX ORDER — NEOMYCIN/BACITRACIN/POLYMYXINB 3.5-400-5K
OINTMENT (GRAM) TOPICAL ONCE
OUTPATIENT
Start: 2025-01-15 | End: 2025-01-15

## 2025-01-15 RX ORDER — CLOBETASOL PROPIONATE 0.5 MG/G
OINTMENT TOPICAL ONCE
OUTPATIENT
Start: 2025-01-15 | End: 2025-01-15

## 2025-01-15 RX ORDER — SODIUM CHLOR/HYPOCHLOROUS ACID 0.033 %
SOLUTION, IRRIGATION IRRIGATION ONCE
OUTPATIENT
Start: 2025-01-15 | End: 2025-01-15

## 2025-01-15 RX ORDER — LIDOCAINE HYDROCHLORIDE 20 MG/ML
JELLY TOPICAL ONCE
OUTPATIENT
Start: 2025-01-15 | End: 2025-01-15

## 2025-01-15 RX ORDER — LIDOCAINE 40 MG/G
CREAM TOPICAL ONCE
OUTPATIENT
Start: 2025-01-15 | End: 2025-01-15

## 2025-01-15 RX ORDER — BETAMETHASONE DIPROPIONATE 0.5 MG/G
CREAM TOPICAL ONCE
OUTPATIENT
Start: 2025-01-15 | End: 2025-01-15

## 2025-01-15 RX ORDER — GINSENG 100 MG
CAPSULE ORAL ONCE
OUTPATIENT
Start: 2025-01-15 | End: 2025-01-15

## 2025-01-15 RX ORDER — BACITRACIN ZINC AND POLYMYXIN B SULFATE 500; 1000 [USP'U]/G; [USP'U]/G
OINTMENT TOPICAL ONCE
OUTPATIENT
Start: 2025-01-15 | End: 2025-01-15

## 2025-01-15 RX ORDER — SILVER SULFADIAZINE 10 MG/G
CREAM TOPICAL ONCE
OUTPATIENT
Start: 2025-01-15 | End: 2025-01-15

## 2025-01-15 RX ORDER — LIDOCAINE HYDROCHLORIDE 40 MG/ML
SOLUTION TOPICAL ONCE
OUTPATIENT
Start: 2025-01-15 | End: 2025-01-15

## 2025-01-15 ASSESSMENT — PAIN DESCRIPTION - ORIENTATION: ORIENTATION: LEFT

## 2025-01-15 ASSESSMENT — PAIN SCALES - GENERAL: PAINLEVEL_OUTOF10: 5

## 2025-01-15 ASSESSMENT — PAIN DESCRIPTION - FREQUENCY: FREQUENCY: INTERMITTENT

## 2025-01-15 ASSESSMENT — PAIN DESCRIPTION - LOCATION: LOCATION: LEG

## 2025-01-15 ASSESSMENT — PAIN DESCRIPTION - DESCRIPTORS: DESCRIPTORS: SHARP;BURNING

## 2025-01-15 NOTE — PATIENT INSTRUCTIONS
PHYSICIAN ORDERS AND DISCHARGE INSTRUCTIONS     Wound cleansing:               Do not scrub or use excessive force.              Wash hands with soap and water before and after dressing changes.              Prior to applying a clean dressing, cleanse wound with normal saline,               wound cleanser, or mild soap and water.               Ask the physician or nurse before getting the wound(s) wet in a shower                      Wound Care Notes:  Cardiology: Dr Scott   S/P CABG                                 Orders for this week:  1/15/2025              Left Lower Leg : Wash with soap and water, pat dry.   Stimulen powder to wound bed.  Apply saline damp hydraferra blue to depth and cut to fit   Cover with Large agile and tegaderm secured with mastisol.  Wrap with double Ace from ankle to knee.   Leave in place for 1 week  Dispense 30 day quantity when ordering supplies.  Plan:          Follow Up Instructions:  1 week   Primary Wound Care Provider: Carie Bowman CNP   Call  for any questions or concerns.  Central [phone] for imaging and lab work

## 2025-01-15 NOTE — PROGRESS NOTES
(cm) 2.8 cm 01/15/25 0904   Post-Procedure Width (cm) 1.3 cm 01/15/25 0904   Post-Procedure Depth (cm) 0.1 cm 01/15/25 0904   Post-Procedure Surface Area (cm^2) 3.64 cm^2 01/15/25 0904   Post-Procedure Volume (cm^3) 0.364 cm^3 01/15/25 0904   Distance Tunneling (cm) 0 cm 01/15/25 0841   Tunneling Position ___ O'Clock 0 01/15/25 0841   Undermining Starts ___ O'Clock 0 01/15/25 0841   Undermining Ends___ O'Clock 0 01/15/25 0841   Undermining Maxium Distance (cm) 0 01/15/25 0841   Wound Assessment Pink/red;Hyper granulation tissue 01/15/25 0841   Drainage Amount Moderate (25-50%) 01/15/25 0841   Drainage Description Serosanguinous 01/15/25 0841   Odor None 01/15/25 0841   Stephanie-wound Assessment Intact 01/15/25 0841   Margins Defined edges 01/15/25 0841   Wound Thickness Description not for Pressure Injury Full thickness 01/15/25 0841   Number of days: 40       Total  Area  Debrided:  3.64 sq cm     Bleeding:  Minimal    Hemostasis Achieved:  by pressure    Procedural Pain:  2  / 10     Post Procedural Pain:  1 / 10     Response to treatment:  Well tolerated by patient.             Plan:     Discharge instructions:    Patient Instructions   PHYSICIAN ORDERS AND DISCHARGE INSTRUCTIONS     Wound cleansing:               Do not scrub or use excessive force.              Wash hands with soap and water before and after dressing changes.              Prior to applying a clean dressing, cleanse wound with normal saline,               wound cleanser, or mild soap and water.               Ask the physician or nurse before getting the wound(s) wet in a shower                      Wound Care Notes:  Cardiology: Dr Scott   S/P CABG                                 Orders for this week:  1/15/2025              Left Lower Leg : Wash with soap and water, pat dry.   Stimulen powder to wound bed.  Apply saline damp hydraferra blue to depth and cut to fit   Cover with Large agile and tegaderm secured with mastisol.  Wrap with double Ace

## 2025-01-22 ENCOUNTER — HOSPITAL ENCOUNTER (OUTPATIENT)
Dept: WOUND CARE | Age: 70
Discharge: HOME | End: 2025-01-22
Attending: NURSE PRACTITIONER
Payer: COMMERCIAL

## 2025-01-22 VITALS
SYSTOLIC BLOOD PRESSURE: 159 MMHG | HEART RATE: 83 BPM | TEMPERATURE: 98 F | DIASTOLIC BLOOD PRESSURE: 77 MMHG | RESPIRATION RATE: 20 BRPM

## 2025-01-22 DIAGNOSIS — T14.8XXA NON-HEALING NON-SURGICAL WOUND: ICD-10-CM

## 2025-01-22 DIAGNOSIS — L97.922 SKIN ULCER OF LEFT LOWER LEG WITH FAT LAYER EXPOSED (HCC): ICD-10-CM

## 2025-01-22 DIAGNOSIS — T81.89XD NONHEALING SURGICAL WOUND, SUBSEQUENT ENCOUNTER: ICD-10-CM

## 2025-01-22 DIAGNOSIS — R60.0 BILATERAL LEG EDEMA: Primary | ICD-10-CM

## 2025-01-22 DIAGNOSIS — E66.01 OBESITY, CLASS III, BMI 40-49.9 (MORBID OBESITY): ICD-10-CM

## 2025-01-22 DIAGNOSIS — R73.03 PREDIABETES: ICD-10-CM

## 2025-01-22 PROCEDURE — 17250 CHEM CAUT OF GRANLTJ TISSUE: CPT | Performed by: NURSE PRACTITIONER

## 2025-01-22 PROCEDURE — 17250 CHEM CAUT OF GRANLTJ TISSUE: CPT

## 2025-01-22 RX ORDER — LIDOCAINE HYDROCHLORIDE 20 MG/ML
JELLY TOPICAL ONCE
OUTPATIENT
Start: 2025-01-22 | End: 2025-01-22

## 2025-01-22 RX ORDER — LIDOCAINE 50 MG/G
OINTMENT TOPICAL ONCE
OUTPATIENT
Start: 2025-01-22 | End: 2025-01-22

## 2025-01-22 RX ORDER — LIDOCAINE 40 MG/G
CREAM TOPICAL ONCE
OUTPATIENT
Start: 2025-01-22 | End: 2025-01-22

## 2025-01-22 RX ORDER — MUPIROCIN 20 MG/G
OINTMENT TOPICAL ONCE
OUTPATIENT
Start: 2025-01-22 | End: 2025-01-22

## 2025-01-22 RX ORDER — SILVER SULFADIAZINE 10 MG/G
CREAM TOPICAL ONCE
OUTPATIENT
Start: 2025-01-22 | End: 2025-01-22

## 2025-01-22 RX ORDER — BETAMETHASONE DIPROPIONATE 0.5 MG/G
CREAM TOPICAL ONCE
OUTPATIENT
Start: 2025-01-22 | End: 2025-01-22

## 2025-01-22 RX ORDER — CLOBETASOL PROPIONATE 0.5 MG/G
OINTMENT TOPICAL ONCE
OUTPATIENT
Start: 2025-01-22 | End: 2025-01-22

## 2025-01-22 RX ORDER — NEOMYCIN/BACITRACIN/POLYMYXINB 3.5-400-5K
OINTMENT (GRAM) TOPICAL ONCE
OUTPATIENT
Start: 2025-01-22 | End: 2025-01-22

## 2025-01-22 RX ORDER — SODIUM CHLOR/HYPOCHLOROUS ACID 0.033 %
SOLUTION, IRRIGATION IRRIGATION ONCE
OUTPATIENT
Start: 2025-01-22 | End: 2025-01-22

## 2025-01-22 RX ORDER — TRIAMCINOLONE ACETONIDE 1 MG/G
OINTMENT TOPICAL ONCE
OUTPATIENT
Start: 2025-01-22 | End: 2025-01-22

## 2025-01-22 RX ORDER — BACITRACIN ZINC AND POLYMYXIN B SULFATE 500; 1000 [USP'U]/G; [USP'U]/G
OINTMENT TOPICAL ONCE
OUTPATIENT
Start: 2025-01-22 | End: 2025-01-22

## 2025-01-22 RX ORDER — LIDOCAINE HYDROCHLORIDE 40 MG/ML
SOLUTION TOPICAL ONCE
OUTPATIENT
Start: 2025-01-22 | End: 2025-01-22

## 2025-01-22 RX ORDER — GINSENG 100 MG
CAPSULE ORAL ONCE
OUTPATIENT
Start: 2025-01-22 | End: 2025-01-22

## 2025-01-22 RX ORDER — GENTAMICIN SULFATE 1 MG/G
OINTMENT TOPICAL ONCE
OUTPATIENT
Start: 2025-01-22 | End: 2025-01-22

## 2025-01-22 NOTE — PROGRESS NOTES
Wound Care Center Progress Visit      Mark Bourgeois  AGE: 69 y.o.   GENDER: male  : 1955  EPISODE DATE:  2025   Referred by: Hyacinth Garcia MD     Subjective:     CHIEF COMPLAINT  WOUND   Problem List Items Addressed This Visit          Other    Obesity, Class III, BMI 40-49.9 (morbid obesity)    Relevant Orders    Initiate Outpatient Wound Care Protocol    Prediabetes    Relevant Orders    Initiate Outpatient Wound Care Protocol    Bilateral leg edema - Primary    Relevant Orders    Initiate Outpatient Wound Care Protocol    Skin ulcer of left lower leg with fat layer exposed (HCC)    Relevant Orders    Initiate Outpatient Wound Care Protocol    Non-healing non-surgical wound    Relevant Orders    Initiate Outpatient Wound Care Protocol    Nonhealing surgical wound, subsequent encounter    Relevant Orders    Initiate Outpatient Wound Care Protocol       Chief Complaint   Patient presents with    Wound Check        HISTORY of PRESENT ILLNESS      Mark Bourgeois is a 69 y.o. 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 The condition is of moderate severity. The ulcer has been present since .  The underlying cause is thought to be nonhealing surgical post CABG 24. Most recently  s/p LEFT LOWER EXTREMITY EXPLORATION WITH WASHOUT; WOUND VAC PLACEMENT  on 24. The patients care to date has included evaluation per CV surgeon with referral to the wound clinic. The patient has significant underlying medical conditions as below. Hyacinth Garcia MD . Advanced wound care modalities established with initiation of care at the wound clinic.The patient has significant underlying medical conditions as below. Haycinth Garcia MD .    Living Situation  [x] Home [] SNF []  Assisted living  [] Other (ie rehab, etc.) [] Home Health  []  Transportation    Wound Pain Timing/Severity: waxing and waning,

## 2025-01-22 NOTE — PATIENT INSTRUCTIONS
PHYSICIAN ORDERS AND DISCHARGE INSTRUCTIONS     Wound cleansing:               Do not scrub or use excessive force.              Wash hands with soap and water before and after dressing changes.              Prior to applying a clean dressing, cleanse wound with normal saline,               wound cleanser, or mild soap and water.               Ask the physician or nurse before getting the wound(s) wet in a shower                      Wound Care Notes:  Cardiology: Dr Scott   S/P CABG                                 Orders for this week:  2025              Left Lower Leg : Wash with soap and water, pat dry.   Stimulen powder to wound bed.  Apply saline damp hydraferra blue to depth and cut to fit   Cover with Large agile and tegaderm secured with mastisol.  Wrap with double Ace from ankle to knee.   Leave in place for 1 week  Dispense 30 day quantity when ordering supplies.  Plan:          Follow Up Instructions:  1 week   Primary Wound Care Provider: Carie Bowman CNP   Call  for any questions or concerns.  Central [phone] for imaging and lab work

## 2025-01-27 ENCOUNTER — TELEPHONE (OUTPATIENT)
Dept: CARDIOLOGY CLINIC | Age: 70
End: 2025-01-27

## 2025-01-27 NOTE — TELEPHONE ENCOUNTER
Cardiac rehab ending service. Patient only attended 7 session but has been unable to come due to leg wound.

## 2025-01-29 ENCOUNTER — HOSPITAL ENCOUNTER (OUTPATIENT)
Dept: WOUND CARE | Age: 70
Discharge: HOME | End: 2025-01-29
Attending: NURSE PRACTITIONER
Payer: COMMERCIAL

## 2025-01-29 VITALS
HEART RATE: 65 BPM | RESPIRATION RATE: 20 BRPM | SYSTOLIC BLOOD PRESSURE: 136 MMHG | TEMPERATURE: 98 F | DIASTOLIC BLOOD PRESSURE: 74 MMHG

## 2025-01-29 DIAGNOSIS — R73.03 PREDIABETES: ICD-10-CM

## 2025-01-29 DIAGNOSIS — R60.0 BILATERAL LEG EDEMA: Primary | ICD-10-CM

## 2025-01-29 DIAGNOSIS — L97.922 SKIN ULCER OF LEFT LOWER LEG WITH FAT LAYER EXPOSED (HCC): ICD-10-CM

## 2025-01-29 DIAGNOSIS — T14.8XXA NON-HEALING NON-SURGICAL WOUND: ICD-10-CM

## 2025-01-29 DIAGNOSIS — E66.01 OBESITY, CLASS III, BMI 40-49.9 (MORBID OBESITY): ICD-10-CM

## 2025-01-29 DIAGNOSIS — T81.89XD NONHEALING SURGICAL WOUND, SUBSEQUENT ENCOUNTER: ICD-10-CM

## 2025-01-29 PROCEDURE — 11042 DBRDMT SUBQ TIS 1ST 20SQCM/<: CPT

## 2025-01-29 RX ORDER — TRIAMCINOLONE ACETONIDE 1 MG/G
OINTMENT TOPICAL ONCE
OUTPATIENT
Start: 2025-01-29 | End: 2025-01-29

## 2025-01-29 RX ORDER — BETAMETHASONE DIPROPIONATE 0.5 MG/G
CREAM TOPICAL ONCE
OUTPATIENT
Start: 2025-01-29 | End: 2025-01-29

## 2025-01-29 RX ORDER — LIDOCAINE HYDROCHLORIDE 20 MG/ML
JELLY TOPICAL ONCE
OUTPATIENT
Start: 2025-01-29 | End: 2025-01-29

## 2025-01-29 RX ORDER — LIDOCAINE HYDROCHLORIDE 40 MG/ML
SOLUTION TOPICAL ONCE
OUTPATIENT
Start: 2025-01-29 | End: 2025-01-29

## 2025-01-29 RX ORDER — NEOMYCIN/BACITRACIN/POLYMYXINB 3.5-400-5K
OINTMENT (GRAM) TOPICAL ONCE
OUTPATIENT
Start: 2025-01-29 | End: 2025-01-29

## 2025-01-29 RX ORDER — CLOBETASOL PROPIONATE 0.5 MG/G
OINTMENT TOPICAL ONCE
OUTPATIENT
Start: 2025-01-29 | End: 2025-01-29

## 2025-01-29 RX ORDER — SILVER SULFADIAZINE 10 MG/G
CREAM TOPICAL ONCE
OUTPATIENT
Start: 2025-01-29 | End: 2025-01-29

## 2025-01-29 RX ORDER — LIDOCAINE 50 MG/G
OINTMENT TOPICAL ONCE
OUTPATIENT
Start: 2025-01-29 | End: 2025-01-29

## 2025-01-29 RX ORDER — LIDOCAINE 40 MG/G
CREAM TOPICAL ONCE
OUTPATIENT
Start: 2025-01-29 | End: 2025-01-29

## 2025-01-29 RX ORDER — GENTAMICIN SULFATE 1 MG/G
OINTMENT TOPICAL ONCE
OUTPATIENT
Start: 2025-01-29 | End: 2025-01-29

## 2025-01-29 RX ORDER — GINSENG 100 MG
CAPSULE ORAL ONCE
OUTPATIENT
Start: 2025-01-29 | End: 2025-01-29

## 2025-01-29 RX ORDER — MUPIROCIN 20 MG/G
OINTMENT TOPICAL ONCE
OUTPATIENT
Start: 2025-01-29 | End: 2025-01-29

## 2025-01-29 RX ORDER — BACITRACIN ZINC AND POLYMYXIN B SULFATE 500; 1000 [USP'U]/G; [USP'U]/G
OINTMENT TOPICAL ONCE
OUTPATIENT
Start: 2025-01-29 | End: 2025-01-29

## 2025-01-29 RX ORDER — SODIUM CHLOR/HYPOCHLOROUS ACID 0.033 %
SOLUTION, IRRIGATION IRRIGATION ONCE
OUTPATIENT
Start: 2025-01-29 | End: 2025-01-29

## 2025-01-29 NOTE — PATIENT INSTRUCTIONS
PHYSICIAN ORDERS AND DISCHARGE INSTRUCTIONS     Wound cleansing:               Do not scrub or use excessive force.              Wash hands with soap and water before and after dressing changes.              Prior to applying a clean dressing, cleanse wound with normal saline,               wound cleanser, or mild soap and water.               Ask the physician or nurse before getting the wound(s) wet in a shower                      Wound Care Notes:  Cardiology: Dr Scott   S/P CABG                                 Orders for this week:  2025              Left Lower Leg : Wash with soap and water, pat dry.   Stimulen powder to wound bed.  Apply saline damp hydraferra blue to depth and cut to fit   Cover with Large agile and tegaderm secured with mastisol.  Wrap with double Ace from ankle to knee.   Leave in place for 1 week  Dispense 30 day quantity when ordering supplies.  Plan:          Follow Up Instructions:  1 week   Primary Wound Care Provider: Carie Bowman CNP   Call  for any questions or concerns.  Central [phone] for imaging and lab work   No

## 2025-01-31 NOTE — PROGRESS NOTES
place  [] Referral to other healthcare providers  [x] Review of  prior external notes  [x] Review of the results of each unique test  [] Communication with other healthcare providers  [] Discussion regarding hospitalization  [] Discussed Pain management  [] Will continue prescribe patient opioid pain medication at this time. Patient understands all side effects and contraindications of opioids. No indication of abuse or seeking behavior. OARRS report checked at this time. We will continue to monitor.    [] Incontinence management and skin care  [] Referral to Home Health for assistance with wound care in the home  [] Independently interpreting results   [] Communicating results to the patient/family/caregiver  [x] Coordinate of care   [x] Counseling and education to patient/family/caregiver    Adjunction therapy:   HBO:  [x] Not indicated  [] Has had documented 30 days of care   [] Hyperbaric Oxygen Therapy consult   [] HBO influence to healing with good results  [] Tolerating HBO without difficulty    Follow up scheduled:  [x] One week  [] Two weeks  [] 61 days  [] As needed  [] Discharged     Discussed:  [] Diabetic management  [x] Edema management  [] Smoking cessation  [] Weight management  [] Pressure reduction  [] Incontinence management and skin care  [] Pain Management  [x] Local wound care  [x] Infection Prevention  [x] S&S worsening wound status and symptoms that require further evaluation per PCP and/or emergency department  [] Use, action and side effects of medication  [] Bleeding precautions    Nurse visit at the wound clinic: [x] Yes []  No    [] The patient will need a nurse visit at the wound clinic for additional dressing change at this time related to:     Frequency of nursing visit needed:  [x] PRN 1-2 times weekly for excessive drainage, loose or dislodged dressing or wound pain or S&S infection that further assessment required  []  One time per week  []  Two times a week  [] Three times

## 2025-02-05 ENCOUNTER — HOSPITAL ENCOUNTER (OUTPATIENT)
Dept: WOUND CARE | Age: 70
Discharge: HOME | End: 2025-02-05
Attending: NURSE PRACTITIONER
Payer: COMMERCIAL

## 2025-02-05 VITALS
HEART RATE: 68 BPM | RESPIRATION RATE: 18 BRPM | SYSTOLIC BLOOD PRESSURE: 148 MMHG | TEMPERATURE: 97.3 F | DIASTOLIC BLOOD PRESSURE: 84 MMHG

## 2025-02-05 DIAGNOSIS — E66.01 OBESITY, CLASS III, BMI 40-49.9 (MORBID OBESITY): ICD-10-CM

## 2025-02-05 DIAGNOSIS — T81.89XD NONHEALING SURGICAL WOUND, SUBSEQUENT ENCOUNTER: ICD-10-CM

## 2025-02-05 DIAGNOSIS — L97.922 SKIN ULCER OF LEFT LOWER LEG WITH FAT LAYER EXPOSED (HCC): ICD-10-CM

## 2025-02-05 DIAGNOSIS — R60.0 BILATERAL LEG EDEMA: Primary | ICD-10-CM

## 2025-02-05 DIAGNOSIS — R73.03 PREDIABETES: ICD-10-CM

## 2025-02-05 PROCEDURE — 17250 CHEM CAUT OF GRANLTJ TISSUE: CPT

## 2025-02-05 PROCEDURE — 17250 CHEM CAUT OF GRANLTJ TISSUE: CPT | Performed by: NURSE PRACTITIONER

## 2025-02-05 RX ORDER — LIDOCAINE 40 MG/G
CREAM TOPICAL PRN
OUTPATIENT
Start: 2025-02-05

## 2025-02-05 RX ORDER — MUPIROCIN 20 MG/G
OINTMENT TOPICAL ONCE
Status: CANCELLED | OUTPATIENT
Start: 2025-02-05 | End: 2025-02-05

## 2025-02-05 RX ORDER — LIDOCAINE 50 MG/G
OINTMENT TOPICAL ONCE
Status: CANCELLED | OUTPATIENT
Start: 2025-02-05 | End: 2025-02-05

## 2025-02-05 RX ORDER — BETAMETHASONE DIPROPIONATE 0.5 MG/G
CREAM TOPICAL ONCE
Status: CANCELLED | OUTPATIENT
Start: 2025-02-05 | End: 2025-02-05

## 2025-02-05 RX ORDER — CLOBETASOL PROPIONATE 0.5 MG/G
OINTMENT TOPICAL ONCE
Status: CANCELLED | OUTPATIENT
Start: 2025-02-05 | End: 2025-02-05

## 2025-02-05 RX ORDER — GINSENG 100 MG
CAPSULE ORAL ONCE
Status: CANCELLED | OUTPATIENT
Start: 2025-02-05 | End: 2025-02-05

## 2025-02-05 RX ORDER — SILVER SULFADIAZINE 10 MG/G
CREAM TOPICAL ONCE
Status: CANCELLED | OUTPATIENT
Start: 2025-02-05 | End: 2025-02-05

## 2025-02-05 RX ORDER — SODIUM CHLOR/HYPOCHLOROUS ACID 0.033 %
SOLUTION, IRRIGATION IRRIGATION ONCE
Status: CANCELLED | OUTPATIENT
Start: 2025-02-05 | End: 2025-02-05

## 2025-02-05 RX ORDER — SILVER SULFADIAZINE 10 MG/G
CREAM TOPICAL PRN
OUTPATIENT
Start: 2025-02-05

## 2025-02-05 RX ORDER — BETAMETHASONE DIPROPIONATE 0.5 MG/G
CREAM TOPICAL PRN
OUTPATIENT
Start: 2025-02-05

## 2025-02-05 RX ORDER — NEOMYCIN/BACITRACIN/POLYMYXINB 3.5-400-5K
OINTMENT (GRAM) TOPICAL PRN
OUTPATIENT
Start: 2025-02-05

## 2025-02-05 RX ORDER — GENTAMICIN SULFATE 1 MG/G
OINTMENT TOPICAL PRN
OUTPATIENT
Start: 2025-02-05

## 2025-02-05 RX ORDER — NEOMYCIN/BACITRACIN/POLYMYXINB 3.5-400-5K
OINTMENT (GRAM) TOPICAL ONCE
Status: CANCELLED | OUTPATIENT
Start: 2025-02-05 | End: 2025-02-05

## 2025-02-05 RX ORDER — LIDOCAINE HYDROCHLORIDE 40 MG/ML
SOLUTION TOPICAL PRN
OUTPATIENT
Start: 2025-02-05

## 2025-02-05 RX ORDER — LIDOCAINE HYDROCHLORIDE 40 MG/ML
SOLUTION TOPICAL ONCE
Status: CANCELLED | OUTPATIENT
Start: 2025-02-05 | End: 2025-02-05

## 2025-02-05 RX ORDER — BACITRACIN ZINC AND POLYMYXIN B SULFATE 500; 1000 [USP'U]/G; [USP'U]/G
OINTMENT TOPICAL ONCE
Status: CANCELLED | OUTPATIENT
Start: 2025-02-05 | End: 2025-02-05

## 2025-02-05 RX ORDER — LIDOCAINE HYDROCHLORIDE 20 MG/ML
JELLY TOPICAL PRN
OUTPATIENT
Start: 2025-02-05

## 2025-02-05 RX ORDER — SODIUM CHLOR/HYPOCHLOROUS ACID 0.033 %
SOLUTION, IRRIGATION IRRIGATION PRN
OUTPATIENT
Start: 2025-02-05

## 2025-02-05 RX ORDER — TRIAMCINOLONE ACETONIDE 1 MG/G
OINTMENT TOPICAL PRN
OUTPATIENT
Start: 2025-02-05

## 2025-02-05 RX ORDER — GENTAMICIN SULFATE 1 MG/G
OINTMENT TOPICAL ONCE
Status: CANCELLED | OUTPATIENT
Start: 2025-02-05 | End: 2025-02-05

## 2025-02-05 RX ORDER — BACITRACIN ZINC AND POLYMYXIN B SULFATE 500; 1000 [USP'U]/G; [USP'U]/G
OINTMENT TOPICAL PRN
OUTPATIENT
Start: 2025-02-05

## 2025-02-05 RX ORDER — LIDOCAINE HYDROCHLORIDE 20 MG/ML
JELLY TOPICAL ONCE
Status: CANCELLED | OUTPATIENT
Start: 2025-02-05 | End: 2025-02-05

## 2025-02-05 RX ORDER — GINSENG 100 MG
CAPSULE ORAL PRN
OUTPATIENT
Start: 2025-02-05

## 2025-02-05 RX ORDER — CLOBETASOL PROPIONATE 0.5 MG/G
OINTMENT TOPICAL PRN
OUTPATIENT
Start: 2025-02-05

## 2025-02-05 RX ORDER — LIDOCAINE 40 MG/G
CREAM TOPICAL ONCE
Status: CANCELLED | OUTPATIENT
Start: 2025-02-05 | End: 2025-02-05

## 2025-02-05 RX ORDER — MUPIROCIN 20 MG/G
OINTMENT TOPICAL PRN
OUTPATIENT
Start: 2025-02-05

## 2025-02-05 RX ORDER — LIDOCAINE 50 MG/G
OINTMENT TOPICAL PRN
OUTPATIENT
Start: 2025-02-05

## 2025-02-05 RX ORDER — TRIAMCINOLONE ACETONIDE 1 MG/G
OINTMENT TOPICAL ONCE
Status: CANCELLED | OUTPATIENT
Start: 2025-02-05 | End: 2025-02-05

## 2025-02-05 NOTE — PATIENT INSTRUCTIONS
PHYSICIAN ORDERS AND DISCHARGE INSTRUCTIONS     Wound cleansing:               Do not scrub or use excessive force.              Wash hands with soap and water before and after dressing changes.              Prior to applying a clean dressing, cleanse wound with normal saline,               wound cleanser, or mild soap and water.               Ask the physician or nurse before getting the wound(s) wet in a shower                      Wound Care Notes:  Cardiology: Dr Scott   S/P CABG                                 Orders for this week:  2025              Left Lower Leg : Wash with soap and water, pat dry.   Stimulen powder to wound bed.  Apply saline damp hydraferra blue to depth and cut to fit   Cover with Large agile and tegaderm secured with mastisol.  Wrap with double Ace from ankle to knee.   Leave in place for 1 week  Dispense 30 day quantity when ordering supplies.  Plan:          Follow Up Instructions:  1 week   Primary Wound Care Provider: Carie Bowman CNP   Call  for any questions or concerns.  Central [phone] for imaging and lab work

## 2025-02-05 NOTE — PROGRESS NOTES
negative for anorexia, chills, fatigue, fevers, malaise, and sweats  Respiratory: negative for cough, hemoptysis, pleurisy/chest pain, sputum, and stridor  Cardiovascular: negative for chest pain, chest pressure/discomfort, exertional chest pressure/discomfort, near-syncope, orthopnea, palpitations, paroxysmal nocturnal dyspnea, syncope, and tachypnea  Integument/breast: positive for skin lesion(s)        Objective:      BP (!) 148/84   Pulse 68   Temp 97.3 °F (36.3 °C) (Temporal)   Resp 18     BP Readings from Last 3 Encounters:   02/05/25 (!) 148/84   01/29/25 136/74   01/22/25 (!) 159/77        PHYSICAL EXAM  General Appearance:   Alert, cooperative, no distress   Head:   Normocephalic, without obvious abnormality, atraumatic    Respiratory:   Equal chest rise, respirations unlabored, no wheezing   Neurologic:  Nonfocal, strength & sensation grossly intact   Psychiatric: Oriented x3, grossly appropriate judgement and insight      Dermatologic exam: Visual inspection of the periwound reveals the skin to be normal in turgor and texture  Wound exam: see wound description below in procedure note      Assessment:       Mark Bourgeois  appears to have a non-healing wound of the left leg. The etiology of the wound is felt to be non-healing surgical. There are multiple complicating factors including edema, obesity, and prediabetes .  A comprehensive wound management program would be helpful to heal this wound. Assessments completed include fall risk and nutritional, functional,and psychological status.  At this time appropriate care would include: periodic debridement and wound care as below.     Problem List Items Addressed This Visit          Other    Obesity, Class III, BMI 40-49.9 (morbid obesity)    Relevant Orders    Initiate Outpatient Wound Care Protocol    Prediabetes    Relevant Orders    Initiate Outpatient Wound Care Protocol    Bilateral leg edema - Primary    Relevant Orders    Initiate Outpatient Wound

## 2025-02-12 ENCOUNTER — HOSPITAL ENCOUNTER (OUTPATIENT)
Dept: WOUND CARE | Age: 70
Discharge: HOME | End: 2025-02-12
Attending: NURSE PRACTITIONER
Payer: COMMERCIAL

## 2025-02-12 VITALS
DIASTOLIC BLOOD PRESSURE: 83 MMHG | SYSTOLIC BLOOD PRESSURE: 147 MMHG | TEMPERATURE: 98.3 F | RESPIRATION RATE: 18 BRPM | HEART RATE: 76 BPM

## 2025-02-12 DIAGNOSIS — R60.0 BILATERAL LEG EDEMA: ICD-10-CM

## 2025-02-12 DIAGNOSIS — E66.01 OBESITY, CLASS III, BMI 40-49.9 (MORBID OBESITY): ICD-10-CM

## 2025-02-12 DIAGNOSIS — R73.03 PREDIABETES: ICD-10-CM

## 2025-02-12 DIAGNOSIS — T81.89XD NONHEALING SURGICAL WOUND, SUBSEQUENT ENCOUNTER: ICD-10-CM

## 2025-02-12 DIAGNOSIS — L97.922 SKIN ULCER OF LEFT LOWER LEG WITH FAT LAYER EXPOSED (HCC): Primary | ICD-10-CM

## 2025-02-12 PROCEDURE — 11042 DBRDMT SUBQ TIS 1ST 20SQCM/<: CPT

## 2025-02-12 RX ORDER — LIDOCAINE HYDROCHLORIDE 40 MG/ML
SOLUTION TOPICAL PRN
OUTPATIENT
Start: 2025-02-12

## 2025-02-12 RX ORDER — CLOBETASOL PROPIONATE 0.5 MG/G
OINTMENT TOPICAL PRN
OUTPATIENT
Start: 2025-02-12

## 2025-02-12 RX ORDER — LIDOCAINE HYDROCHLORIDE 20 MG/ML
JELLY TOPICAL PRN
OUTPATIENT
Start: 2025-02-12

## 2025-02-12 RX ORDER — MUPIROCIN 20 MG/G
OINTMENT TOPICAL PRN
OUTPATIENT
Start: 2025-02-12

## 2025-02-12 RX ORDER — SODIUM CHLOR/HYPOCHLOROUS ACID 0.033 %
SOLUTION, IRRIGATION IRRIGATION PRN
OUTPATIENT
Start: 2025-02-12

## 2025-02-12 RX ORDER — NEOMYCIN/BACITRACIN/POLYMYXINB 3.5-400-5K
OINTMENT (GRAM) TOPICAL PRN
OUTPATIENT
Start: 2025-02-12

## 2025-02-12 RX ORDER — BACITRACIN ZINC AND POLYMYXIN B SULFATE 500; 1000 [USP'U]/G; [USP'U]/G
OINTMENT TOPICAL PRN
OUTPATIENT
Start: 2025-02-12

## 2025-02-12 RX ORDER — BETAMETHASONE DIPROPIONATE 0.5 MG/G
CREAM TOPICAL PRN
OUTPATIENT
Start: 2025-02-12

## 2025-02-12 RX ORDER — LIDOCAINE 50 MG/G
OINTMENT TOPICAL PRN
OUTPATIENT
Start: 2025-02-12

## 2025-02-12 RX ORDER — LIDOCAINE 40 MG/G
CREAM TOPICAL PRN
OUTPATIENT
Start: 2025-02-12

## 2025-02-12 RX ORDER — SILVER SULFADIAZINE 10 MG/G
CREAM TOPICAL PRN
OUTPATIENT
Start: 2025-02-12

## 2025-02-12 RX ORDER — GENTAMICIN SULFATE 1 MG/G
OINTMENT TOPICAL PRN
OUTPATIENT
Start: 2025-02-12

## 2025-02-12 RX ORDER — GINSENG 100 MG
CAPSULE ORAL PRN
OUTPATIENT
Start: 2025-02-12

## 2025-02-12 RX ORDER — TRIAMCINOLONE ACETONIDE 1 MG/G
OINTMENT TOPICAL PRN
OUTPATIENT
Start: 2025-02-12

## 2025-02-12 ASSESSMENT — PAIN SCALES - GENERAL: PAINLEVEL_OUTOF10: 0

## 2025-02-12 NOTE — PATIENT INSTRUCTIONS
PHYSICIAN ORDERS AND DISCHARGE INSTRUCTIONS     Wound cleansing:               Do not scrub or use excessive force.              Wash hands with soap and water before and after dressing changes.              Prior to applying a clean dressing, cleanse wound with normal saline,               wound cleanser, or mild soap and water.               Ask the physician or nurse before getting the wound(s) wet in a shower                      Wound Care Notes:  Cardiology: Dr Scott   S/P CABG                                 Orders for this week:  2025              Left Lower Leg : Wash with soap and water, pat dry.   Stimulen powder to wound bed.  Apply saline damp hydraferra blue to depth and cut to fit   Cover with Large agile and tegaderm secured with mastisol.  Wrap with double Ace from ankle to knee.   Leave in place for 1 week  Dispense 30 day quantity when ordering supplies.  Plan:          Follow Up Instructions:  1 week   Primary Wound Care Provider: Carie Bowman CNP   Call  for any questions or concerns.  Central [phone] for imaging and lab work

## 2025-02-12 NOTE — PROGRESS NOTES
Wound Care Center Progress Visit      Mark Bourgeois  AGE: 69 y.o.   GENDER: male  : 1955  EPISODE DATE:  2025   Referred by: Hyacinth Garcia MD     Subjective:     CHIEF COMPLAINT  WOUND   Problem List Items Addressed This Visit          Other    Obesity, Class III, BMI 40-49.9 (morbid obesity)    Relevant Orders    Initiate Outpatient Wound Care Protocol    Initiate Oxygen Therapy Protocol    Prediabetes    Relevant Orders    Initiate Outpatient Wound Care Protocol    Initiate Oxygen Therapy Protocol    Bilateral leg edema    Relevant Orders    Initiate Outpatient Wound Care Protocol    Initiate Oxygen Therapy Protocol    Skin ulcer of left lower leg with fat layer exposed (HCC) - Primary    Relevant Orders    Initiate Outpatient Wound Care Protocol    Initiate Oxygen Therapy Protocol    Nonhealing surgical wound, subsequent encounter    Relevant Orders    Initiate Outpatient Wound Care Protocol    Initiate Oxygen Therapy Protocol       Chief Complaint   Patient presents with    Wound Check        HISTORY of PRESENT ILLNESS      Mark Bourgeois is a 69 y.o. 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 The condition is of moderate severity. The ulcer has been present since .  The underlying cause is thought to be nonhealing surgical post CABG 24. Most recently  s/p LEFT LOWER EXTREMITY EXPLORATION WITH WASHOUT; WOUND VAC PLACEMENT  on 24. The patients care to date has included evaluation per CV surgeon with referral to the wound clinic. The patient has significant underlying medical conditions as below. Hyacinth Garcia MD . Advanced wound care modalities established with initiation of care at the wound clinic.The patient has significant underlying medical conditions as below. Hyacinth Garcia MD .    Living Situation  [x] Home [] SNF []  Assisted living  [] Other (ie rehab, etc.) []

## 2025-02-19 ENCOUNTER — HOSPITAL ENCOUNTER (OUTPATIENT)
Dept: WOUND CARE | Age: 70
Discharge: HOME | End: 2025-02-19
Attending: NURSE PRACTITIONER
Payer: COMMERCIAL

## 2025-02-19 VITALS — TEMPERATURE: 98.5 F | RESPIRATION RATE: 20 BRPM | SYSTOLIC BLOOD PRESSURE: 155 MMHG | DIASTOLIC BLOOD PRESSURE: 70 MMHG

## 2025-02-19 DIAGNOSIS — T81.89XD NONHEALING SURGICAL WOUND, SUBSEQUENT ENCOUNTER: ICD-10-CM

## 2025-02-19 DIAGNOSIS — T14.8XXA NON-HEALING NON-SURGICAL WOUND: ICD-10-CM

## 2025-02-19 DIAGNOSIS — R73.03 PREDIABETES: ICD-10-CM

## 2025-02-19 DIAGNOSIS — E66.01 OBESITY, CLASS III, BMI 40-49.9 (MORBID OBESITY): ICD-10-CM

## 2025-02-19 DIAGNOSIS — L97.922 SKIN ULCER OF LEFT LOWER LEG WITH FAT LAYER EXPOSED (HCC): ICD-10-CM

## 2025-02-19 DIAGNOSIS — R60.0 BILATERAL LEG EDEMA: Primary | ICD-10-CM

## 2025-02-19 PROCEDURE — 11042 DBRDMT SUBQ TIS 1ST 20SQCM/<: CPT

## 2025-02-19 PROCEDURE — 11042 DBRDMT SUBQ TIS 1ST 20SQCM/<: CPT | Performed by: NURSE PRACTITIONER

## 2025-02-19 RX ORDER — LIDOCAINE HYDROCHLORIDE 20 MG/ML
JELLY TOPICAL PRN
OUTPATIENT
Start: 2025-02-19

## 2025-02-19 RX ORDER — GENTAMICIN SULFATE 1 MG/G
OINTMENT TOPICAL PRN
OUTPATIENT
Start: 2025-02-19

## 2025-02-19 RX ORDER — LIDOCAINE 40 MG/G
CREAM TOPICAL PRN
OUTPATIENT
Start: 2025-02-19

## 2025-02-19 RX ORDER — CLOBETASOL PROPIONATE 0.5 MG/G
OINTMENT TOPICAL PRN
OUTPATIENT
Start: 2025-02-19

## 2025-02-19 RX ORDER — BACITRACIN ZINC AND POLYMYXIN B SULFATE 500; 1000 [USP'U]/G; [USP'U]/G
OINTMENT TOPICAL PRN
OUTPATIENT
Start: 2025-02-19

## 2025-02-19 RX ORDER — NEOMYCIN/BACITRACIN/POLYMYXINB 3.5-400-5K
OINTMENT (GRAM) TOPICAL PRN
OUTPATIENT
Start: 2025-02-19

## 2025-02-19 RX ORDER — BETAMETHASONE DIPROPIONATE 0.5 MG/G
CREAM TOPICAL PRN
OUTPATIENT
Start: 2025-02-19

## 2025-02-19 RX ORDER — TRIAMCINOLONE ACETONIDE 1 MG/G
OINTMENT TOPICAL PRN
OUTPATIENT
Start: 2025-02-19

## 2025-02-19 RX ORDER — SILVER SULFADIAZINE 10 MG/G
CREAM TOPICAL PRN
OUTPATIENT
Start: 2025-02-19

## 2025-02-19 RX ORDER — LIDOCAINE HYDROCHLORIDE 40 MG/ML
SOLUTION TOPICAL PRN
OUTPATIENT
Start: 2025-02-19

## 2025-02-19 RX ORDER — SODIUM CHLOR/HYPOCHLOROUS ACID 0.033 %
SOLUTION, IRRIGATION IRRIGATION PRN
OUTPATIENT
Start: 2025-02-19

## 2025-02-19 RX ORDER — GINSENG 100 MG
CAPSULE ORAL PRN
OUTPATIENT
Start: 2025-02-19

## 2025-02-19 RX ORDER — MUPIROCIN 20 MG/G
OINTMENT TOPICAL PRN
OUTPATIENT
Start: 2025-02-19

## 2025-02-19 RX ORDER — LIDOCAINE 50 MG/G
OINTMENT TOPICAL PRN
OUTPATIENT
Start: 2025-02-19

## 2025-02-19 NOTE — PROGRESS NOTES
Wound Care Center Progress Visit      Mark Bourgeois  AGE: 69 y.o.   GENDER: male  : 1955  EPISODE DATE:  2025   Referred by: Hyacinth Garcia MD     Subjective:     CHIEF COMPLAINT  WOUND   Problem List Items Addressed This Visit          Other    Obesity, Class III, BMI 40-49.9 (morbid obesity)    Relevant Orders    Initiate Outpatient Wound Care Protocol    Prediabetes    Relevant Orders    Initiate Outpatient Wound Care Protocol    Bilateral leg edema - Primary    Relevant Orders    Initiate Outpatient Wound Care Protocol    Skin ulcer of left lower leg with fat layer exposed (HCC)    Relevant Orders    Initiate Outpatient Wound Care Protocol    Non-healing non-surgical wound    Nonhealing surgical wound, subsequent encounter    Relevant Orders    Initiate Outpatient Wound Care Protocol       Chief Complaint   Patient presents with    Wound Check        HISTORY of PRESENT ILLNESS      Mark Bourgeois is a 69 y.o. 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 The condition is of moderate severity. The ulcer has been present since .  The underlying cause is thought to be nonhealing surgical post CABG 24. most recently  s/p left lower extremity exploration with washout; wound vac placement  on 24. The patients care to date has included evaluation per CV surgeon with referral to the wound clinic. The patient has significant underlying medical conditions as below. Hyacinth Garcia MD . Advanced wound care modalities established with initiation of care at the wound clinic.The patient has significant underlying medical conditions as below. Hyacinth Garcia MD .    Living Situation  [x] Home [] SNF []  Assisted living  [] Other (ie rehab, etc.) [] Home Health  []  Transportation    Wound Pain Timing/Severity: waxing and waning, mild  Quality of pain: dull, aching, tender  Severity of pain:  3 /

## 2025-02-19 NOTE — PATIENT INSTRUCTIONS
PHYSICIAN ORDERS AND DISCHARGE INSTRUCTIONS     Wound cleansing:               Do not scrub or use excessive force.              Wash hands with soap and water before and after dressing changes.              Prior to applying a clean dressing, cleanse wound with normal saline,               wound cleanser, or mild soap and water.               Ask the physician or nurse before getting the wound(s) wet in a shower                      Wound Care Notes:  Cardiology: Dr Scott   S/P CABG                                 Orders for this week:  2025              Left Lower Leg : Wash with soap and water, pat dry.   Stimulen powder to wound bed.  Apply dry damp hydraferra blue to periwound  secured with steristrips   Stimulen powder and Liquid silver nitrate damp hydraferra blue to wound bed secured with steristrips   Cover with Large agile and tegaderm secured with mastisol.  Wrap with double Ace from ankle to knee.   Leave in place for 1 week  Dispense 30 day quantity when ordering supplies.  Plan:          Follow Up Instructions:  1 week   Primary Wound Care Provider: Carie Bowman CNP   Call  for any questions or concerns.  Central [phone] for imaging and lab work

## 2025-02-26 ENCOUNTER — HOSPITAL ENCOUNTER (OUTPATIENT)
Dept: WOUND CARE | Age: 70
Discharge: HOME | End: 2025-02-26
Attending: NURSE PRACTITIONER
Payer: COMMERCIAL

## 2025-02-26 VITALS
SYSTOLIC BLOOD PRESSURE: 146 MMHG | TEMPERATURE: 97.1 F | DIASTOLIC BLOOD PRESSURE: 72 MMHG | HEART RATE: 74 BPM | RESPIRATION RATE: 20 BRPM

## 2025-02-26 DIAGNOSIS — L97.922 SKIN ULCER OF LEFT LOWER LEG WITH FAT LAYER EXPOSED (HCC): ICD-10-CM

## 2025-02-26 DIAGNOSIS — R73.03 PREDIABETES: ICD-10-CM

## 2025-02-26 DIAGNOSIS — R60.0 BILATERAL LEG EDEMA: Primary | ICD-10-CM

## 2025-02-26 DIAGNOSIS — T81.89XD NONHEALING SURGICAL WOUND, SUBSEQUENT ENCOUNTER: ICD-10-CM

## 2025-02-26 DIAGNOSIS — E66.01 OBESITY, CLASS III, BMI 40-49.9 (MORBID OBESITY): ICD-10-CM

## 2025-02-26 PROBLEM — T14.8XXA NON-HEALING NON-SURGICAL WOUND: Status: RESOLVED | Noted: 2024-12-06 | Resolved: 2025-02-26

## 2025-02-26 PROCEDURE — 11042 DBRDMT SUBQ TIS 1ST 20SQCM/<: CPT

## 2025-02-26 PROCEDURE — 11042 DBRDMT SUBQ TIS 1ST 20SQCM/<: CPT | Performed by: NURSE PRACTITIONER

## 2025-02-26 RX ORDER — MUPIROCIN 20 MG/G
OINTMENT TOPICAL PRN
OUTPATIENT
Start: 2025-02-26

## 2025-02-26 RX ORDER — GINSENG 100 MG
CAPSULE ORAL PRN
OUTPATIENT
Start: 2025-02-26

## 2025-02-26 RX ORDER — TRIAMCINOLONE ACETONIDE 1 MG/G
OINTMENT TOPICAL PRN
OUTPATIENT
Start: 2025-02-26

## 2025-02-26 RX ORDER — LIDOCAINE HYDROCHLORIDE 20 MG/ML
JELLY TOPICAL PRN
OUTPATIENT
Start: 2025-02-26

## 2025-02-26 RX ORDER — BACITRACIN ZINC AND POLYMYXIN B SULFATE 500; 1000 [USP'U]/G; [USP'U]/G
OINTMENT TOPICAL PRN
OUTPATIENT
Start: 2025-02-26

## 2025-02-26 RX ORDER — LIDOCAINE 40 MG/G
CREAM TOPICAL PRN
OUTPATIENT
Start: 2025-02-26

## 2025-02-26 RX ORDER — SILVER SULFADIAZINE 10 MG/G
CREAM TOPICAL PRN
OUTPATIENT
Start: 2025-02-26

## 2025-02-26 RX ORDER — SODIUM CHLOR/HYPOCHLOROUS ACID 0.033 %
SOLUTION, IRRIGATION IRRIGATION PRN
OUTPATIENT
Start: 2025-02-26

## 2025-02-26 RX ORDER — CLOBETASOL PROPIONATE 0.5 MG/G
OINTMENT TOPICAL PRN
OUTPATIENT
Start: 2025-02-26

## 2025-02-26 RX ORDER — LIDOCAINE 50 MG/G
OINTMENT TOPICAL PRN
OUTPATIENT
Start: 2025-02-26

## 2025-02-26 RX ORDER — GENTAMICIN SULFATE 1 MG/G
OINTMENT TOPICAL PRN
OUTPATIENT
Start: 2025-02-26

## 2025-02-26 RX ORDER — BETAMETHASONE DIPROPIONATE 0.5 MG/G
CREAM TOPICAL PRN
OUTPATIENT
Start: 2025-02-26

## 2025-02-26 RX ORDER — NEOMYCIN/BACITRACIN/POLYMYXINB 3.5-400-5K
OINTMENT (GRAM) TOPICAL PRN
OUTPATIENT
Start: 2025-02-26

## 2025-02-26 RX ORDER — LIDOCAINE HYDROCHLORIDE 40 MG/ML
SOLUTION TOPICAL PRN
OUTPATIENT
Start: 2025-02-26

## 2025-02-26 NOTE — PROGRESS NOTES
Wound Care Center Progress Visit      Mark Bourgeois  AGE: 69 y.o.   GENDER: male  : 1955  EPISODE DATE:  2025   Referred by: Hyacinth Garcia MD     Subjective:     CHIEF COMPLAINT  WOUND   Problem List Items Addressed This Visit          Other    Obesity, Class III, BMI 40-49.9 (morbid obesity)    Relevant Orders    Initiate Outpatient Wound Care Protocol    Prediabetes    Relevant Orders    Initiate Outpatient Wound Care Protocol    Bilateral leg edema - Primary    Relevant Orders    Initiate Outpatient Wound Care Protocol    Skin ulcer of left lower leg with fat layer exposed (HCC)    Relevant Orders    Initiate Outpatient Wound Care Protocol    Nonhealing surgical wound, subsequent encounter    Relevant Orders    Initiate Outpatient Wound Care Protocol       Chief Complaint   Patient presents with    Wound Check        HISTORY of PRESENT ILLNESS      Mark Bourgeois is a 69 y.o. 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 The condition is of moderate severity. The ulcer has been present since .  The underlying cause is thought to be nonhealing surgical post CABG 24. most recently  s/p left lower extremity exploration with washout; wound vac placement  on 24. The patients care to date has included evaluation per CV surgeon with referral to the wound clinic. The patient has significant underlying medical conditions as below. Hyacinth Garcia MD . Advanced wound care modalities established with initiation of care at the wound clinic.The patient has significant underlying medical conditions as below. Hyacinth Garcia MD .    Living Situation  [x] Home [] SNF []  Assisted living  [] Other (ie rehab, etc.) [] Home Health  []  Transportation    Wound Pain Timing/Severity: waxing and waning, mild  Quality of pain: dull, aching, tender  Severity of pain:  3 / 10   Modifying Factors: edema,

## 2025-02-26 NOTE — PATIENT INSTRUCTIONS
PHYSICIAN ORDERS AND DISCHARGE INSTRUCTIONS     Wound cleansing:               Do not scrub or use excessive force.              Wash hands with soap and water before and after dressing changes.              Prior to applying a clean dressing, cleanse wound with normal saline,               wound cleanser, or mild soap and water.               Ask the physician or nurse before getting the wound(s) wet in a shower                      Wound Care Notes:  Cardiology: Dr Scott   S/P CABG                                 Orders for this week:  2025              Left Lower Leg : Wash with soap and water, pat dry.   Stimulen powder to wound bed.  Apply dry damp hydraferra blue to periwound  secured with steristrips   Stimulen powder and Liquid silver nitrate damp hydraferra blue to wound bed secured with steristrips   Cover with Large agile and tegaderm secured with mastisol.  Wrap with double Ace from ankle to knee.   Leave in place for 1 week  Dispense 30 day quantity when ordering supplies.  Plan:          Follow Up Instructions:  1 week   Primary Wound Care Provider: Carie Bowman CNP   Call  for any questions or concerns.  Central [phone] for imaging and lab work

## 2025-03-05 ENCOUNTER — HOSPITAL ENCOUNTER (OUTPATIENT)
Dept: WOUND CARE | Age: 70
Discharge: HOME | End: 2025-03-05
Attending: NURSE PRACTITIONER
Payer: COMMERCIAL

## 2025-03-05 VITALS
SYSTOLIC BLOOD PRESSURE: 127 MMHG | DIASTOLIC BLOOD PRESSURE: 66 MMHG | HEART RATE: 71 BPM | RESPIRATION RATE: 18 BRPM | TEMPERATURE: 97.8 F

## 2025-03-05 DIAGNOSIS — E66.01 OBESITY, CLASS III, BMI 40-49.9 (MORBID OBESITY): ICD-10-CM

## 2025-03-05 DIAGNOSIS — T81.89XD NONHEALING SURGICAL WOUND, SUBSEQUENT ENCOUNTER: ICD-10-CM

## 2025-03-05 DIAGNOSIS — R73.03 PREDIABETES: ICD-10-CM

## 2025-03-05 DIAGNOSIS — R60.0 BILATERAL LEG EDEMA: Primary | ICD-10-CM

## 2025-03-05 DIAGNOSIS — L97.922 SKIN ULCER OF LEFT LOWER LEG WITH FAT LAYER EXPOSED (HCC): ICD-10-CM

## 2025-03-05 PROCEDURE — 11042 DBRDMT SUBQ TIS 1ST 20SQCM/<: CPT

## 2025-03-05 RX ORDER — SILVER SULFADIAZINE 10 MG/G
CREAM TOPICAL PRN
OUTPATIENT
Start: 2025-03-05

## 2025-03-05 RX ORDER — LIDOCAINE 50 MG/G
OINTMENT TOPICAL PRN
OUTPATIENT
Start: 2025-03-05

## 2025-03-05 RX ORDER — BACITRACIN ZINC AND POLYMYXIN B SULFATE 500; 1000 [USP'U]/G; [USP'U]/G
OINTMENT TOPICAL PRN
OUTPATIENT
Start: 2025-03-05

## 2025-03-05 RX ORDER — GINSENG 100 MG
CAPSULE ORAL PRN
OUTPATIENT
Start: 2025-03-05

## 2025-03-05 RX ORDER — LIDOCAINE 40 MG/G
CREAM TOPICAL PRN
OUTPATIENT
Start: 2025-03-05

## 2025-03-05 RX ORDER — CLOBETASOL PROPIONATE 0.5 MG/G
OINTMENT TOPICAL PRN
OUTPATIENT
Start: 2025-03-05

## 2025-03-05 RX ORDER — LIDOCAINE HYDROCHLORIDE 20 MG/ML
JELLY TOPICAL PRN
OUTPATIENT
Start: 2025-03-05

## 2025-03-05 RX ORDER — MUPIROCIN 20 MG/G
OINTMENT TOPICAL PRN
OUTPATIENT
Start: 2025-03-05

## 2025-03-05 RX ORDER — GENTAMICIN SULFATE 1 MG/G
OINTMENT TOPICAL PRN
OUTPATIENT
Start: 2025-03-05

## 2025-03-05 RX ORDER — NEOMYCIN/BACITRACIN/POLYMYXINB 3.5-400-5K
OINTMENT (GRAM) TOPICAL PRN
OUTPATIENT
Start: 2025-03-05

## 2025-03-05 RX ORDER — SODIUM CHLOR/HYPOCHLOROUS ACID 0.033 %
SOLUTION, IRRIGATION IRRIGATION PRN
OUTPATIENT
Start: 2025-03-05

## 2025-03-05 RX ORDER — TRIAMCINOLONE ACETONIDE 1 MG/G
OINTMENT TOPICAL PRN
OUTPATIENT
Start: 2025-03-05

## 2025-03-05 RX ORDER — LIDOCAINE HYDROCHLORIDE 40 MG/ML
SOLUTION TOPICAL PRN
OUTPATIENT
Start: 2025-03-05

## 2025-03-05 RX ORDER — BETAMETHASONE DIPROPIONATE 0.5 MG/G
CREAM TOPICAL PRN
OUTPATIENT
Start: 2025-03-05

## 2025-03-05 NOTE — PATIENT INSTRUCTIONS
PHYSICIAN ORDERS AND DISCHARGE INSTRUCTIONS     Wound cleansing:               Do not scrub or use excessive force.              Wash hands with soap and water before and after dressing changes.              Prior to applying a clean dressing, cleanse wound with normal saline,               wound cleanser, or mild soap and water.               Ask the physician or nurse before getting the wound(s) wet in a shower                      Wound Care Notes:  Cardiology: Dr Scott   S/P CABG                                 Orders for this week:  3/5/2025              Left Lower Leg : Wash with soap and water, pat dry.   Stimulen powder to wound bed.  Apply dry damp hydraferra blue to periwound  secured with steristrips   Stimulen powder and Liquid silver nitrate damp hydraferra blue to wound bed secured with steristrips   Cover with Large agile and tegaderm secured with mastisol.  Wrap with double Ace from ankle to knee.   Leave in place for 1 week  Dispense 30 day quantity when ordering supplies.  Plan:          Follow Up Instructions:  1 week   Primary Wound Care Provider: Carie Bowman CNP   Call  for any questions or concerns.  Central [phone] for imaging and lab work

## 2025-03-05 NOTE — PROGRESS NOTES
Wound Care Center Progress Visit      Mark Bourgeois  AGE: 69 y.o.   GENDER: male  : 1955  EPISODE DATE:  3/5/2025   Referred by: Hyacinth Garcia MD     Subjective:     CHIEF COMPLAINT  WOUND   Problem List Items Addressed This Visit          Other    Obesity, Class III, BMI 40-49.9 (morbid obesity)    Relevant Orders    Initiate Outpatient Wound Care Protocol    Prediabetes    Relevant Orders    Initiate Outpatient Wound Care Protocol    Bilateral leg edema - Primary    Relevant Orders    Initiate Outpatient Wound Care Protocol    Skin ulcer of left lower leg with fat layer exposed (HCC)    Relevant Orders    Initiate Outpatient Wound Care Protocol    Nonhealing surgical wound, subsequent encounter    Relevant Orders    Initiate Outpatient Wound Care Protocol       Chief Complaint   Patient presents with    Wound Check        HISTORY of PRESENT ILLNESS      Mark Bourgeois is a 69 y.o. 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 The condition is of moderate severity. The ulcer has been present since .  The underlying cause is thought to be nonhealing surgical post CABG 24. most recently  s/p left lower extremity exploration with washout; wound vac placement  on 24. The patients care to date has included evaluation per CV surgeon with referral to the wound clinic. The patient has significant underlying medical conditions as below. Hyacinth Garcia MD . Advanced wound care modalities established with initiation of care at the wound clinic.The patient has significant underlying medical conditions as below. Hyacinth Garcia MD .    Living Situation  [x] Home [] SNF []  Assisted living  [] Other (ie rehab, etc.) [] Home Health  []  Transportation    Wound Pain Timing/Severity: waxing and waning, mild  Quality of pain: dull, aching, tender  Severity of pain:  3 / 10   Modifying Factors: edema,

## 2025-03-06 ENCOUNTER — TELEPHONE (OUTPATIENT)
Dept: CARDIOTHORACIC SURGERY | Age: 70
End: 2025-03-06

## 2025-03-06 NOTE — TELEPHONE ENCOUNTER
Patient is scheduled for a post op, follow up would check on 3/11/2025    Patient called and said he is seeing the wound clinic still, the wound has not healed. You wanted him to return to office when Healed, does his appt need pushed out?       (Plan  Follow up with wound clinic as scheduled.   ACE bandage/dressing not removed at today's office visit.   RTO in 6 weeks when wound is healed  Continue Lasix 40mg+ Potassium 20meq daily

## 2025-03-06 NOTE — TELEPHONE ENCOUNTER
Message from Marisol    have him come back in 3-4 weeks. once wound heals. thanks         Left message for patient to call the office.

## 2025-03-12 ENCOUNTER — HOSPITAL ENCOUNTER (OUTPATIENT)
Dept: WOUND CARE | Age: 70
Discharge: HOME | End: 2025-03-12
Attending: NURSE PRACTITIONER
Payer: COMMERCIAL

## 2025-03-12 VITALS
SYSTOLIC BLOOD PRESSURE: 113 MMHG | HEART RATE: 70 BPM | TEMPERATURE: 98.1 F | DIASTOLIC BLOOD PRESSURE: 73 MMHG | RESPIRATION RATE: 19 BRPM

## 2025-03-12 DIAGNOSIS — R73.03 PREDIABETES: ICD-10-CM

## 2025-03-12 DIAGNOSIS — L97.922 SKIN ULCER OF LEFT LOWER LEG WITH FAT LAYER EXPOSED (HCC): ICD-10-CM

## 2025-03-12 DIAGNOSIS — T81.89XD NONHEALING SURGICAL WOUND, SUBSEQUENT ENCOUNTER: ICD-10-CM

## 2025-03-12 DIAGNOSIS — R60.0 BILATERAL LEG EDEMA: ICD-10-CM

## 2025-03-12 DIAGNOSIS — E66.01 OBESITY, CLASS III, BMI 40-49.9 (MORBID OBESITY): Primary | ICD-10-CM

## 2025-03-12 PROCEDURE — 11042 DBRDMT SUBQ TIS 1ST 20SQCM/<: CPT | Performed by: NURSE PRACTITIONER

## 2025-03-12 PROCEDURE — 11042 DBRDMT SUBQ TIS 1ST 20SQCM/<: CPT

## 2025-03-12 RX ORDER — LIDOCAINE HYDROCHLORIDE 20 MG/ML
JELLY TOPICAL PRN
OUTPATIENT
Start: 2025-03-12

## 2025-03-12 RX ORDER — TRIAMCINOLONE ACETONIDE 1 MG/G
OINTMENT TOPICAL PRN
OUTPATIENT
Start: 2025-03-12

## 2025-03-12 RX ORDER — GENTAMICIN SULFATE 1 MG/G
OINTMENT TOPICAL PRN
OUTPATIENT
Start: 2025-03-12

## 2025-03-12 RX ORDER — GINSENG 100 MG
CAPSULE ORAL PRN
OUTPATIENT
Start: 2025-03-12

## 2025-03-12 RX ORDER — BACITRACIN ZINC AND POLYMYXIN B SULFATE 500; 1000 [USP'U]/G; [USP'U]/G
OINTMENT TOPICAL PRN
OUTPATIENT
Start: 2025-03-12

## 2025-03-12 RX ORDER — SILVER SULFADIAZINE 10 MG/G
CREAM TOPICAL PRN
OUTPATIENT
Start: 2025-03-12

## 2025-03-12 RX ORDER — NEOMYCIN/BACITRACIN/POLYMYXINB 3.5-400-5K
OINTMENT (GRAM) TOPICAL PRN
OUTPATIENT
Start: 2025-03-12

## 2025-03-12 RX ORDER — LIDOCAINE 50 MG/G
OINTMENT TOPICAL PRN
OUTPATIENT
Start: 2025-03-12

## 2025-03-12 RX ORDER — MUPIROCIN 20 MG/G
OINTMENT TOPICAL PRN
OUTPATIENT
Start: 2025-03-12

## 2025-03-12 RX ORDER — LIDOCAINE HYDROCHLORIDE 40 MG/ML
SOLUTION TOPICAL PRN
OUTPATIENT
Start: 2025-03-12

## 2025-03-12 RX ORDER — BETAMETHASONE DIPROPIONATE 0.5 MG/G
CREAM TOPICAL PRN
OUTPATIENT
Start: 2025-03-12

## 2025-03-12 RX ORDER — LIDOCAINE 40 MG/G
CREAM TOPICAL PRN
OUTPATIENT
Start: 2025-03-12

## 2025-03-12 RX ORDER — CLOBETASOL PROPIONATE 0.5 MG/G
OINTMENT TOPICAL PRN
OUTPATIENT
Start: 2025-03-12

## 2025-03-12 RX ORDER — SODIUM CHLOR/HYPOCHLOROUS ACID 0.033 %
SOLUTION, IRRIGATION IRRIGATION PRN
OUTPATIENT
Start: 2025-03-12

## 2025-03-12 ASSESSMENT — PAIN SCALES - GENERAL: PAINLEVEL_OUTOF10: 0

## 2025-03-12 NOTE — PATIENT INSTRUCTIONS
PHYSICIAN ORDERS AND DISCHARGE INSTRUCTIONS     Wound cleansing:               Do not scrub or use excessive force.              Wash hands with soap and water before and after dressing changes.              Prior to applying a clean dressing, cleanse wound with normal saline,               wound cleanser, or mild soap and water.               Ask the physician or nurse before getting the wound(s) wet in a shower                      Wound Care Notes:  Cardiology: Dr Scott   S/P CABG                                 Orders for this week:  3/12/2025              Left Lower Leg : Wash with soap and water, pat dry.   Stimulen powder to wound bed.  Apply dry damp hydraferra blue to periwound  secured with steristrips   Stimulen powder and Liquid silver nitrate damp hydraferra blue to wound bed secured with steristrips   Cover with Large agile and tegaderm secured with mastisol.  Wrap with double Ace from ankle to knee.   Leave in place for 1 week  Dispense 30 day quantity when ordering supplies.  Plan:          Follow Up Instructions:  1 week   Primary Wound Care Provider: Carie Bowman CNP   Call  for any questions or concerns.  Central [phone] for imaging and lab work

## 2025-03-12 NOTE — PROGRESS NOTES
Wound Care Center Progress Visit      Mark Bourgeois  AGE: 69 y.o.   GENDER: male  : 1955  EPISODE DATE:  3/12/2025   Referred by: Hyacinth Garcia MD     Subjective:     CHIEF COMPLAINT  WOUND   Problem List Items Addressed This Visit          Musculoskeletal and Integument    Skin ulcer of left lower leg with fat layer exposed (HCC)    Relevant Orders    Initiate Outpatient Wound Care Protocol    Initiate Oxygen Therapy Protocol       Other    Obesity, Class III, BMI 40-49.9 (morbid obesity) - Primary    Relevant Orders    Initiate Outpatient Wound Care Protocol    Initiate Oxygen Therapy Protocol    Prediabetes    Relevant Orders    Initiate Outpatient Wound Care Protocol    Initiate Oxygen Therapy Protocol    Bilateral leg edema    Relevant Orders    Initiate Outpatient Wound Care Protocol    Initiate Oxygen Therapy Protocol    Nonhealing surgical wound, subsequent encounter    Relevant Orders    Initiate Outpatient Wound Care Protocol    Initiate Oxygen Therapy Protocol       Chief Complaint   Patient presents with    Wound Check        HISTORY of PRESENT ILLNESS      Mark Bourgeois is a 69 y.o. 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 The condition is of moderate severity. The ulcer has been present since .  The underlying cause is thought to be nonhealing surgical post CABG 24. most recently  s/p left lower extremity exploration with washout; wound vac placement  on 24. The patients care to date has included evaluation per CV surgeon with referral to the wound clinic. The patient has significant underlying medical conditions as below. Hyacinth Garcia MD . Advanced wound care modalities established with initiation of care at the wound clinic.The patient has significant underlying medical conditions as below. Hyacinth Garcia MD .    Living Situation  [x] Home [] SNF []  Assisted

## 2025-03-19 ENCOUNTER — HOSPITAL ENCOUNTER (OUTPATIENT)
Dept: WOUND CARE | Age: 70
Discharge: HOME | End: 2025-03-19
Attending: NURSE PRACTITIONER
Payer: COMMERCIAL

## 2025-03-19 VITALS
DIASTOLIC BLOOD PRESSURE: 71 MMHG | TEMPERATURE: 97.6 F | HEART RATE: 64 BPM | RESPIRATION RATE: 16 BRPM | SYSTOLIC BLOOD PRESSURE: 129 MMHG

## 2025-03-19 DIAGNOSIS — R60.0 BILATERAL LEG EDEMA: ICD-10-CM

## 2025-03-19 DIAGNOSIS — T81.89XD NONHEALING SURGICAL WOUND, SUBSEQUENT ENCOUNTER: ICD-10-CM

## 2025-03-19 DIAGNOSIS — R73.03 PREDIABETES: ICD-10-CM

## 2025-03-19 DIAGNOSIS — L97.922 SKIN ULCER OF LEFT LOWER LEG WITH FAT LAYER EXPOSED (HCC): Primary | ICD-10-CM

## 2025-03-19 DIAGNOSIS — E66.01 OBESITY, CLASS III, BMI 40-49.9 (MORBID OBESITY): ICD-10-CM

## 2025-03-19 PROCEDURE — 11042 DBRDMT SUBQ TIS 1ST 20SQCM/<: CPT | Performed by: NURSE PRACTITIONER

## 2025-03-19 PROCEDURE — 11042 DBRDMT SUBQ TIS 1ST 20SQCM/<: CPT

## 2025-03-19 RX ORDER — GENTAMICIN SULFATE 1 MG/G
OINTMENT TOPICAL PRN
OUTPATIENT
Start: 2025-03-19

## 2025-03-19 RX ORDER — LIDOCAINE 50 MG/G
OINTMENT TOPICAL PRN
OUTPATIENT
Start: 2025-03-19

## 2025-03-19 RX ORDER — GINSENG 100 MG
CAPSULE ORAL PRN
OUTPATIENT
Start: 2025-03-19

## 2025-03-19 RX ORDER — LIDOCAINE HYDROCHLORIDE 20 MG/ML
JELLY TOPICAL PRN
OUTPATIENT
Start: 2025-03-19

## 2025-03-19 RX ORDER — CLOBETASOL PROPIONATE 0.5 MG/G
OINTMENT TOPICAL PRN
OUTPATIENT
Start: 2025-03-19

## 2025-03-19 RX ORDER — BACITRACIN ZINC AND POLYMYXIN B SULFATE 500; 1000 [USP'U]/G; [USP'U]/G
OINTMENT TOPICAL PRN
OUTPATIENT
Start: 2025-03-19

## 2025-03-19 RX ORDER — SODIUM CHLOR/HYPOCHLOROUS ACID 0.033 %
SOLUTION, IRRIGATION IRRIGATION PRN
OUTPATIENT
Start: 2025-03-19

## 2025-03-19 RX ORDER — LIDOCAINE HYDROCHLORIDE 40 MG/ML
SOLUTION TOPICAL PRN
OUTPATIENT
Start: 2025-03-19

## 2025-03-19 RX ORDER — LIDOCAINE 40 MG/G
CREAM TOPICAL PRN
OUTPATIENT
Start: 2025-03-19

## 2025-03-19 RX ORDER — TRIAMCINOLONE ACETONIDE 1 MG/G
OINTMENT TOPICAL PRN
OUTPATIENT
Start: 2025-03-19

## 2025-03-19 RX ORDER — BETAMETHASONE DIPROPIONATE 0.5 MG/G
CREAM TOPICAL PRN
OUTPATIENT
Start: 2025-03-19

## 2025-03-19 RX ORDER — MUPIROCIN 20 MG/G
OINTMENT TOPICAL PRN
OUTPATIENT
Start: 2025-03-19

## 2025-03-19 RX ORDER — SILVER SULFADIAZINE 10 MG/G
CREAM TOPICAL PRN
OUTPATIENT
Start: 2025-03-19

## 2025-03-19 RX ORDER — NEOMYCIN/BACITRACIN/POLYMYXINB 3.5-400-5K
OINTMENT (GRAM) TOPICAL PRN
OUTPATIENT
Start: 2025-03-19

## 2025-03-19 NOTE — PATIENT INSTRUCTIONS
PHYSICIAN ORDERS AND DISCHARGE INSTRUCTIONS     Wound cleansing:               Do not scrub or use excessive force.              Wash hands with soap and water before and after dressing changes.              Prior to applying a clean dressing, cleanse wound with normal saline,               wound cleanser, or mild soap and water.               Ask the physician or nurse before getting the wound(s) wet in a shower                      Wound Care Notes:  Cardiology: Dr Scott   S/P CABG                                 Orders for this week:  3/19/2025              Left Lower Leg : Wash with soap and water, pat dry.   Stimulen powder and Liquid silver nitrate damp hydraferra blue to wound bed secured with versatel  Covered with agile and tegaderm secured with mastisol.  Wrap with double Ace from ankle to knee.   Leave in place for 1 week    Dispense 30 day quantity when ordering supplies.  Plan:          Follow Up Instructions:  1 week   Primary Wound Care Provider: Carie Bowman CNP   Call  for any questions or concerns.  Central [phone] for imaging and lab work

## 2025-03-19 NOTE — PROGRESS NOTES
knee.)    Written Patient Dismissal Instructions Given            Electronically signed by IVETTE Dunham CNP on 3/19/2025 at 2:48 PM

## 2025-03-26 ENCOUNTER — HOSPITAL ENCOUNTER (OUTPATIENT)
Dept: WOUND CARE | Age: 70
Discharge: HOME | End: 2025-03-26
Attending: NURSE PRACTITIONER
Payer: COMMERCIAL

## 2025-03-26 VITALS
DIASTOLIC BLOOD PRESSURE: 86 MMHG | HEART RATE: 68 BPM | RESPIRATION RATE: 16 BRPM | SYSTOLIC BLOOD PRESSURE: 165 MMHG | TEMPERATURE: 98.1 F

## 2025-03-26 DIAGNOSIS — L97.922 SKIN ULCER OF LEFT LOWER LEG WITH FAT LAYER EXPOSED (HCC): Primary | ICD-10-CM

## 2025-03-26 DIAGNOSIS — T81.89XD NONHEALING SURGICAL WOUND, SUBSEQUENT ENCOUNTER: ICD-10-CM

## 2025-03-26 DIAGNOSIS — R73.03 PREDIABETES: ICD-10-CM

## 2025-03-26 DIAGNOSIS — E66.01 OBESITY, CLASS III, BMI 40-49.9 (MORBID OBESITY): ICD-10-CM

## 2025-03-26 DIAGNOSIS — R60.0 BILATERAL LEG EDEMA: ICD-10-CM

## 2025-03-26 PROCEDURE — 11042 DBRDMT SUBQ TIS 1ST 20SQCM/<: CPT

## 2025-03-26 PROCEDURE — 11042 DBRDMT SUBQ TIS 1ST 20SQCM/<: CPT | Performed by: NURSE PRACTITIONER

## 2025-03-26 RX ORDER — SILVER SULFADIAZINE 10 MG/G
CREAM TOPICAL PRN
OUTPATIENT
Start: 2025-03-26

## 2025-03-26 RX ORDER — LIDOCAINE HYDROCHLORIDE 20 MG/ML
JELLY TOPICAL PRN
OUTPATIENT
Start: 2025-03-26

## 2025-03-26 RX ORDER — GINSENG 100 MG
CAPSULE ORAL PRN
OUTPATIENT
Start: 2025-03-26

## 2025-03-26 RX ORDER — LIDOCAINE 50 MG/G
OINTMENT TOPICAL PRN
OUTPATIENT
Start: 2025-03-26

## 2025-03-26 RX ORDER — MUPIROCIN 20 MG/G
OINTMENT TOPICAL PRN
OUTPATIENT
Start: 2025-03-26

## 2025-03-26 RX ORDER — SODIUM CHLOR/HYPOCHLOROUS ACID 0.033 %
SOLUTION, IRRIGATION IRRIGATION PRN
OUTPATIENT
Start: 2025-03-26

## 2025-03-26 RX ORDER — BACITRACIN ZINC AND POLYMYXIN B SULFATE 500; 1000 [USP'U]/G; [USP'U]/G
OINTMENT TOPICAL PRN
OUTPATIENT
Start: 2025-03-26

## 2025-03-26 RX ORDER — NEOMYCIN/BACITRACIN/POLYMYXINB 3.5-400-5K
OINTMENT (GRAM) TOPICAL PRN
OUTPATIENT
Start: 2025-03-26

## 2025-03-26 RX ORDER — CLOBETASOL PROPIONATE 0.5 MG/G
OINTMENT TOPICAL PRN
OUTPATIENT
Start: 2025-03-26

## 2025-03-26 RX ORDER — TRIAMCINOLONE ACETONIDE 1 MG/G
OINTMENT TOPICAL PRN
OUTPATIENT
Start: 2025-03-26

## 2025-03-26 RX ORDER — LIDOCAINE HYDROCHLORIDE 40 MG/ML
SOLUTION TOPICAL PRN
OUTPATIENT
Start: 2025-03-26

## 2025-03-26 RX ORDER — LIDOCAINE 40 MG/G
CREAM TOPICAL PRN
OUTPATIENT
Start: 2025-03-26

## 2025-03-26 RX ORDER — GENTAMICIN SULFATE 1 MG/G
OINTMENT TOPICAL PRN
OUTPATIENT
Start: 2025-03-26

## 2025-03-26 RX ORDER — BETAMETHASONE DIPROPIONATE 0.5 MG/G
CREAM TOPICAL PRN
OUTPATIENT
Start: 2025-03-26

## 2025-03-26 NOTE — PATIENT INSTRUCTIONS
PHYSICIAN ORDERS AND DISCHARGE INSTRUCTIONS     Wound cleansing:               Do not scrub or use excessive force.              Wash hands with soap and water before and after dressing changes.              Prior to applying a clean dressing, cleanse wound with normal saline,               wound cleanser, or mild soap and water.               Ask the physician or nurse before getting the wound(s) wet in a shower                      Wound Care Notes:  Cardiology: Dr Scott   S/P CABG                                 Orders for this week:  3/26/2025              Left Lower Leg : Wash with soap and water, pat dry.   Covered with agile and tegaderm secured with mastisol.  Wrap with double Ace from ankle to knee.   Leave in place for 1 week    Dispense 30 day quantity when ordering supplies.  Plan:          Follow Up Instructions:  1 week   Primary Wound Care Provider: Carie Bowman CNP   Call  for any questions or concerns.  Central [phone] for imaging and lab work

## 2025-03-26 NOTE — PROGRESS NOTES
Wound Care Center Progress Visit      Mark Bourgeois  AGE: 69 y.o.   GENDER: male  : 1955  EPISODE DATE:  3/26/2025   Referred by: Hyacinth Garcia MD     Subjective:     CHIEF COMPLAINT  WOUND   Problem List Items Addressed This Visit          Musculoskeletal and Integument    Skin ulcer of left lower leg with fat layer exposed (HCC) - Primary    Relevant Orders    Initiate Outpatient Wound Care Protocol    Initiate Oxygen Therapy Protocol       Other    Obesity, Class III, BMI 40-49.9 (morbid obesity)    Relevant Orders    Initiate Outpatient Wound Care Protocol    Initiate Oxygen Therapy Protocol    Prediabetes    Relevant Orders    Initiate Outpatient Wound Care Protocol    Initiate Oxygen Therapy Protocol    Bilateral leg edema    Relevant Orders    Initiate Outpatient Wound Care Protocol    Initiate Oxygen Therapy Protocol    Nonhealing surgical wound, subsequent encounter    Relevant Orders    Initiate Outpatient Wound Care Protocol    Initiate Oxygen Therapy Protocol       Chief Complaint   Patient presents with    Wound Check        HISTORY of PRESENT ILLNESS      Mark Bourgeois is a 69 y.o. 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 The condition is of moderate severity. The ulcer has been present since .  The underlying cause is thought to be nonhealing surgical post CABG 24. most recently  s/p left lower extremity exploration with washout; wound vac placement  on 24. The patients care to date has included evaluation per CV surgeon with referral to the wound clinic. The patient has significant underlying medical conditions as below. Hyacinth Garcia MD . Advanced wound care modalities established with initiation of care at the wound clinic.The patient has significant underlying medical conditions as below. Hyacinth Garcia MD .    Living Situation  [x] Home [] SNF []  Assisted

## 2025-03-31 ENCOUNTER — OFFICE VISIT (OUTPATIENT)
Dept: CARDIOLOGY CLINIC | Age: 70
End: 2025-03-31
Payer: COMMERCIAL

## 2025-03-31 VITALS
HEART RATE: 69 BPM | OXYGEN SATURATION: 96 % | SYSTOLIC BLOOD PRESSURE: 132 MMHG | DIASTOLIC BLOOD PRESSURE: 78 MMHG | BODY MASS INDEX: 41.16 KG/M2 | HEIGHT: 71 IN | WEIGHT: 294 LBS

## 2025-03-31 DIAGNOSIS — I47.10 SVT (SUPRAVENTRICULAR TACHYCARDIA): ICD-10-CM

## 2025-03-31 DIAGNOSIS — T81.89XD NONHEALING SURGICAL WOUND, SUBSEQUENT ENCOUNTER: ICD-10-CM

## 2025-03-31 DIAGNOSIS — R06.02 SOB (SHORTNESS OF BREATH) ON EXERTION: ICD-10-CM

## 2025-03-31 DIAGNOSIS — R00.2 PALPITATIONS: ICD-10-CM

## 2025-03-31 DIAGNOSIS — R60.0 BILATERAL LEG EDEMA: ICD-10-CM

## 2025-03-31 DIAGNOSIS — I10 PRIMARY HYPERTENSION: Primary | ICD-10-CM

## 2025-03-31 DIAGNOSIS — R73.03 PREDIABETES: ICD-10-CM

## 2025-03-31 DIAGNOSIS — E78.5 HYPERLIPIDEMIA, UNSPECIFIED HYPERLIPIDEMIA TYPE: ICD-10-CM

## 2025-03-31 PROCEDURE — 3078F DIAST BP <80 MM HG: CPT | Performed by: INTERNAL MEDICINE

## 2025-03-31 PROCEDURE — 1123F ACP DISCUSS/DSCN MKR DOCD: CPT | Performed by: INTERNAL MEDICINE

## 2025-03-31 PROCEDURE — 1159F MED LIST DOCD IN RCRD: CPT | Performed by: INTERNAL MEDICINE

## 2025-03-31 PROCEDURE — 3075F SYST BP GE 130 - 139MM HG: CPT | Performed by: INTERNAL MEDICINE

## 2025-03-31 PROCEDURE — 99214 OFFICE O/P EST MOD 30 MIN: CPT | Performed by: INTERNAL MEDICINE

## 2025-03-31 RX ORDER — METOPROLOL SUCCINATE 200 MG/1
200 TABLET, EXTENDED RELEASE ORAL DAILY
Qty: 30 TABLET | Refills: 3 | Status: SHIPPED | OUTPATIENT
Start: 2025-03-31

## 2025-03-31 RX ORDER — FUROSEMIDE 40 MG/1
40 TABLET ORAL DAILY
Qty: 90 TABLET | Refills: 3 | Status: SHIPPED | OUTPATIENT
Start: 2025-03-31

## 2025-03-31 RX ORDER — ATORVASTATIN CALCIUM 80 MG/1
80 TABLET, FILM COATED ORAL DAILY
Qty: 90 TABLET | Refills: 3 | Status: SHIPPED | OUTPATIENT
Start: 2025-03-31

## 2025-03-31 RX ORDER — CLOPIDOGREL BISULFATE 75 MG/1
75 TABLET ORAL DAILY
Qty: 30 TABLET | Refills: 3 | Status: SHIPPED | OUTPATIENT
Start: 2025-03-31

## 2025-04-02 ENCOUNTER — HOSPITAL ENCOUNTER (OUTPATIENT)
Dept: WOUND CARE | Age: 70
Discharge: HOME | End: 2025-04-02
Attending: NURSE PRACTITIONER
Payer: COMMERCIAL

## 2025-04-02 VITALS — DIASTOLIC BLOOD PRESSURE: 76 MMHG | HEART RATE: 72 BPM | TEMPERATURE: 97.8 F | SYSTOLIC BLOOD PRESSURE: 155 MMHG

## 2025-04-02 DIAGNOSIS — L97.922 SKIN ULCER OF LEFT LOWER LEG WITH FAT LAYER EXPOSED (HCC): Primary | ICD-10-CM

## 2025-04-02 DIAGNOSIS — R73.03 PREDIABETES: ICD-10-CM

## 2025-04-02 DIAGNOSIS — T81.89XD NONHEALING SURGICAL WOUND, SUBSEQUENT ENCOUNTER: ICD-10-CM

## 2025-04-02 PROCEDURE — 11042 DBRDMT SUBQ TIS 1ST 20SQCM/<: CPT

## 2025-04-02 PROCEDURE — 11042 DBRDMT SUBQ TIS 1ST 20SQCM/<: CPT | Performed by: NURSE PRACTITIONER

## 2025-04-02 NOTE — PATIENT INSTRUCTIONS
PHYSICIAN ORDERS AND DISCHARGE INSTRUCTIONS     Wound cleansing:               Do not scrub or use excessive force.              Wash hands with soap and water before and after dressing changes.              Prior to applying a clean dressing, cleanse wound with normal saline,               wound cleanser, or mild soap and water.               Ask the physician or nurse before getting the wound(s) wet in a shower                      Wound Care Notes:  Cardiology: Dr Scott   S/P CABG                                 Orders for this week:  2025              Left Lower Leg : Wash with soap and water, pat dry.   Cover with agile and tegaderm secured with mastisol.  Wrap with double Ace from ankle to knee.   Leave in place for 1 week    Dispense 30 day quantity when ordering supplies.  Plan:          Follow Up Instructions:  1 week   Primary Wound Care Provider: Carie Bowman CNP   Call  for any questions or concerns.  Central [phone] for imaging and lab work

## 2025-04-02 NOTE — PROGRESS NOTES
Surgical 04/02/25 0804   Dressing Status New dressing applied;Dry;Clean;Intact 04/02/25 0844   Wound Cleansed Wound cleanser 03/26/25 0748   Dressing/Treatment Other (comment) 03/26/25 0839   Offloading for Diabetic Foot Ulcers Offloading not ordered 04/02/25 0844   Dressing Change Due 12/06/24 12/05/24 1500   Wound Length (cm) 2.7 cm 04/02/25 0804   Wound Width (cm) 0.9 cm 04/02/25 0804   Wound Depth (cm) 0.1 cm 04/02/25 0804   Wound Surface Area (cm^2) 2.43 cm^2 04/02/25 0804   Change in Wound Size % (l*w) 79.75 04/02/25 0804   Wound Volume (cm^3) 0.243 cm^3 04/02/25 0804   Wound Healing % 99 04/02/25 0804   Post-Procedure Length (cm) 2.7 cm 04/02/25 0835   Post-Procedure Width (cm) 0.9 cm 04/02/25 0835   Post-Procedure Depth (cm) 0.1 cm 04/02/25 0835   Post-Procedure Surface Area (cm^2) 2.43 cm^2 04/02/25 0835   Post-Procedure Volume (cm^3) 0.243 cm^3 04/02/25 0835   Distance Tunneling (cm) 0 cm 04/02/25 0804   Tunneling Position ___ O'Clock 0 04/02/25 0804   Undermining Starts ___ O'Clock 0 04/02/25 0804   Undermining Ends___ O'Clock 0 04/02/25 0804   Undermining Maxium Distance (cm) 0 04/02/25 0804   Wound Assessment Pink/red 04/02/25 0804   Drainage Amount Moderate (25-50%) 04/02/25 0804   Drainage Description Serosanguinous 04/02/25 0804   Odor None 04/02/25 0804   Stephanie-wound Assessment Intact 04/02/25 0804   Margins Defined edges 04/02/25 0804   Wound Thickness Description not for Pressure Injury Full thickness 04/02/25 0804   Number of days: 117     Total  Area  Debrided:  2.4 sq cm     Bleeding:  Minimal    Hemostasis Achieved:  by pressure    Procedural Pain:  0  / 10     Post Procedural Pain:  0 / 10     Response to treatment:  Well tolerated by patient.         Plan:     Discharge instructions:    Patient Instructions   PHYSICIAN ORDERS AND DISCHARGE INSTRUCTIONS     Wound cleansing:               Do not scrub or use excessive force.              Wash hands with soap and water before and after dressing

## 2025-04-06 NOTE — PATIENT INSTRUCTIONS
[address] [facility] Locations - No appointment necessary. Sites open Monday to Friday. Call your preferred location for test preparation, business   hours and other information you need. [employer] [facility] [employer]. [address] [facility]. [address] [place], [address] [facility]  Phone: [phone]     **It is YOUR responsibilty to bring medication bottles and/or updated medication list to [address]. This will allow us to better serve you and all your healthcare needs**  Please be informed that if you contact our office outside of normal business hours the physician on call cannot help with any scheduling or rescheduling issues, procedure instruction questions or any type of medication issue. We advise you for any urgent/emergency that you go to the nearest emergency room! PLEASE CALL OUR OFFICE DURING NORMAL BUSINESS HOURS    Monday - Friday   8 am to 5 pm    [place]: [address]: [phone]    [place]:  [phone]  Thank you for allowing us to care for you today! We want to ensure we can follow your treatment plan and we strive to give you the best outcomes and experience possible. If you ever have a life threatening emergency and call 911 - for an ambulance (EMS)   Our providers can only care for you at:   [facility] or [facility]. Even if you have someone take you or you drive yourself we can only care for you in a [facility] facility. Our providers are not setup at the other healthcare locations! We are committed to providing you the best care possible. If you receive a survey after visiting one of our offices, please take time to share your experience concerning your physician office visit. These surveys are confidential and no health information about you is shared. We are eager to improve for you and we are counting on your feedback to help make that happen.
stated

## 2025-04-09 ENCOUNTER — HOSPITAL ENCOUNTER (OUTPATIENT)
Dept: WOUND CARE | Age: 70
Discharge: HOME | End: 2025-04-09
Attending: NURSE PRACTITIONER
Payer: COMMERCIAL

## 2025-04-09 VITALS
DIASTOLIC BLOOD PRESSURE: 72 MMHG | TEMPERATURE: 96 F | SYSTOLIC BLOOD PRESSURE: 142 MMHG | RESPIRATION RATE: 16 BRPM | HEART RATE: 65 BPM

## 2025-04-09 DIAGNOSIS — L97.922 SKIN ULCER OF LEFT LOWER LEG WITH FAT LAYER EXPOSED (HCC): ICD-10-CM

## 2025-04-09 DIAGNOSIS — R73.03 PREDIABETES: ICD-10-CM

## 2025-04-09 DIAGNOSIS — T81.89XD NONHEALING SURGICAL WOUND, SUBSEQUENT ENCOUNTER: Primary | ICD-10-CM

## 2025-04-09 DIAGNOSIS — R60.0 BILATERAL LEG EDEMA: ICD-10-CM

## 2025-04-09 DIAGNOSIS — E66.813 OBESITY, CLASS III, BMI 40-49.9 (MORBID OBESITY): ICD-10-CM

## 2025-04-09 PROCEDURE — 11042 DBRDMT SUBQ TIS 1ST 20SQCM/<: CPT

## 2025-04-09 PROCEDURE — 11042 DBRDMT SUBQ TIS 1ST 20SQCM/<: CPT | Performed by: NURSE PRACTITIONER

## 2025-04-09 RX ORDER — LIDOCAINE 50 MG/G
OINTMENT TOPICAL PRN
OUTPATIENT
Start: 2025-04-09

## 2025-04-09 RX ORDER — LIDOCAINE HYDROCHLORIDE 40 MG/ML
SOLUTION TOPICAL PRN
OUTPATIENT
Start: 2025-04-09

## 2025-04-09 RX ORDER — GINSENG 100 MG
CAPSULE ORAL PRN
OUTPATIENT
Start: 2025-04-09

## 2025-04-09 RX ORDER — GENTAMICIN SULFATE 1 MG/G
OINTMENT TOPICAL PRN
OUTPATIENT
Start: 2025-04-09

## 2025-04-09 RX ORDER — LIDOCAINE 40 MG/G
CREAM TOPICAL PRN
OUTPATIENT
Start: 2025-04-09

## 2025-04-09 RX ORDER — SODIUM CHLOR/HYPOCHLOROUS ACID 0.033 %
SOLUTION, IRRIGATION IRRIGATION PRN
OUTPATIENT
Start: 2025-04-09

## 2025-04-09 RX ORDER — NEOMYCIN/BACITRACIN/POLYMYXINB 3.5-400-5K
OINTMENT (GRAM) TOPICAL PRN
OUTPATIENT
Start: 2025-04-09

## 2025-04-09 RX ORDER — MUPIROCIN 20 MG/G
OINTMENT TOPICAL PRN
OUTPATIENT
Start: 2025-04-09

## 2025-04-09 RX ORDER — TRIAMCINOLONE ACETONIDE 1 MG/G
OINTMENT TOPICAL PRN
OUTPATIENT
Start: 2025-04-09

## 2025-04-09 RX ORDER — LIDOCAINE HYDROCHLORIDE 20 MG/ML
JELLY TOPICAL PRN
OUTPATIENT
Start: 2025-04-09

## 2025-04-09 RX ORDER — BETAMETHASONE DIPROPIONATE 0.5 MG/G
CREAM TOPICAL PRN
OUTPATIENT
Start: 2025-04-09

## 2025-04-09 RX ORDER — SILVER SULFADIAZINE 10 MG/G
CREAM TOPICAL PRN
OUTPATIENT
Start: 2025-04-09

## 2025-04-09 RX ORDER — CLOBETASOL PROPIONATE 0.5 MG/G
OINTMENT TOPICAL PRN
OUTPATIENT
Start: 2025-04-09

## 2025-04-09 RX ORDER — BACITRACIN ZINC AND POLYMYXIN B SULFATE 500; 1000 [USP'U]/G; [USP'U]/G
OINTMENT TOPICAL PRN
OUTPATIENT
Start: 2025-04-09

## 2025-04-09 ASSESSMENT — PAIN SCALES - GENERAL: PAINLEVEL_OUTOF10: 0

## 2025-04-09 NOTE — PATIENT INSTRUCTIONS
PHYSICIAN ORDERS AND DISCHARGE INSTRUCTIONS     Wound cleansing:               Do not scrub or use excessive force.              Wash hands with soap and water before and after dressing changes.              Prior to applying a clean dressing, cleanse wound with normal saline,               wound cleanser, or mild soap and water.               Ask the physician or nurse before getting the wound(s) wet in a shower                      Wound Care Notes:  Cardiology: Dr Scott   S/P CABG                                 Orders for this week:  2025              Left Lower Leg : Wash with soap and water, pat dry.   Cover with agile and tegaderm secured with mastisol.  Wrap with double Ace from ankle to knee.   Leave in place for 1 week    Dispense 30 day quantity when ordering supplies.  Plan:          Follow Up Instructions:  1 week   Primary Wound Care Provider: Carie Bowman CNP   Call  for any questions or concerns.  Central [phone] for imaging and lab work

## 2025-04-09 NOTE — PROGRESS NOTES
Wound Care Center Progress Visit      Mark Bourgeois  AGE: 69 y.o.   GENDER: male  : 1955  EPISODE DATE:  2025   Referred by: Hyacinth Garcia MD     Subjective:     CHIEF COMPLAINT  WOUND   Problem List Items Addressed This Visit          Musculoskeletal and Integument    Skin ulcer of left lower leg with fat layer exposed (HCC)    Relevant Orders    Initiate Outpatient Wound Care Protocol    Initiate Oxygen Therapy Protocol       Other    Obesity, Class III, BMI 40-49.9 (morbid obesity)    Relevant Orders    Initiate Outpatient Wound Care Protocol    Initiate Oxygen Therapy Protocol    Prediabetes    Relevant Orders    Initiate Outpatient Wound Care Protocol    Initiate Oxygen Therapy Protocol    Bilateral leg edema    Relevant Orders    Initiate Outpatient Wound Care Protocol    Initiate Oxygen Therapy Protocol    Nonhealing surgical wound, subsequent encounter - Primary    Relevant Orders    Initiate Outpatient Wound Care Protocol    Initiate Oxygen Therapy Protocol       Chief Complaint   Patient presents with    Wound Check        HISTORY of PRESENT ILLNESS      Mark Bourgeois is a 69 y.o. 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 The condition is of moderate severity. The ulcer has been present since .  The underlying cause is thought to be nonhealing surgical post CABG 24. most recently  s/p left lower extremity exploration with washout; wound vac placement  on 24. The patients care to date has included evaluation per CV surgeon with referral to the wound clinic. The patient has significant underlying medical conditions as below. Hyacinth Garcia MD . Advanced wound care modalities established with initiation of care at the wound clinic.The patient has significant underlying medical conditions as below. Hyacinth Garcia MD .    Living Situation  [x] Home [] SNF []  Assisted

## 2025-04-16 ENCOUNTER — HOSPITAL ENCOUNTER (OUTPATIENT)
Dept: WOUND CARE | Age: 70
Discharge: HOME | End: 2025-04-16
Attending: NURSE PRACTITIONER
Payer: COMMERCIAL

## 2025-04-16 VITALS
DIASTOLIC BLOOD PRESSURE: 96 MMHG | TEMPERATURE: 96.8 F | HEART RATE: 70 BPM | SYSTOLIC BLOOD PRESSURE: 140 MMHG | RESPIRATION RATE: 16 BRPM

## 2025-04-16 DIAGNOSIS — L97.922 SKIN ULCER OF LEFT LOWER LEG WITH FAT LAYER EXPOSED (HCC): Primary | ICD-10-CM

## 2025-04-16 DIAGNOSIS — R60.0 BILATERAL LEG EDEMA: ICD-10-CM

## 2025-04-16 DIAGNOSIS — R73.03 PREDIABETES: ICD-10-CM

## 2025-04-16 DIAGNOSIS — T81.89XD NONHEALING SURGICAL WOUND, SUBSEQUENT ENCOUNTER: ICD-10-CM

## 2025-04-16 DIAGNOSIS — E66.813 OBESITY, CLASS III, BMI 40-49.9 (MORBID OBESITY): ICD-10-CM

## 2025-04-16 PROCEDURE — 17250 CHEM CAUT OF GRANLTJ TISSUE: CPT | Performed by: NURSE PRACTITIONER

## 2025-04-16 PROCEDURE — 17250 CHEM CAUT OF GRANLTJ TISSUE: CPT

## 2025-04-16 RX ORDER — MUPIROCIN 20 MG/G
OINTMENT TOPICAL PRN
OUTPATIENT
Start: 2025-04-16

## 2025-04-16 RX ORDER — LIDOCAINE HYDROCHLORIDE 20 MG/ML
JELLY TOPICAL PRN
OUTPATIENT
Start: 2025-04-16

## 2025-04-16 RX ORDER — LIDOCAINE HYDROCHLORIDE 40 MG/ML
SOLUTION TOPICAL PRN
OUTPATIENT
Start: 2025-04-16

## 2025-04-16 RX ORDER — GINSENG 100 MG
CAPSULE ORAL PRN
OUTPATIENT
Start: 2025-04-16

## 2025-04-16 RX ORDER — NEOMYCIN/BACITRACIN/POLYMYXINB 3.5-400-5K
OINTMENT (GRAM) TOPICAL PRN
OUTPATIENT
Start: 2025-04-16

## 2025-04-16 RX ORDER — LIDOCAINE 40 MG/G
CREAM TOPICAL PRN
OUTPATIENT
Start: 2025-04-16

## 2025-04-16 RX ORDER — SODIUM CHLOR/HYPOCHLOROUS ACID 0.033 %
SOLUTION, IRRIGATION IRRIGATION PRN
OUTPATIENT
Start: 2025-04-16

## 2025-04-16 RX ORDER — GENTAMICIN SULFATE 1 MG/G
OINTMENT TOPICAL PRN
OUTPATIENT
Start: 2025-04-16

## 2025-04-16 RX ORDER — CLOBETASOL PROPIONATE 0.5 MG/G
OINTMENT TOPICAL PRN
OUTPATIENT
Start: 2025-04-16

## 2025-04-16 RX ORDER — TRIAMCINOLONE ACETONIDE 1 MG/G
OINTMENT TOPICAL PRN
OUTPATIENT
Start: 2025-04-16

## 2025-04-16 RX ORDER — LIDOCAINE 50 MG/G
OINTMENT TOPICAL PRN
OUTPATIENT
Start: 2025-04-16

## 2025-04-16 RX ORDER — BACITRACIN ZINC AND POLYMYXIN B SULFATE 500; 1000 [USP'U]/G; [USP'U]/G
OINTMENT TOPICAL PRN
OUTPATIENT
Start: 2025-04-16

## 2025-04-16 RX ORDER — SILVER SULFADIAZINE 10 MG/G
CREAM TOPICAL PRN
OUTPATIENT
Start: 2025-04-16

## 2025-04-16 RX ORDER — BETAMETHASONE DIPROPIONATE 0.5 MG/G
CREAM TOPICAL PRN
OUTPATIENT
Start: 2025-04-16

## 2025-04-16 NOTE — PROGRESS NOTES
Wound cleansing:               Do not scrub or use excessive force.              Wash hands with soap and water before and after dressing changes.              Prior to applying a clean dressing, cleanse wound with normal saline,               wound cleanser, or mild soap and water.               Ask the physician or nurse before getting the wound(s) wet in a shower                      Wound Care Notes:  Cardiology: Dr Scott   S/P CABG                                 Orders for this week:  2025              Left Lower Leg : Wash with soap and water, pat dry.   Cover with agile and tegaderm secured with mastisol.  Wrap with double Ace from ankle to knee.   Leave in place for 1 week    Dispense 30 day quantity when ordering supplies.  Plan:          Follow Up Instructions:  1 week   Primary Wound Care Provider: Carie Bowman CNP   Call  for any questions or concerns.  Central [phone] for imaging and lab work    Treatment Note Wound 24 #1 Left Medial Lower Leg-Dressing/Treatment:  (Ca Alginate, Gentac, Tegaderm)    Written Patient Dismissal Instructions Given            Electronically signed by IVETTE Dunham CNP on 2025 at 9:08 AM

## 2025-04-16 NOTE — PATIENT INSTRUCTIONS
PHYSICIAN ORDERS AND DISCHARGE INSTRUCTIONS     Wound cleansing:               Do not scrub or use excessive force.              Wash hands with soap and water before and after dressing changes.              Prior to applying a clean dressing, cleanse wound with normal saline,               wound cleanser, or mild soap and water.               Ask the physician or nurse before getting the wound(s) wet in a shower                      Wound Care Notes:  Cardiology: Dr Scott   S/P CABG                                 Orders for this week:  2025              Left Lower Leg : Wash with soap and water, pat dry.   Cover with agile and tegaderm secured with mastisol.  Wrap with double Ace from ankle to knee.   Leave in place for 1 week    Dispense 30 day quantity when ordering supplies.  Plan:          Follow Up Instructions:  1 week   Primary Wound Care Provider: Carie Bowman CNP   Call  for any questions or concerns.  Central [phone] for imaging and lab work

## 2025-04-21 ENCOUNTER — TELEPHONE (OUTPATIENT)
Dept: WOUND CARE | Age: 70
End: 2025-04-21

## 2025-04-21 NOTE — TELEPHONE ENCOUNTER
returned pt v/m left friday concerning his wound wrap coming off during his shower. left msg for pt to return call to schedule appt to get re-wrapped.

## 2025-04-23 ENCOUNTER — HOSPITAL ENCOUNTER (OUTPATIENT)
Dept: WOUND CARE | Age: 70
Discharge: HOME | End: 2025-04-23
Attending: NURSE PRACTITIONER
Payer: COMMERCIAL

## 2025-04-23 VITALS
SYSTOLIC BLOOD PRESSURE: 168 MMHG | HEART RATE: 64 BPM | RESPIRATION RATE: 20 BRPM | DIASTOLIC BLOOD PRESSURE: 75 MMHG | TEMPERATURE: 98.6 F

## 2025-04-23 DIAGNOSIS — R73.03 PREDIABETES: ICD-10-CM

## 2025-04-23 DIAGNOSIS — R60.0 BILATERAL LEG EDEMA: ICD-10-CM

## 2025-04-23 DIAGNOSIS — T81.89XD NONHEALING SURGICAL WOUND, SUBSEQUENT ENCOUNTER: ICD-10-CM

## 2025-04-23 DIAGNOSIS — E66.813 OBESITY, CLASS III, BMI 40-49.9 (MORBID OBESITY) (HCC): ICD-10-CM

## 2025-04-23 DIAGNOSIS — L97.922 SKIN ULCER OF LEFT LOWER LEG WITH FAT LAYER EXPOSED (HCC): Primary | ICD-10-CM

## 2025-04-23 PROCEDURE — 17250 CHEM CAUT OF GRANLTJ TISSUE: CPT | Performed by: NURSE PRACTITIONER

## 2025-04-23 PROCEDURE — 17250 CHEM CAUT OF GRANLTJ TISSUE: CPT

## 2025-04-23 RX ORDER — SILVER SULFADIAZINE 10 MG/G
CREAM TOPICAL PRN
OUTPATIENT
Start: 2025-04-23

## 2025-04-23 RX ORDER — TRIAMCINOLONE ACETONIDE 1 MG/G
OINTMENT TOPICAL PRN
OUTPATIENT
Start: 2025-04-23

## 2025-04-23 RX ORDER — LIDOCAINE HYDROCHLORIDE 40 MG/ML
SOLUTION TOPICAL PRN
OUTPATIENT
Start: 2025-04-23

## 2025-04-23 RX ORDER — NEOMYCIN/BACITRACIN/POLYMYXINB 3.5-400-5K
OINTMENT (GRAM) TOPICAL PRN
OUTPATIENT
Start: 2025-04-23

## 2025-04-23 RX ORDER — CLOBETASOL PROPIONATE 0.5 MG/G
OINTMENT TOPICAL PRN
OUTPATIENT
Start: 2025-04-23

## 2025-04-23 RX ORDER — MUPIROCIN 20 MG/G
OINTMENT TOPICAL PRN
OUTPATIENT
Start: 2025-04-23

## 2025-04-23 RX ORDER — BACITRACIN ZINC AND POLYMYXIN B SULFATE 500; 1000 [USP'U]/G; [USP'U]/G
OINTMENT TOPICAL PRN
OUTPATIENT
Start: 2025-04-23

## 2025-04-23 RX ORDER — LIDOCAINE 40 MG/G
CREAM TOPICAL PRN
OUTPATIENT
Start: 2025-04-23

## 2025-04-23 RX ORDER — GENTAMICIN SULFATE 1 MG/G
OINTMENT TOPICAL PRN
OUTPATIENT
Start: 2025-04-23

## 2025-04-23 RX ORDER — BETAMETHASONE DIPROPIONATE 0.5 MG/G
CREAM TOPICAL PRN
OUTPATIENT
Start: 2025-04-23

## 2025-04-23 RX ORDER — LIDOCAINE 50 MG/G
OINTMENT TOPICAL PRN
OUTPATIENT
Start: 2025-04-23

## 2025-04-23 RX ORDER — BACITRACIN ZINC 500 [USP'U]/G
OINTMENT TOPICAL PRN
OUTPATIENT
Start: 2025-04-23

## 2025-04-23 RX ORDER — SODIUM CHLOR/HYPOCHLOROUS ACID 0.033 %
SOLUTION, IRRIGATION IRRIGATION PRN
OUTPATIENT
Start: 2025-04-23

## 2025-04-23 RX ORDER — LIDOCAINE HYDROCHLORIDE 20 MG/ML
JELLY TOPICAL PRN
OUTPATIENT
Start: 2025-04-23

## 2025-04-23 NOTE — PROGRESS NOTES
Wound Care Center Progress Visit      Mark Bourgeois  AGE: 69 y.o.   GENDER: male  : 1955  EPISODE DATE:  2025   Referred by: Hyacinth Garcia MD     Subjective:     CHIEF COMPLAINT  WOUND   Problem List Items Addressed This Visit          Musculoskeletal and Integument    Skin ulcer of left lower leg with fat layer exposed (HCC) - Primary    Relevant Orders    Initiate Outpatient Wound Care Protocol       Other    Obesity, Class III, BMI 40-49.9 (morbid obesity)    Relevant Orders    Initiate Outpatient Wound Care Protocol    Prediabetes    Relevant Orders    Initiate Outpatient Wound Care Protocol    Bilateral leg edema    Relevant Orders    Initiate Outpatient Wound Care Protocol    Nonhealing surgical wound, subsequent encounter    Relevant Orders    Initiate Outpatient Wound Care Protocol       Chief Complaint   Patient presents with    Wound Check        HISTORY of PRESENT ILLNESS      Mark Bourgeois is a 69 y.o. 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 The condition is of moderate severity. The ulcer has been present since .  The underlying cause is thought to be nonhealing surgical post CABG 24. most recently  s/p left lower extremity exploration with washout; wound vac placement  on 24. The patients care to date has included evaluation per CV surgeon with referral to the wound clinic. The patient has significant underlying medical conditions as below. Hyacinth Garcia MD . Advanced wound care modalities established with initiation of care at the wound clinic.The patient has significant underlying medical conditions as below. Hyacinth Garcia MD .    Living Situation  [x] Home [] SNF []  Assisted living  [] Other (ie rehab, etc.) [] Home Health  []  Transportation    Wound Pain Timing/Severity: waxing and waning, mild  Quality of pain: dull, aching, tender  Severity of pain:

## 2025-04-23 NOTE — PATIENT INSTRUCTIONS
PHYSICIAN ORDERS AND DISCHARGE INSTRUCTIONS     Wound cleansing:               Do not scrub or use excessive force.              Wash hands with soap and water before and after dressing changes.              Prior to applying a clean dressing, cleanse wound with normal saline,               wound cleanser, or mild soap and water.               Ask the physician or nurse before getting the wound(s) wet in a shower                      Wound Care Notes:  Cardiology: Dr Scott   S/P CABG                                 Orders for this week:  2025              Left Lower Leg : Wash with soap and water, pat dry.   Cover with ca alginate gentac and tegaderm secured with mastisol.  Wrap with double Ace from ankle to knee.   Leave in place for 1 week    Dispense 30 day quantity when ordering supplies.  Plan:          Follow Up Instructions:  1 week   Primary Wound Care Provider: Carie Bowman CNP   Call  for any questions or concerns.  Central [phone] for imaging and lab work

## 2025-04-30 ENCOUNTER — HOSPITAL ENCOUNTER (OUTPATIENT)
Dept: WOUND CARE | Age: 70
Discharge: HOME | End: 2025-04-30
Attending: NURSE PRACTITIONER
Payer: COMMERCIAL

## 2025-04-30 VITALS
RESPIRATION RATE: 20 BRPM | HEART RATE: 65 BPM | DIASTOLIC BLOOD PRESSURE: 73 MMHG | SYSTOLIC BLOOD PRESSURE: 128 MMHG | TEMPERATURE: 96.7 F

## 2025-04-30 DIAGNOSIS — R60.0 BILATERAL LEG EDEMA: ICD-10-CM

## 2025-04-30 DIAGNOSIS — L02.212 ABSCESS OF BACK: ICD-10-CM

## 2025-04-30 DIAGNOSIS — L97.922 SKIN ULCER OF LEFT LOWER LEG WITH FAT LAYER EXPOSED (HCC): Primary | ICD-10-CM

## 2025-04-30 DIAGNOSIS — T81.89XD NONHEALING SURGICAL WOUND, SUBSEQUENT ENCOUNTER: ICD-10-CM

## 2025-04-30 PROCEDURE — 11042 DBRDMT SUBQ TIS 1ST 20SQCM/<: CPT

## 2025-04-30 PROCEDURE — 11042 DBRDMT SUBQ TIS 1ST 20SQCM/<: CPT | Performed by: NURSE PRACTITIONER

## 2025-04-30 PROCEDURE — 99213 OFFICE O/P EST LOW 20 MIN: CPT | Performed by: NURSE PRACTITIONER

## 2025-04-30 RX ORDER — SULFAMETHOXAZOLE AND TRIMETHOPRIM 800; 160 MG/1; MG/1
1 TABLET ORAL 2 TIMES DAILY
Qty: 28 TABLET | Refills: 0 | Status: SHIPPED | OUTPATIENT
Start: 2025-04-30 | End: 2025-05-14

## 2025-04-30 NOTE — PROGRESS NOTES
Wound Care Center Progress Visit      Mark Bourgeois  AGE: 69 y.o.   GENDER: male  : 1955  EPISODE DATE:  2025   Referred by: Hyacinth Garcia MD     Subjective:     CHIEF COMPLAINT  WOUND   Problem List Items Addressed This Visit          Musculoskeletal and Integument    Skin ulcer of left lower leg with fat layer exposed (HCC)       Other    Bilateral leg edema    Abscess of back - Primary         Chief Complaint   Patient presents with    Wound Check        HISTORY of PRESENT ILLNESS      Mark Bourgeois is a 69 y.o. 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 The condition is of moderate severity. The ulcer has been present since .  The underlying cause is thought to be nonhealing surgical post CABG 24. most recently  s/p left lower extremity exploration with washout; wound vac placement  on 24. The patients care to date has included evaluation per CV surgeon with referral to the wound clinic. The patient has significant underlying medical conditions as below. Hyacinth Garcia MD . Advanced wound care modalities established with initiation of care at the wound clinic.The patient has significant underlying medical conditions as below. Hyacinth Garcia MD .    Living Situation  [x] Home [] SNF []  Assisted living  [] Other (ie rehab, etc.) [] Home Health  []  Transportation    Wound Pain Timing/Severity: waxing and waning, mild  Quality of pain: dull, aching, tender  Severity of pain:  3 / 10   Modifying Factors: edema, obesity, and prediabetes  Associated Signs/Symptoms: edema, drainage, and pain    Wound status:  2025       SIGNIFICANT, SEPARATELY IDENTIFIABLE EVALUATION AND MANAGEMENT SERVICE BY THE SAME PROVIDER ON THE SAME DAY OF THE PROCEDURE OR OTHER SERVICE:        Diagnosis & Medically appropriate history/examination & Medical Decision Making:   New Abscess of back noted

## 2025-04-30 NOTE — PATIENT INSTRUCTIONS
PHYSICIAN ORDERS AND DISCHARGE INSTRUCTIONS     Wound cleansing:               Do not scrub or use excessive force.              Wash hands with soap and water before and after dressing changes.              Prior to applying a clean dressing, cleanse wound with normal saline,               wound cleanser, or mild soap and water.               Ask the physician or nurse before getting the wound(s) wet in a shower                      Wound Care Notes:  Cardiology: Dr Scott   S/P CABG                                 Orders for this week:  2025              Left Lower Leg : Wash with soap and water, pat dry.   Ascorbic acid,  hydraferra blue cut to fit wound bed secured with versatel and steristrips to left medial lower leg  Apply puracol ag to left anterior lower leg   Cover all wounds with with gentac and tegaderm secured with mastisol.  Wrap with double Ace from ankle to knee.   Leave in place for 1 week    Dispense 30 day quantity when ordering supplies.  Plan:          Follow Up Instructions:  1 week   Primary Wound Care Provider: Carie Bowman CNP   Call  for any questions or concerns.  Central [phone] for imaging and lab work   04-Aug-2023 00:00

## 2025-05-07 ENCOUNTER — HOSPITAL ENCOUNTER (OUTPATIENT)
Dept: WOUND CARE | Age: 70
Discharge: HOME | End: 2025-05-07
Attending: NURSE PRACTITIONER
Payer: COMMERCIAL

## 2025-05-07 VITALS
HEART RATE: 66 BPM | RESPIRATION RATE: 18 BRPM | SYSTOLIC BLOOD PRESSURE: 142 MMHG | TEMPERATURE: 97.2 F | DIASTOLIC BLOOD PRESSURE: 78 MMHG

## 2025-05-07 DIAGNOSIS — R73.03 PREDIABETES: ICD-10-CM

## 2025-05-07 DIAGNOSIS — L02.212 ABSCESS OF BACK: ICD-10-CM

## 2025-05-07 DIAGNOSIS — T81.89XD NONHEALING SURGICAL WOUND, SUBSEQUENT ENCOUNTER: ICD-10-CM

## 2025-05-07 DIAGNOSIS — L97.922 SKIN ULCER OF LEFT LOWER LEG WITH FAT LAYER EXPOSED (HCC): Primary | ICD-10-CM

## 2025-05-07 DIAGNOSIS — R60.0 BILATERAL LEG EDEMA: ICD-10-CM

## 2025-05-07 DIAGNOSIS — E66.813 OBESITY, CLASS III, BMI 40-49.9 (MORBID OBESITY) (HCC): ICD-10-CM

## 2025-05-07 PROCEDURE — 11042 DBRDMT SUBQ TIS 1ST 20SQCM/<: CPT | Performed by: NURSE PRACTITIONER

## 2025-05-07 PROCEDURE — 11042 DBRDMT SUBQ TIS 1ST 20SQCM/<: CPT

## 2025-05-07 RX ORDER — LIDOCAINE 50 MG/G
OINTMENT TOPICAL PRN
OUTPATIENT
Start: 2025-05-07

## 2025-05-07 RX ORDER — LIDOCAINE 40 MG/G
CREAM TOPICAL PRN
OUTPATIENT
Start: 2025-05-07

## 2025-05-07 RX ORDER — TRIAMCINOLONE ACETONIDE 1 MG/G
OINTMENT TOPICAL PRN
OUTPATIENT
Start: 2025-05-07

## 2025-05-07 RX ORDER — LIDOCAINE HYDROCHLORIDE 20 MG/ML
JELLY TOPICAL PRN
OUTPATIENT
Start: 2025-05-07

## 2025-05-07 RX ORDER — BETAMETHASONE DIPROPIONATE 0.5 MG/G
CREAM TOPICAL PRN
OUTPATIENT
Start: 2025-05-07

## 2025-05-07 RX ORDER — MUPIROCIN 20 MG/G
OINTMENT TOPICAL PRN
OUTPATIENT
Start: 2025-05-07

## 2025-05-07 RX ORDER — GENTAMICIN SULFATE 1 MG/G
OINTMENT TOPICAL PRN
OUTPATIENT
Start: 2025-05-07

## 2025-05-07 RX ORDER — SILVER SULFADIAZINE 10 MG/G
CREAM TOPICAL PRN
OUTPATIENT
Start: 2025-05-07

## 2025-05-07 RX ORDER — CLOBETASOL PROPIONATE 0.5 MG/G
OINTMENT TOPICAL PRN
OUTPATIENT
Start: 2025-05-07

## 2025-05-07 RX ORDER — GINSENG 100 MG
CAPSULE ORAL PRN
OUTPATIENT
Start: 2025-05-07

## 2025-05-07 RX ORDER — LIDOCAINE HYDROCHLORIDE 40 MG/ML
SOLUTION TOPICAL PRN
OUTPATIENT
Start: 2025-05-07

## 2025-05-07 RX ORDER — BACITRACIN ZINC AND POLYMYXIN B SULFATE 500; 1000 [USP'U]/G; [USP'U]/G
OINTMENT TOPICAL PRN
OUTPATIENT
Start: 2025-05-07

## 2025-05-07 RX ORDER — NEOMYCIN/BACITRACIN/POLYMYXINB 3.5-400-5K
OINTMENT (GRAM) TOPICAL PRN
OUTPATIENT
Start: 2025-05-07

## 2025-05-07 RX ORDER — SODIUM CHLOR/HYPOCHLOROUS ACID 0.033 %
SOLUTION, IRRIGATION IRRIGATION PRN
OUTPATIENT
Start: 2025-05-07

## 2025-05-07 NOTE — PROGRESS NOTES
0758   Wound Surface Area (cm^2) 0.01 cm^2 05/07/25 0758   Change in Wound Size % (l*w) 99.33 05/07/25 0758   Wound Volume (cm^3) 0.001 cm^3 05/07/25 0758   Wound Healing % 99 05/07/25 0758   Post-Procedure Length (cm) 0.1 cm 05/07/25 0816   Post-Procedure Width (cm) 0.1 cm 05/07/25 0816   Post-Procedure Depth (cm) 0.1 cm 05/07/25 0816   Post-Procedure Surface Area (cm^2) 0.01 cm^2 05/07/25 0816   Post-Procedure Volume (cm^3) 0.001 cm^3 05/07/25 0816   Distance Tunneling (cm) 0 cm 05/07/25 0758   Tunneling Position ___ O'Clock 0 05/07/25 0758   Undermining Starts ___ O'Clock 0 05/07/25 0758   Undermining Ends___ O'Clock 0 05/07/25 0758   Undermining Maxium Distance (cm) 0 05/07/25 0758   Wound Assessment Pink/red 05/07/25 0758   Drainage Amount None (dry) 05/07/25 0758   Drainage Description Serosanguinous 04/30/25 0805   Odor None 05/07/25 0758   Stephanie-wound Assessment Dry/flaky 05/07/25 0758   Margins Defined edges 05/07/25 0758   Wound Thickness Description not for Pressure Injury Full thickness 05/07/25 0758   Number of days: 7       Total  Area  Debrided:  1.15 sq cm     Bleeding:  Minimal    Hemostasis Achieved:  by pressure    Procedural Pain:  0  / 10     Post Procedural Pain:  0 / 10     Response to treatment:  Well tolerated by patient.      Plan:     Discharge instructions:    Patient Instructions   PHYSICIAN ORDERS AND DISCHARGE INSTRUCTIONS     Wound cleansing:               Do not scrub or use excessive force.              Wash hands with soap and water before and after dressing changes.              Prior to applying a clean dressing, cleanse wound with normal saline,               wound cleanser, or mild soap and water.               Ask the physician or nurse before getting the wound(s) wet in a shower                      Wound Care Notes:  Cardiology: Dr Scott   S/P CABG                                 Orders for this week:  5/7/2025              Left Lower Leg : Wash with soap and water, pat dry.

## 2025-05-07 NOTE — PATIENT INSTRUCTIONS
PHYSICIAN ORDERS AND DISCHARGE INSTRUCTIONS     Wound cleansing:               Do not scrub or use excessive force.              Wash hands with soap and water before and after dressing changes.              Prior to applying a clean dressing, cleanse wound with normal saline,               wound cleanser, or mild soap and water.               Ask the physician or nurse before getting the wound(s) wet in a shower                      Wound Care Notes:  Cardiology: Dr Scott   S/P CABG                                 Orders for this week:  2025              Left Lower Leg : Wash with soap and water, pat dry.   Ascorbic acid,  hydraferra blue cut to fit wound bed secured with versatel and steristrips to left medial lower leg  Apply puracol ag to left anterior lower leg   Cover all wounds with with gentac and tegaderm secured with mastisol.  Wrap with double Ace from ankle to knee.   Leave in place for 1 week    Dispense 30 day quantity when ordering supplies.  Plan:          Follow Up Instructions:  1 week   Primary Wound Care Provider: Carie Bowman CNP   Call  for any questions or concerns.  Central [phone] for imaging and lab work

## 2025-05-14 ENCOUNTER — HOSPITAL ENCOUNTER (OUTPATIENT)
Dept: WOUND CARE | Age: 70
Discharge: HOME | End: 2025-05-14
Attending: NURSE PRACTITIONER
Payer: COMMERCIAL

## 2025-05-14 VITALS
SYSTOLIC BLOOD PRESSURE: 138 MMHG | HEART RATE: 67 BPM | RESPIRATION RATE: 19 BRPM | TEMPERATURE: 97.5 F | DIASTOLIC BLOOD PRESSURE: 70 MMHG

## 2025-05-14 DIAGNOSIS — E66.813 OBESITY, CLASS III, BMI 40-49.9 (MORBID OBESITY) (HCC): ICD-10-CM

## 2025-05-14 DIAGNOSIS — T81.89XD NONHEALING SURGICAL WOUND, SUBSEQUENT ENCOUNTER: ICD-10-CM

## 2025-05-14 DIAGNOSIS — R60.0 BILATERAL LEG EDEMA: ICD-10-CM

## 2025-05-14 DIAGNOSIS — R73.03 PREDIABETES: ICD-10-CM

## 2025-05-14 DIAGNOSIS — L97.922 SKIN ULCER OF LEFT LOWER LEG WITH FAT LAYER EXPOSED (HCC): Primary | ICD-10-CM

## 2025-05-14 PROCEDURE — 11042 DBRDMT SUBQ TIS 1ST 20SQCM/<: CPT | Performed by: NURSE PRACTITIONER

## 2025-05-14 PROCEDURE — 11042 DBRDMT SUBQ TIS 1ST 20SQCM/<: CPT

## 2025-05-14 RX ORDER — LIDOCAINE HYDROCHLORIDE 20 MG/ML
JELLY TOPICAL PRN
OUTPATIENT
Start: 2025-05-14

## 2025-05-14 RX ORDER — LIDOCAINE 50 MG/G
OINTMENT TOPICAL PRN
OUTPATIENT
Start: 2025-05-14

## 2025-05-14 RX ORDER — BETAMETHASONE DIPROPIONATE 0.5 MG/G
CREAM TOPICAL PRN
OUTPATIENT
Start: 2025-05-14

## 2025-05-14 RX ORDER — SODIUM CHLOR/HYPOCHLOROUS ACID 0.033 %
SOLUTION, IRRIGATION IRRIGATION PRN
OUTPATIENT
Start: 2025-05-14

## 2025-05-14 RX ORDER — LIDOCAINE 40 MG/G
CREAM TOPICAL PRN
OUTPATIENT
Start: 2025-05-14

## 2025-05-14 RX ORDER — SILVER SULFADIAZINE 10 MG/G
CREAM TOPICAL PRN
OUTPATIENT
Start: 2025-05-14

## 2025-05-14 RX ORDER — CLOBETASOL PROPIONATE 0.5 MG/G
OINTMENT TOPICAL PRN
OUTPATIENT
Start: 2025-05-14

## 2025-05-14 RX ORDER — GINSENG 100 MG
CAPSULE ORAL PRN
OUTPATIENT
Start: 2025-05-14

## 2025-05-14 RX ORDER — BACITRACIN ZINC AND POLYMYXIN B SULFATE 500; 1000 [USP'U]/G; [USP'U]/G
OINTMENT TOPICAL PRN
OUTPATIENT
Start: 2025-05-14

## 2025-05-14 RX ORDER — NEOMYCIN/BACITRACIN/POLYMYXINB 3.5-400-5K
OINTMENT (GRAM) TOPICAL PRN
OUTPATIENT
Start: 2025-05-14

## 2025-05-14 RX ORDER — GENTAMICIN SULFATE 1 MG/G
OINTMENT TOPICAL PRN
OUTPATIENT
Start: 2025-05-14

## 2025-05-14 RX ORDER — TRIAMCINOLONE ACETONIDE 1 MG/G
OINTMENT TOPICAL PRN
OUTPATIENT
Start: 2025-05-14

## 2025-05-14 RX ORDER — MUPIROCIN 20 MG/G
OINTMENT TOPICAL PRN
OUTPATIENT
Start: 2025-05-14

## 2025-05-14 RX ORDER — LIDOCAINE HYDROCHLORIDE 40 MG/ML
SOLUTION TOPICAL PRN
OUTPATIENT
Start: 2025-05-14

## 2025-05-14 NOTE — PATIENT INSTRUCTIONS
PHYSICIAN ORDERS AND DISCHARGE INSTRUCTIONS     Wound cleansing:               Do not scrub or use excessive force.              Wash hands with soap and water before and after dressing changes.              Prior to applying a clean dressing, cleanse wound with normal saline,               wound cleanser, or mild soap and water.               Ask the physician or nurse before getting the wound(s) wet in a shower                      Wound Care Notes:  Cardiology: Dr Scott   S/P CABG                                 Orders for this week:  2025              Left Lower Leg : Wash with soap and water, pat dry.   Saline Damp  hydraferra blue cut to fit wound bed secured with versatel and steristrips to left medial lower leg  Cover with with gentac and tegaderm secured with mastisol.  Wrap with double Ace from ankle to knee.   Leave in place for 1 week    Dispense 30 day quantity when ordering supplies.  Plan:          Follow Up Instructions:  1 week   Primary Wound Care Provider: Carie Bowman CNP   Call  for any questions or concerns.  Central [phone] for imaging and lab work

## 2025-05-14 NOTE — PROGRESS NOTES
Gentac, Tegaderm)    Written Patient Dismissal Instructions Given            Electronically signed by IVETTE Garrison CNP on 5/14/2025 at 8:27 AM

## 2025-05-19 NOTE — PROGRESS NOTES
8:00AM-4:30PM    [facility]: 4:30[facility] 8:00AM Call or Page Surgeon on-call,     Step-down patient follow up: 4:30PM to 8:00AM Page or secure chat PA on-call

## 2025-05-20 ENCOUNTER — OFFICE VISIT (OUTPATIENT)
Dept: CARDIOTHORACIC SURGERY | Age: 70
End: 2025-05-20
Payer: COMMERCIAL

## 2025-05-20 VITALS
DIASTOLIC BLOOD PRESSURE: 70 MMHG | WEIGHT: 304.5 LBS | BODY MASS INDEX: 42.63 KG/M2 | HEIGHT: 71 IN | OXYGEN SATURATION: 95 % | HEART RATE: 73 BPM | SYSTOLIC BLOOD PRESSURE: 142 MMHG

## 2025-05-20 DIAGNOSIS — T81.89XD NON-HEALING SURGICAL WOUND, SUBSEQUENT ENCOUNTER: Primary | ICD-10-CM

## 2025-05-20 PROCEDURE — 3078F DIAST BP <80 MM HG: CPT | Performed by: PHYSICIAN ASSISTANT

## 2025-05-20 PROCEDURE — 3077F SYST BP >= 140 MM HG: CPT | Performed by: PHYSICIAN ASSISTANT

## 2025-05-20 PROCEDURE — 1126F AMNT PAIN NOTED NONE PRSNT: CPT | Performed by: PHYSICIAN ASSISTANT

## 2025-05-20 PROCEDURE — 99214 OFFICE O/P EST MOD 30 MIN: CPT | Performed by: PHYSICIAN ASSISTANT

## 2025-05-20 PROCEDURE — 1123F ACP DISCUSS/DSCN MKR DOCD: CPT | Performed by: PHYSICIAN ASSISTANT

## 2025-05-21 ENCOUNTER — HOSPITAL ENCOUNTER (OUTPATIENT)
Dept: WOUND CARE | Age: 70
Discharge: HOME | End: 2025-05-21
Attending: NURSE PRACTITIONER
Payer: COMMERCIAL

## 2025-05-21 VITALS
SYSTOLIC BLOOD PRESSURE: 151 MMHG | TEMPERATURE: 98.2 F | HEART RATE: 63 BPM | DIASTOLIC BLOOD PRESSURE: 78 MMHG | RESPIRATION RATE: 18 BRPM

## 2025-05-21 DIAGNOSIS — L97.922 SKIN ULCER OF LEFT LOWER LEG WITH FAT LAYER EXPOSED (HCC): ICD-10-CM

## 2025-05-21 DIAGNOSIS — E66.813 OBESITY, CLASS III, BMI 40-49.9 (MORBID OBESITY) (HCC): ICD-10-CM

## 2025-05-21 DIAGNOSIS — R60.0 BILATERAL LEG EDEMA: Primary | ICD-10-CM

## 2025-05-21 DIAGNOSIS — L02.212 ABSCESS OF BACK: ICD-10-CM

## 2025-05-21 DIAGNOSIS — T81.89XD NONHEALING SURGICAL WOUND, SUBSEQUENT ENCOUNTER: ICD-10-CM

## 2025-05-21 DIAGNOSIS — R73.03 PREDIABETES: ICD-10-CM

## 2025-05-21 PROCEDURE — 17250 CHEM CAUT OF GRANLTJ TISSUE: CPT | Performed by: NURSE PRACTITIONER

## 2025-05-21 PROCEDURE — 17250 CHEM CAUT OF GRANLTJ TISSUE: CPT

## 2025-05-21 RX ORDER — LIDOCAINE HYDROCHLORIDE 20 MG/ML
JELLY TOPICAL PRN
OUTPATIENT
Start: 2025-05-21

## 2025-05-21 RX ORDER — BACITRACIN ZINC AND POLYMYXIN B SULFATE 500; 1000 [USP'U]/G; [USP'U]/G
OINTMENT TOPICAL PRN
OUTPATIENT
Start: 2025-05-21

## 2025-05-21 RX ORDER — SODIUM CHLOR/HYPOCHLOROUS ACID 0.033 %
SOLUTION, IRRIGATION IRRIGATION PRN
OUTPATIENT
Start: 2025-05-21

## 2025-05-21 RX ORDER — LIDOCAINE 40 MG/G
CREAM TOPICAL PRN
OUTPATIENT
Start: 2025-05-21

## 2025-05-21 RX ORDER — SILVER SULFADIAZINE 10 MG/G
CREAM TOPICAL PRN
OUTPATIENT
Start: 2025-05-21

## 2025-05-21 RX ORDER — BETAMETHASONE DIPROPIONATE 0.5 MG/G
CREAM TOPICAL PRN
OUTPATIENT
Start: 2025-05-21

## 2025-05-21 RX ORDER — LIDOCAINE 50 MG/G
OINTMENT TOPICAL PRN
OUTPATIENT
Start: 2025-05-21

## 2025-05-21 RX ORDER — GENTAMICIN SULFATE 1 MG/G
OINTMENT TOPICAL PRN
OUTPATIENT
Start: 2025-05-21

## 2025-05-21 RX ORDER — MUPIROCIN 20 MG/G
OINTMENT TOPICAL PRN
OUTPATIENT
Start: 2025-05-21

## 2025-05-21 RX ORDER — GINSENG 100 MG
CAPSULE ORAL PRN
OUTPATIENT
Start: 2025-05-21

## 2025-05-21 RX ORDER — NEOMYCIN/BACITRACIN/POLYMYXINB 3.5-400-5K
OINTMENT (GRAM) TOPICAL PRN
OUTPATIENT
Start: 2025-05-21

## 2025-05-21 RX ORDER — TRIAMCINOLONE ACETONIDE 1 MG/G
OINTMENT TOPICAL PRN
OUTPATIENT
Start: 2025-05-21

## 2025-05-21 RX ORDER — LIDOCAINE HYDROCHLORIDE 40 MG/ML
SOLUTION TOPICAL PRN
OUTPATIENT
Start: 2025-05-21

## 2025-05-21 RX ORDER — CLOBETASOL PROPIONATE 0.5 MG/G
OINTMENT TOPICAL PRN
OUTPATIENT
Start: 2025-05-21

## 2025-05-21 ASSESSMENT — PAIN SCALES - GENERAL: PAINLEVEL_OUTOF10: 0

## 2025-05-21 NOTE — PATIENT INSTRUCTIONS
PHYSICIAN ORDERS AND DISCHARGE INSTRUCTIONS     Wound cleansing:               Do not scrub or use excessive force.              Wash hands with soap and water before and after dressing changes.              Prior to applying a clean dressing, cleanse wound with normal saline,               wound cleanser, or mild soap and water.               Ask the physician or nurse before getting the wound(s) wet in a shower                      Wound Care Notes:  Cardiology: Dr Scott   S/P CABG                                 Orders for this week:  2025              Left Lower Leg : Wash with soap and water, pat dry.   Saline Damp  hydraferra blue cut to fit wound bed secured with versatel and steristrips to left medial lower leg  Cover with with gentac and tegaderm secured with mastisol.  Wrap with Ace wrap from ankle to knee   Leave in place for 1 week    Dispense 30 day quantity when ordering supplies.  Plan:          Follow Up Instructions:  1 week   Primary Wound Care Provider: Carie Bowman CNP   Call  for any questions or concerns.  Central [phone] for imaging and lab work

## 2025-05-21 NOTE — PROGRESS NOTES
for review. There is diffuse soft tissue swelling with  surgical clips and laceration of the upper left calf. Aside from the skin clips.  No other radiopaque foreign body is visualized allowing for the lack of full  view of the lower extremity.   There is no evidence for acute displaced fracture.   Bone mineralization is decreased.   Soft tissues are edematous.   Joint spaces are maintained.  Impression: 1. Since only a single view is submitted for review which limits evaluation.  2. Laceration with skin clips seen involving the edematous soft tissues of the  posterior left calf.  3. Skin clips demonstrated. No other radiopaque foreign body allowing for  limitation of only a single view submitted for review.    Electronically signed by Brenda Brownlee       No LMP for male patient.     Diabetes:   [x] Prediabetes  [] On an oral regimen  [] On an insulin regimen  Neuropathy:  [] Indicated  [x] Not indicated    Hemoglobin A1C   Date Value Ref Range Status   08/15/2024 6.0 4.2 - 6.3 % Final        Smoking:   Tobacco Use      Smoking status: Never      Smokeless tobacco: Never      Tobacco comments: Patient is non-smoker     Anticoagulant/Antiplatelet therapy: [] N/A [x] Aspirin   [x] Plavix    []Warfarin/Coumadin [] Eliquis/Apixaban   [] Rivaroxaban/Xarelto  [] Dabigatran/ Pradaxa [] Edoxaban/Lixiana    Immunosuppression: [x] No [] Yes    Obesity:  BMI Readings from Last 3 Encounters:   05/20/25 42.49 kg/m²   03/31/25 41.00 kg/m²   01/07/25 40.50 kg/m²        Patient educated on the 6 essential components necessary for wound healing: Circulation, Debridements, Proper Dressings and Topical Wound Products, Infection Control, Edema Control and Offloading.     Patient educated on those factors that negatively effect or impact wound healing: smoking, obesity, uncontrolled diabetes, anticoagulant and immunosuppressive regimens, inadequate nutrition, untreated arterial and venous disease if applicable and measures to manage

## 2025-05-28 ENCOUNTER — HOSPITAL ENCOUNTER (OUTPATIENT)
Dept: WOUND CARE | Age: 70
Discharge: HOME | End: 2025-05-28
Attending: NURSE PRACTITIONER
Payer: COMMERCIAL

## 2025-05-28 VITALS
DIASTOLIC BLOOD PRESSURE: 76 MMHG | SYSTOLIC BLOOD PRESSURE: 154 MMHG | TEMPERATURE: 97.3 F | RESPIRATION RATE: 18 BRPM | HEART RATE: 68 BPM

## 2025-05-28 DIAGNOSIS — T81.89XD NONHEALING SURGICAL WOUND, SUBSEQUENT ENCOUNTER: ICD-10-CM

## 2025-05-28 DIAGNOSIS — L97.922 SKIN ULCER OF LEFT LOWER LEG WITH FAT LAYER EXPOSED (HCC): Primary | ICD-10-CM

## 2025-05-28 DIAGNOSIS — R60.0 BILATERAL LEG EDEMA: ICD-10-CM

## 2025-05-28 DIAGNOSIS — E66.813 OBESITY, CLASS III, BMI 40-49.9 (MORBID OBESITY) (HCC): ICD-10-CM

## 2025-05-28 DIAGNOSIS — R73.03 PREDIABETES: ICD-10-CM

## 2025-05-28 PROCEDURE — 99213 OFFICE O/P EST LOW 20 MIN: CPT | Performed by: NURSE PRACTITIONER

## 2025-05-28 PROCEDURE — 99213 OFFICE O/P EST LOW 20 MIN: CPT

## 2025-05-28 RX ORDER — LIDOCAINE HYDROCHLORIDE 40 MG/ML
SOLUTION TOPICAL PRN
OUTPATIENT
Start: 2025-05-28

## 2025-05-28 RX ORDER — MUPIROCIN 20 MG/G
OINTMENT TOPICAL PRN
OUTPATIENT
Start: 2025-05-28

## 2025-05-28 RX ORDER — TRIAMCINOLONE ACETONIDE 1 MG/G
OINTMENT TOPICAL PRN
OUTPATIENT
Start: 2025-05-28

## 2025-05-28 RX ORDER — GENTAMICIN SULFATE 1 MG/G
OINTMENT TOPICAL PRN
OUTPATIENT
Start: 2025-05-28

## 2025-05-28 RX ORDER — CLOBETASOL PROPIONATE 0.5 MG/G
OINTMENT TOPICAL PRN
OUTPATIENT
Start: 2025-05-28

## 2025-05-28 RX ORDER — GINSENG 100 MG
CAPSULE ORAL PRN
OUTPATIENT
Start: 2025-05-28

## 2025-05-28 RX ORDER — LIDOCAINE HYDROCHLORIDE 20 MG/ML
JELLY TOPICAL PRN
OUTPATIENT
Start: 2025-05-28

## 2025-05-28 RX ORDER — SILVER SULFADIAZINE 10 MG/G
CREAM TOPICAL PRN
OUTPATIENT
Start: 2025-05-28

## 2025-05-28 RX ORDER — LIDOCAINE 50 MG/G
OINTMENT TOPICAL PRN
OUTPATIENT
Start: 2025-05-28

## 2025-05-28 RX ORDER — SODIUM CHLOR/HYPOCHLOROUS ACID 0.033 %
SOLUTION, IRRIGATION IRRIGATION PRN
OUTPATIENT
Start: 2025-05-28

## 2025-05-28 RX ORDER — BACITRACIN ZINC AND POLYMYXIN B SULFATE 500; 1000 [USP'U]/G; [USP'U]/G
OINTMENT TOPICAL PRN
OUTPATIENT
Start: 2025-05-28

## 2025-05-28 RX ORDER — LIDOCAINE 40 MG/G
CREAM TOPICAL PRN
OUTPATIENT
Start: 2025-05-28

## 2025-05-28 RX ORDER — BETAMETHASONE DIPROPIONATE 0.5 MG/G
CREAM TOPICAL PRN
OUTPATIENT
Start: 2025-05-28

## 2025-05-28 RX ORDER — NEOMYCIN/BACITRACIN/POLYMYXINB 3.5-400-5K
OINTMENT (GRAM) TOPICAL PRN
OUTPATIENT
Start: 2025-05-28

## 2025-05-28 ASSESSMENT — PAIN SCALES - GENERAL: PAINLEVEL_OUTOF10: 0

## 2025-05-28 NOTE — PROGRESS NOTES
Wound Care Center Progress Visit      Mark Bourgeois  AGE: 69 y.o.   GENDER: male  : 1955  EPISODE DATE:  2025   Referred by: Hyacinth Garcia MD     Subjective:     CHIEF COMPLAINT  WOUND   Problem List Items Addressed This Visit          Musculoskeletal and Integument    Skin ulcer of left lower leg with fat layer exposed (HCC) - Primary    Relevant Orders    Initiate Outpatient Wound Care Protocol    Initiate Oxygen Therapy Protocol       Other    Obesity, Class III, BMI 40-49.9 (morbid obesity) (HCC)    Relevant Orders    Initiate Outpatient Wound Care Protocol    Initiate Oxygen Therapy Protocol    Prediabetes    Relevant Orders    Initiate Outpatient Wound Care Protocol    Initiate Oxygen Therapy Protocol    Bilateral leg edema    Relevant Orders    Initiate Outpatient Wound Care Protocol    Initiate Oxygen Therapy Protocol    Nonhealing surgical wound, subsequent encounter    Relevant Orders    Initiate Outpatient Wound Care Protocol    Initiate Oxygen Therapy Protocol       Chief Complaint   Patient presents with    Wound Check        HISTORY of PRESENT ILLNESS      Mark Bourgeois is a 69 y.o. 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 The condition is of moderate severity. The ulcer has been present since .  The underlying cause is thought to be nonhealing surgical post CABG 24. most recently  s/p left lower extremity exploration with washout; wound vac placement  on 24. The patients care to date has included evaluation per CV surgeon with referral to the wound clinic. The patient has significant underlying medical conditions as below. Hyacinth Garcia MD . Advanced wound care modalities established with initiation of care at the wound clinic.The patient has significant underlying medical conditions as below. Hyacinth Garcia MD .    Living Situation  [x] Home [] SNF []

## 2025-05-28 NOTE — PATIENT INSTRUCTIONS
PHYSICIAN ORDERS AND DISCHARGE INSTRUCTIONS     Wound cleansing:               Do not scrub or use excessive force.              Wash hands with soap and water before and after dressing changes.              Prior to applying a clean dressing, cleanse wound with normal saline,               wound cleanser, or mild soap and water.               Ask the physician or nurse before getting the wound(s) wet in a shower                      Wound Care Notes:  Cardiology: Dr Scott   S/P CABG                                 Orders for this week:  2025              Left Lower Leg : Wash with soap and water, pat dry.   Apply  stimulen powder to wound bed   Cover with hydraferra blue silicone border   Leave in place for 1 week    Dispense 30 day quantity when ordering supplies.  Plan:          Follow Up Instructions:  1 week   Primary Wound Care Provider: Carie Bowman CNP   Call  for any questions or concerns.  Central [phone] for imaging and lab work

## 2025-06-04 ENCOUNTER — HOSPITAL ENCOUNTER (OUTPATIENT)
Dept: WOUND CARE | Age: 70
Discharge: HOME | End: 2025-06-04
Attending: NURSE PRACTITIONER
Payer: COMMERCIAL

## 2025-06-04 VITALS
DIASTOLIC BLOOD PRESSURE: 77 MMHG | HEART RATE: 68 BPM | RESPIRATION RATE: 16 BRPM | SYSTOLIC BLOOD PRESSURE: 139 MMHG | TEMPERATURE: 97.6 F

## 2025-06-04 DIAGNOSIS — R73.03 PREDIABETES: ICD-10-CM

## 2025-06-04 DIAGNOSIS — T81.89XD NONHEALING SURGICAL WOUND, SUBSEQUENT ENCOUNTER: ICD-10-CM

## 2025-06-04 DIAGNOSIS — R60.0 BILATERAL LEG EDEMA: ICD-10-CM

## 2025-06-04 DIAGNOSIS — E66.813 OBESITY, CLASS III, BMI 40-49.9 (MORBID OBESITY) (HCC): ICD-10-CM

## 2025-06-04 DIAGNOSIS — L97.922 SKIN ULCER OF LEFT LOWER LEG WITH FAT LAYER EXPOSED (HCC): Primary | ICD-10-CM

## 2025-06-04 PROCEDURE — 11042 DBRDMT SUBQ TIS 1ST 20SQCM/<: CPT | Performed by: NURSE PRACTITIONER

## 2025-06-04 PROCEDURE — 11042 DBRDMT SUBQ TIS 1ST 20SQCM/<: CPT

## 2025-06-04 RX ORDER — LIDOCAINE HYDROCHLORIDE 40 MG/ML
SOLUTION TOPICAL PRN
OUTPATIENT
Start: 2025-06-04

## 2025-06-04 RX ORDER — SODIUM CHLOR/HYPOCHLOROUS ACID 0.033 %
SOLUTION, IRRIGATION IRRIGATION PRN
OUTPATIENT
Start: 2025-06-04

## 2025-06-04 RX ORDER — MUPIROCIN 20 MG/G
OINTMENT TOPICAL PRN
OUTPATIENT
Start: 2025-06-04

## 2025-06-04 RX ORDER — LIDOCAINE 50 MG/G
OINTMENT TOPICAL PRN
OUTPATIENT
Start: 2025-06-04

## 2025-06-04 RX ORDER — NEOMYCIN/BACITRACIN/POLYMYXINB 3.5-400-5K
OINTMENT (GRAM) TOPICAL PRN
OUTPATIENT
Start: 2025-06-04

## 2025-06-04 RX ORDER — LIDOCAINE HYDROCHLORIDE 20 MG/ML
JELLY TOPICAL PRN
OUTPATIENT
Start: 2025-06-04

## 2025-06-04 RX ORDER — GENTAMICIN SULFATE 1 MG/G
OINTMENT TOPICAL PRN
OUTPATIENT
Start: 2025-06-04

## 2025-06-04 RX ORDER — CLOBETASOL PROPIONATE 0.5 MG/G
OINTMENT TOPICAL PRN
OUTPATIENT
Start: 2025-06-04

## 2025-06-04 RX ORDER — SILVER SULFADIAZINE 10 MG/G
CREAM TOPICAL PRN
OUTPATIENT
Start: 2025-06-04

## 2025-06-04 RX ORDER — GINSENG 100 MG
CAPSULE ORAL PRN
OUTPATIENT
Start: 2025-06-04

## 2025-06-04 RX ORDER — BETAMETHASONE DIPROPIONATE 0.5 MG/G
CREAM TOPICAL PRN
OUTPATIENT
Start: 2025-06-04

## 2025-06-04 RX ORDER — LIDOCAINE 40 MG/G
CREAM TOPICAL PRN
OUTPATIENT
Start: 2025-06-04

## 2025-06-04 RX ORDER — BACITRACIN ZINC AND POLYMYXIN B SULFATE 500; 1000 [USP'U]/G; [USP'U]/G
OINTMENT TOPICAL PRN
OUTPATIENT
Start: 2025-06-04

## 2025-06-04 RX ORDER — TRIAMCINOLONE ACETONIDE 1 MG/G
OINTMENT TOPICAL PRN
OUTPATIENT
Start: 2025-06-04

## 2025-06-04 NOTE — PROGRESS NOTES
Wound Care Center Progress Visit      Mark Bourgeois  AGE: 69 y.o.   GENDER: male  : 1955  EPISODE DATE:  2025   Referred by: Hyacinth Garcia MD     Subjective:     CHIEF COMPLAINT  WOUND   Problem List Items Addressed This Visit          Musculoskeletal and Integument    Skin ulcer of left lower leg with fat layer exposed (HCC) - Primary    Relevant Orders    Initiate Outpatient Wound Care Protocol       Other    Obesity, Class III, BMI 40-49.9 (morbid obesity) (HCC)    Relevant Orders    Initiate Outpatient Wound Care Protocol    Prediabetes    Relevant Orders    Initiate Outpatient Wound Care Protocol    Bilateral leg edema    Relevant Orders    Initiate Outpatient Wound Care Protocol    Nonhealing surgical wound, subsequent encounter    Relevant Orders    Initiate Outpatient Wound Care Protocol       Chief Complaint   Patient presents with    Wound Check        HISTORY of PRESENT ILLNESS      Mark Bourgeois is a 69 y.o. 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 The condition is of moderate severity. The ulcer has been present since .  The underlying cause is thought to be nonhealing surgical post CABG 24. most recently  s/p left lower extremity exploration with washout; wound vac placement  on 24. The patients care to date has included evaluation per CV surgeon with referral to the wound clinic. The patient has significant underlying medical conditions as below. Hyacinth Garcia MD . Advanced wound care modalities established with initiation of care at the wound clinic.The patient has significant underlying medical conditions as below. Hyacinth Garcia MD .    Living Situation  [x] Home [] SNF []  Assisted living  [] Other (ie rehab, etc.) [] Home Health  []  Transportation    Wound Pain Timing/Severity: waxing and waning, mild  Quality of pain: dull, aching, tender  Severity of

## 2025-06-04 NOTE — PATIENT INSTRUCTIONS
PHYSICIAN ORDERS AND DISCHARGE INSTRUCTIONS     Wound cleansing:               Do not scrub or use excessive force.              Wash hands with soap and water before and after dressing changes.              Prior to applying a clean dressing, cleanse wound with normal saline,               wound cleanser, or mild soap and water.               Ask the physician or nurse before getting the wound(s) wet in a shower                      Wound Care Notes:  Cardiology: Dr Scott   S/P CABG                                 Orders for this week:  2025              Left Lower Leg : Wash with soap and water, pat dry.   Apply  stimulen powder to wound bed   Cover with hydraferra blue silicone border   Leave in place for 1 week    Dispense 30 day quantity when ordering supplies.  Plan:          Follow Up Instructions:  1 week   Primary Wound Care Provider: Carie Bowman CNP   Call  for any questions or concerns.  Central [phone] for imaging and lab work

## 2025-06-11 ENCOUNTER — HOSPITAL ENCOUNTER (OUTPATIENT)
Dept: WOUND CARE | Age: 70
Discharge: HOME | End: 2025-06-11
Attending: NURSE PRACTITIONER
Payer: COMMERCIAL

## 2025-06-11 VITALS — TEMPERATURE: 98 F | RESPIRATION RATE: 20 BRPM

## 2025-06-11 DIAGNOSIS — R73.03 PREDIABETES: ICD-10-CM

## 2025-06-11 DIAGNOSIS — L97.922 SKIN ULCER OF LEFT LOWER LEG WITH FAT LAYER EXPOSED (HCC): Primary | ICD-10-CM

## 2025-06-11 DIAGNOSIS — E66.813 OBESITY, CLASS III, BMI 40-49.9 (MORBID OBESITY) (HCC): ICD-10-CM

## 2025-06-11 DIAGNOSIS — T81.89XD NONHEALING SURGICAL WOUND, SUBSEQUENT ENCOUNTER: ICD-10-CM

## 2025-06-11 DIAGNOSIS — R60.0 BILATERAL LEG EDEMA: ICD-10-CM

## 2025-06-11 PROCEDURE — 97597 DBRDMT OPN WND 1ST 20 CM/<: CPT | Performed by: NURSE PRACTITIONER

## 2025-06-11 PROCEDURE — 97597 DBRDMT OPN WND 1ST 20 CM/<: CPT

## 2025-06-11 RX ORDER — BETAMETHASONE DIPROPIONATE 0.5 MG/G
CREAM TOPICAL PRN
OUTPATIENT
Start: 2025-06-11

## 2025-06-11 RX ORDER — SILVER SULFADIAZINE 10 MG/G
CREAM TOPICAL PRN
OUTPATIENT
Start: 2025-06-11

## 2025-06-11 RX ORDER — SODIUM CHLOR/HYPOCHLOROUS ACID 0.033 %
SOLUTION, IRRIGATION IRRIGATION PRN
OUTPATIENT
Start: 2025-06-11

## 2025-06-11 RX ORDER — BACITRACIN ZINC AND POLYMYXIN B SULFATE 500; 1000 [USP'U]/G; [USP'U]/G
OINTMENT TOPICAL PRN
OUTPATIENT
Start: 2025-06-11

## 2025-06-11 RX ORDER — LIDOCAINE HYDROCHLORIDE 20 MG/ML
JELLY TOPICAL PRN
OUTPATIENT
Start: 2025-06-11

## 2025-06-11 RX ORDER — LIDOCAINE 50 MG/G
OINTMENT TOPICAL PRN
OUTPATIENT
Start: 2025-06-11

## 2025-06-11 RX ORDER — CLOBETASOL PROPIONATE 0.5 MG/G
OINTMENT TOPICAL PRN
OUTPATIENT
Start: 2025-06-11

## 2025-06-11 RX ORDER — GINSENG 100 MG
CAPSULE ORAL PRN
OUTPATIENT
Start: 2025-06-11

## 2025-06-11 RX ORDER — MUPIROCIN 2 %
OINTMENT (GRAM) TOPICAL PRN
OUTPATIENT
Start: 2025-06-11

## 2025-06-11 RX ORDER — NEOMYCIN/BACITRACIN/POLYMYXINB 3.5-400-5K
OINTMENT (GRAM) TOPICAL PRN
OUTPATIENT
Start: 2025-06-11

## 2025-06-11 RX ORDER — LIDOCAINE HYDROCHLORIDE 40 MG/ML
SOLUTION TOPICAL PRN
OUTPATIENT
Start: 2025-06-11

## 2025-06-11 RX ORDER — TRIAMCINOLONE ACETONIDE 1 MG/G
OINTMENT TOPICAL PRN
OUTPATIENT
Start: 2025-06-11

## 2025-06-11 RX ORDER — GENTAMICIN SULFATE 1 MG/G
OINTMENT TOPICAL PRN
OUTPATIENT
Start: 2025-06-11

## 2025-06-11 RX ORDER — LIDOCAINE 40 MG/G
CREAM TOPICAL PRN
OUTPATIENT
Start: 2025-06-11

## 2025-06-11 NOTE — PATIENT INSTRUCTIONS
PHYSICIAN ORDERS AND DISCHARGE INSTRUCTIONS     Wound cleansing:               Do not scrub or use excessive force.              Wash hands with soap and water before and after dressing changes.              Prior to applying a clean dressing, cleanse wound with normal saline,               wound cleanser, or mild soap and water.               Ask the physician or nurse before getting the wound(s) wet in a shower                      Wound Care Notes:  Cardiology: Dr Scott   S/P CABG                                 Orders for this week:  2025              Left Lower Leg : Wash with soap and water, pat dry.   Paint with betadine   MOUSTAPHA       Dispense 30 day quantity when ordering supplies.  Plan:          Follow Up Instructions:  1 week   Primary Wound Care Provider: Carie Bowman CNP   Call  for any questions or concerns.  Central [phone] for imaging and lab work

## 2025-06-18 ENCOUNTER — HOSPITAL ENCOUNTER (OUTPATIENT)
Dept: WOUND CARE | Age: 70
Discharge: HOME | End: 2025-06-18
Attending: NURSE PRACTITIONER
Payer: COMMERCIAL

## 2025-06-18 VITALS
SYSTOLIC BLOOD PRESSURE: 151 MMHG | HEART RATE: 65 BPM | DIASTOLIC BLOOD PRESSURE: 81 MMHG | TEMPERATURE: 98.2 F | RESPIRATION RATE: 18 BRPM

## 2025-06-18 DIAGNOSIS — R60.0 BILATERAL LEG EDEMA: ICD-10-CM

## 2025-06-18 DIAGNOSIS — R73.03 PREDIABETES: ICD-10-CM

## 2025-06-18 DIAGNOSIS — E66.813 OBESITY, CLASS III, BMI 40-49.9 (MORBID OBESITY) (HCC): ICD-10-CM

## 2025-06-18 DIAGNOSIS — T81.89XD NONHEALING SURGICAL WOUND, SUBSEQUENT ENCOUNTER: ICD-10-CM

## 2025-06-18 DIAGNOSIS — L02.212 ABSCESS OF BACK: ICD-10-CM

## 2025-06-18 DIAGNOSIS — L97.922 SKIN ULCER OF LEFT LOWER LEG WITH FAT LAYER EXPOSED (HCC): Primary | ICD-10-CM

## 2025-06-18 PROCEDURE — 99212 OFFICE O/P EST SF 10 MIN: CPT

## 2025-06-18 PROCEDURE — 99213 OFFICE O/P EST LOW 20 MIN: CPT | Performed by: NURSE PRACTITIONER

## 2025-06-18 RX ORDER — CLOBETASOL PROPIONATE 0.5 MG/G
OINTMENT TOPICAL PRN
OUTPATIENT
Start: 2025-06-18

## 2025-06-18 RX ORDER — LIDOCAINE HYDROCHLORIDE 20 MG/ML
JELLY TOPICAL PRN
OUTPATIENT
Start: 2025-06-18

## 2025-06-18 RX ORDER — GINSENG 100 MG
CAPSULE ORAL PRN
OUTPATIENT
Start: 2025-06-18

## 2025-06-18 RX ORDER — MUPIROCIN 2 %
OINTMENT (GRAM) TOPICAL PRN
OUTPATIENT
Start: 2025-06-18

## 2025-06-18 RX ORDER — BACITRACIN ZINC AND POLYMYXIN B SULFATE 500; 1000 [USP'U]/G; [USP'U]/G
OINTMENT TOPICAL PRN
OUTPATIENT
Start: 2025-06-18

## 2025-06-18 RX ORDER — LIDOCAINE HYDROCHLORIDE 40 MG/ML
SOLUTION TOPICAL PRN
OUTPATIENT
Start: 2025-06-18

## 2025-06-18 RX ORDER — LIDOCAINE 50 MG/G
OINTMENT TOPICAL PRN
OUTPATIENT
Start: 2025-06-18

## 2025-06-18 RX ORDER — BETAMETHASONE DIPROPIONATE 0.5 MG/G
CREAM TOPICAL PRN
OUTPATIENT
Start: 2025-06-18

## 2025-06-18 RX ORDER — SILVER SULFADIAZINE 10 MG/G
CREAM TOPICAL PRN
OUTPATIENT
Start: 2025-06-18

## 2025-06-18 RX ORDER — LIDOCAINE 40 MG/G
CREAM TOPICAL PRN
OUTPATIENT
Start: 2025-06-18

## 2025-06-18 RX ORDER — SODIUM CHLOR/HYPOCHLOROUS ACID 0.033 %
SOLUTION, IRRIGATION IRRIGATION PRN
OUTPATIENT
Start: 2025-06-18

## 2025-06-18 RX ORDER — NEOMYCIN/BACITRACIN/POLYMYXINB 3.5-400-5K
OINTMENT (GRAM) TOPICAL PRN
OUTPATIENT
Start: 2025-06-18

## 2025-06-18 RX ORDER — TRIAMCINOLONE ACETONIDE 1 MG/G
OINTMENT TOPICAL PRN
OUTPATIENT
Start: 2025-06-18

## 2025-06-18 RX ORDER — GENTAMICIN SULFATE 1 MG/G
OINTMENT TOPICAL PRN
OUTPATIENT
Start: 2025-06-18

## 2025-06-18 NOTE — PATIENT INSTRUCTIONS
PHYSICIAN ORDERS AND DISCHARGE INSTRUCTIONS     Wound cleansing:               Do not scrub or use excessive force.              Wash hands with soap and water before and after dressing changes.              Prior to applying a clean dressing, cleanse wound with normal saline,               wound cleanser, or mild soap and water.               Ask the physician or nurse before getting the wound(s) wet in a shower                      Wound Care Notes:  Cardiology: Dr Scott   S/P CABG                                 Orders for this week:  2025              Left Lower Leg : Wash with soap and water, pat dry.   Paint with betadine   MOUSTAPHA       Dispense 30 day quantity when ordering supplies.  Plan:          Follow Up Instructions:  Discharged   Primary Wound Care Provider: Carie Bowman CNP   Call  for any questions or concerns.  Central [phone] for imaging and lab work

## 2025-06-18 NOTE — PROGRESS NOTES
Timing/Severity: waxing and waning, mild  Quality of pain: dull, aching, tender  Severity of pain:  1 / 10   Modifying Factors: edema, obesity, and prediabetes  Associated Signs/Symptoms: edema, drainage, and pain    Wound status:  6/18/2025       Site is closed x2 weeks now. Patient would like to be discharged and call the clinic if needed.    Regimen discussed and established with patient/family as below. The patients records were reviewed and discussed.  Time was given for questions. All questions were answered to the patients satisfaction.      [] Stable [] Worse [] Reopened [] Improved [] Initial visit []  Revisit [x]  Healed    [x] Adjustments made to regimen of care  [] Off loading regimen  [] Compression regimen  [] Wound Vac Therapy  [] Increased frequency of dressing change  [] Revaluation of the wound in a shorter interval to assess effectiveness of changes in care  [] Wound culture obtained  [] Antibiotic therapy added based on current and/or previous culture results  [] Medication(s) added to treatment regimen  [] Lab testing ordered  [] Pathology ordered   [] Radiology ordered  [] Callus maintenance: Hyperkeratotic tissue noted, paring of callous completed using curette and tissue nippers. No wound present within the callous. Medical necessity includes advanced atrophic changes to include decrease hair growth lower legs, thickening, discolored toenails,rubor, burning, edema and pain with ambulation. Last seen by PCP, Hyacinth Garcia MD.  [] Toenail maintenance: The patient's toe nails were trimmed in length, using tissue nippers. Medical necessity includes advanced atrophic changes to include decrease hair growth lower legs, thickening, discolored toenails,rubor, burning, edema and pain with ambulation. Last seen by PCP, Hyacinth Garcia MD.   [] Apply for medical grade compression garments  [] Medical grade compression garments in place  [] Apply for lymphedema pumps  [] Lymphedema pumps in place  []

## 2025-07-07 RX ORDER — CLOPIDOGREL BISULFATE 75 MG/1
75 TABLET ORAL DAILY
Qty: 90 TABLET | Refills: 1 | Status: SHIPPED | OUTPATIENT
Start: 2025-07-07

## 2025-08-08 RX ORDER — METOPROLOL SUCCINATE 200 MG/1
200 TABLET, EXTENDED RELEASE ORAL DAILY
Qty: 30 TABLET | Refills: 5 | Status: SHIPPED | OUTPATIENT
Start: 2025-08-08

## (undated) DEVICE — MINI STICK MICRO ACCESS 4FR

## (undated) DEVICE — GUIDEWIRE VASC L175CM POLYMER HYDRPHLC FLAT COWIRE DSGN

## (undated) DEVICE — CANNULA PERF ART 20 FRX8 IN 3/8 IN VENTED W/O FLANGE DLP

## (undated) DEVICE — BLADE OPHTH 180DEG CUT SURF BLU STR SHRP DBL BVL GRINDLESS

## (undated) DEVICE — 260 CM J TIP WIRE .035

## (undated) DEVICE — CABG ACCESSORY: Brand: MEDLINE INDUSTRIES, INC.

## (undated) DEVICE — SENSOR PLSE OXMTR AD CBL L3FT ADH TRANSMISSIVE

## (undated) DEVICE — APPLICATOR MEDICATED 26 CC SOLUTION HI LT ORNG CHLORAPREP

## (undated) DEVICE — 3M™ STERI-DRAPE™ INSTRUMENT POUCH 1018: Brand: STERI-DRAPE™

## (undated) DEVICE — CANNULA PERF L5.5IN DIA9FR AORT ROOT AG STD TIP W/ VENT LN

## (undated) DEVICE — GAUZE,SPONGE,8"X4",12PLY,XRAY,STRL,LF: Brand: MEDLINE

## (undated) DEVICE — CATHETER IV SHIELDED 1.77 INX16 GA 1.4X1.7 MM BLD CTRL GRY

## (undated) DEVICE — CANNULA PERF VEN 36/46 FRX15 IN TWO STG OVL BSKT MC2

## (undated) DEVICE — AGENT HEMSTAT 3GM OXIDIZED REGENERATED CELOS ABSRB FOR CONT (ORDER MULTIPLES OF 5EA)

## (undated) DEVICE — 6 FOOT DISPOSABLE EXTENSION CABLE WITH SAFE CONNECT / SCREW-DOWN

## (undated) DEVICE — CATHETER CV KT 9 FRX11.5 CM DL FOR 7.5-8 FR INTRO NDL MAC

## (undated) DEVICE — INTENDED FOR TISSUE SEPARATION, AND OTHER PROCEDURES THAT REQUIRE A SHARP SURGICAL BLADE TO PUNCTURE OR CUT.: Brand: BARD-PARKER ® STAINLESS STEEL BLADES

## (undated) DEVICE — ALCOHOL RUBBING ISO 16OZ 70%

## (undated) DEVICE — PINNACLE R/O II INTRODUCER SHEATH WITH RADIOPAQUE MARKER: Brand: PINNACLE

## (undated) DEVICE — SHEET,DRAPE,53X77,STERILE: Brand: MEDLINE

## (undated) DEVICE — MARKER SURG SKIN UTIL REGULAR/FINE 2 TIP W/ RUL AND 9 LBL

## (undated) DEVICE — SUTURE SZ 7 L18IN NONABSORBABLE SIL CCS L48MM 1/2 CIR STRNM M655G

## (undated) DEVICE — BLANKET WRM W35.4XL86.6IN FULL UNDERBODY + FORC AIR

## (undated) DEVICE — SENSOR OXMTR SM AD DISP FOR INVOS SYS

## (undated) DEVICE — CATHETER GUID EXTRA BACKUP 3.5 0.070IN 6FR 100CM VISTA BRITE TIP

## (undated) DEVICE — LEAD PACE L475MM CHNL A OR V MYOCARDIAL STEROID ELUT SIL

## (undated) DEVICE — SUTURE PROL SZ 4-0 L36IN NONABSORBABLE BLU L26MM SH 1/2 CIR 8521H

## (undated) DEVICE — PACK,BASIC,IX: Brand: MEDLINE

## (undated) DEVICE — CLIP SM RED INTERN HMOCLP TITAN LIGATING

## (undated) DEVICE — DISK-SHAPED STYLE, SILICONE (1 PER STERILE PKG): Brand: SCANLAN® RADIOMARK® GRAFT MARKERS

## (undated) DEVICE — SUTURE NRLN SZ 1 L18IN NONABSORBABLE BLK L36MM CT-1 1/2 CIR C520D

## (undated) DEVICE — HANDPIECE SET WITH COAXIAL MULTI-ORIFICE TIP AND SUCTION TUBE: Brand: INTERPULSE

## (undated) DEVICE — PENCIL ES CRD L10FT HND SWCHING ROCK SWCH W/ EDGE COAT BLDE

## (undated) DEVICE — MEDI-TRACE CADENCE ADULT, DEFIBRILLATION ELECTRODE -RTS  (10 PR/PK) - ZOLL: Brand: MEDI-TRACE CADENCE

## (undated) DEVICE — PACK,UNIVERSAL,NO GOWNS: Brand: MEDLINE

## (undated) DEVICE — SUTURE VICRYL SZ 4-0 L18IN ABSRB UD L19MM PS-2 3/8 CIR PRIM J496H

## (undated) DEVICE — CATHETER IV 22GA L1IN OD0.8382-0.9144MM ID0.6096-0.6858MM 382523

## (undated) DEVICE — STERILE LATEX POWDER FREE SURGICAL GLOVES WITH HYDROGEL COATING: Brand: PROTEXIS

## (undated) DEVICE — GUIDE CATHETER: Brand: RUNWAY™

## (undated) DEVICE — ANGIOGRAPHY KIT CUST MANIFOLD

## (undated) DEVICE — DRAIN,WOUND,ROUND,24FR,5/16",FULL-FLUTED: Brand: MEDLINE

## (undated) DEVICE — GAUZE,SPONGE,4"X4",8PLY,STRL,LF,10/TRAY: Brand: MEDLINE

## (undated) DEVICE — GOWN,SLEEVE,STERILE,W/CSR WRAP,1/P: Brand: MEDLINE

## (undated) DEVICE — RADIFOCUS GLIDEWIRE: Brand: GLIDEWIRE

## (undated) DEVICE — TOWEL,OR,DSP,ST,BLUE,STD,6/PK,12PK/CS: Brand: MEDLINE

## (undated) DEVICE — CONNECTOR DRNGE W3/8-0.5XH3/16XL3/16IN 2:1 SIL CARD STR

## (undated) DEVICE — DEVICE INFLATION KT 60031246] ANGIODYNAMICS INC]

## (undated) DEVICE — AGENT HEMOSTATIC SURGIFLOW MATRIX KIT W/THROMBIN

## (undated) DEVICE — CATHETER ANGIO 6FR L100CM DIA0.056IN FL4 CRV VASC ACCS EXPO

## (undated) DEVICE — AGENT HEMSTAT W6XL9IN OXIDIZED REGENERATED CELOS ABSRB FOR

## (undated) DEVICE — COUNTER NDL 30 COUNT FOAM STRP SGL MAG

## (undated) DEVICE — SUTURE PROL SZ 6-0 L30IN NONABSORBABLE BLU L13MM RB-2 1/2 8711H

## (undated) DEVICE — Device

## (undated) DEVICE — SYRINGE MED 30ML STD CLR PLAS LUERLOCK TIP N CTRL DISP

## (undated) DEVICE — DRAIN SURG L3/8-1/2IN DIA3/16IN SIL CARD CONN 1:1 BLAK

## (undated) DEVICE — CONTAINER,SPECIMEN,OR STERILE,4OZ: Brand: MEDLINE

## (undated) DEVICE — FAN SPRAY KIT: Brand: PULSAVAC®

## (undated) DEVICE — PREVENA INCISION MANAGEMENT SYSTEM- PEEL & PLACE DRESSING: Brand: PREVENA™ PEEL & PLACE™

## (undated) DEVICE — SUTURE PERMA-HAND 1 L30IN NONABSORBABLE BLK SILK BRAID W/O SA87G

## (undated) DEVICE — GOWN,ECLIPSE,POLYRNF,BRTHSLV,L,30/CS: Brand: MEDLINE

## (undated) DEVICE — FOGARTY SPRING CLIPS 6MM: Brand: FOGARTY SOFTJAW

## (undated) DEVICE — CANNULA 96600 117 BIO MEDICUS 17FR EA

## (undated) DEVICE — 6FR XB3 CORDIS GUIDE 100CM

## (undated) DEVICE — GLOVE SURG SZ 7.5 L11.73IN FNGR THK9.8MIL STRW LTX POLYMER

## (undated) DEVICE — 3M™ IOBAN™ 2 ANTIMICROBIAL INCISE DRAPE 6651EZ: Brand: IOBAN™ 2

## (undated) DEVICE — DEVICE COMPR LNG 27 CM VASC BND

## (undated) DEVICE — SET ADMIN L105IN 10GTT 3 NDL FREE CK VLV 2 PC M LUERLOCK

## (undated) DEVICE — KIT COMPL CK0289R4  BB9L99R8

## (undated) DEVICE — YANKAUER,BULB TIP,W/O VENT,RIGID,STERILE: Brand: MEDLINE

## (undated) DEVICE — BLADE,STAINLESS-STEEL,10,STRL,DISPOSABLE: Brand: MEDLINE

## (undated) DEVICE — BLANKET WRM W40.2XL55.9IN IORT LO BODY + MISTRAL AIR

## (undated) DEVICE — DECANTER BAG 9": Brand: MEDLINE INDUSTRIES, INC.

## (undated) DEVICE — DRAIN SURG SGL COLL PT TB FOR ATS BG OASIS

## (undated) DEVICE — CATHETER ANGIO 6FR L100CM DIA0.056IN FR4 CRV VASC ACCS EXPO

## (undated) DEVICE — OPEN HEART A: Brand: MEDLINE INDUSTRIES, INC.

## (undated) DEVICE — SET ADMIN PRIMING 67ML L105IN NVENT 180UM FLTR 3 RLER CLMP

## (undated) DEVICE — 4-PORT MANIFOLD: Brand: NEPTUNE 2

## (undated) DEVICE — SYRINGE IRRIG 60ML SFT PLIABLE BLB EZ TO GRP 1 HND USE W/

## (undated) DEVICE — DEVICE RESUS AD TB L40IN SELF INFL MASK TEXT BG DBL SWVL EL

## (undated) DEVICE — RUNTHROUGH NS EXTRA FLOPPY PTCA GUIDEWIRE: Brand: RUNTHROUGH

## (undated) DEVICE — SUTURE ETHIBOND SZ 2-0 L30IN NONABSORBABLE GRN V-7 L26MM 1/2 X977H

## (undated) DEVICE — OPEN HEART B: Brand: MEDLINE INDUSTRIES, INC.

## (undated) DEVICE — Z DSICONTINUED USE 2881387 SUTURE PROL SZ 5-0 L36IN NONABSORBABLE BLU L13MM C-1 3/8 8720H

## (undated) DEVICE — SUTURE VICRYL SZ 1 L36IN ABSRB UD CTX L48MM 1/2 CIR J977H

## (undated) DEVICE — Z DUPLICATE USE 2891725 CLIP LIG M BLU TI HRT SHP WIRE HORZ 600 PER BX

## (undated) DEVICE — GLIDESHEATH SLENDER ACCESS KIT: Brand: GLIDESHEATH SLENDER

## (undated) DEVICE — WIRE GUID DIAG PTFE COAT FIX COR STD XIBLE J TIP 7MM

## (undated) DEVICE — TUBING, SUCTION, 9/32" X 10', STRAIGHT: Brand: MEDLINE

## (undated) DEVICE — 1LYRTR 16FR10ML100%SILTMPS SNP: Brand: MEDLINE INDUSTRIES, INC.

## (undated) DEVICE — SUTURE MONOCRYL SZ 3-0 L27IN ABSRB UD L24MM PS-1 3/8 CIR PRIM Y936H

## (undated) DEVICE — SUTURE NONABSORBABLE MONOFILAMENT 7-0 BV-1 1X24 IN PROLENE 8702H

## (undated) DEVICE — Device: Brand: VIRTUOSAPH PLUS WITH RADIAL INDICATION

## (undated) DEVICE — RADIFOCUS OPTITORQUE ANGIOGRAPHIC CATHETER: Brand: OPTITORQUE

## (undated) DEVICE — ADAPTER Y TYP COR PERF FEM LUER W WHT CLMP

## (undated) DEVICE — SUTURE PROL SZ 3-0 L36IN NONABSORBABLE BLU L26MM SH 1/2 CIR 8522H

## (undated) DEVICE — BLADE OPHTH D5MM 15DEG GRN W/ RND KNURLED HNDL MICRO-SHARP

## (undated) DEVICE — SUTURE VICRYL SZ 3-0 L36IN ABSRB UD L36MM CT-1 1/2 CIR J944H

## (undated) DEVICE — BAG TRNSF AUTOLGS SUCT AND ANTICOAG LN AUTOLOG

## (undated) DEVICE — PLEDGET VASC W3/16XL3/8IN THK1/16IN PTFE SFT

## (undated) DEVICE — DRAPE,UTILITY,XL,4/PK,STERILE: Brand: MEDLINE

## (undated) DEVICE — SUTURE VICRYL 2-0 L36IN ABSRB UD CTX L48MM 1/2 CIR TAPERPOINT J979H

## (undated) DEVICE — RESUSCITATOR,MANUAL,ADLT,MASK,TUBE RES: Brand: MEDLINE INDUSTRIES, INC.